# Patient Record
Sex: MALE | Race: WHITE | NOT HISPANIC OR LATINO | Employment: OTHER | URBAN - NONMETROPOLITAN AREA
[De-identification: names, ages, dates, MRNs, and addresses within clinical notes are randomized per-mention and may not be internally consistent; named-entity substitution may affect disease eponyms.]

---

## 2024-05-28 ENCOUNTER — HOSPITAL ENCOUNTER (EMERGENCY)
Facility: HOSPITAL | Age: 39
Discharge: HOME | End: 2024-05-28
Payer: MEDICAID

## 2024-05-28 ENCOUNTER — APPOINTMENT (OUTPATIENT)
Dept: RADIOLOGY | Facility: HOSPITAL | Age: 39
End: 2024-05-28
Payer: MEDICAID

## 2024-05-28 VITALS
HEART RATE: 95 BPM | SYSTOLIC BLOOD PRESSURE: 102 MMHG | HEIGHT: 76 IN | RESPIRATION RATE: 18 BRPM | BODY MASS INDEX: 26.79 KG/M2 | TEMPERATURE: 98.2 F | WEIGHT: 220 LBS | DIASTOLIC BLOOD PRESSURE: 69 MMHG | OXYGEN SATURATION: 97 %

## 2024-05-28 DIAGNOSIS — M79.605 LEG PAIN, ANTERIOR, LEFT: Primary | ICD-10-CM

## 2024-05-28 PROCEDURE — 73590 X-RAY EXAM OF LOWER LEG: CPT | Mod: LEFT SIDE | Performed by: RADIOLOGY

## 2024-05-28 PROCEDURE — 73610 X-RAY EXAM OF ANKLE: CPT | Mod: LT

## 2024-05-28 PROCEDURE — 73590 X-RAY EXAM OF LOWER LEG: CPT | Mod: LT

## 2024-05-28 PROCEDURE — 2500000004 HC RX 250 GENERAL PHARMACY W/ HCPCS (ALT 636 FOR OP/ED): Mod: SE

## 2024-05-28 PROCEDURE — 93971 EXTREMITY STUDY: CPT | Performed by: RADIOLOGY

## 2024-05-28 PROCEDURE — 99284 EMERGENCY DEPT VISIT MOD MDM: CPT | Mod: 25

## 2024-05-28 PROCEDURE — 93971 EXTREMITY STUDY: CPT

## 2024-05-28 PROCEDURE — 96372 THER/PROPH/DIAG INJ SC/IM: CPT

## 2024-05-28 PROCEDURE — 73610 X-RAY EXAM OF ANKLE: CPT | Mod: LEFT SIDE | Performed by: RADIOLOGY

## 2024-05-28 RX ORDER — MORPHINE SULFATE 4 MG/ML
4 INJECTION, SOLUTION INTRAMUSCULAR; INTRAVENOUS ONCE
Status: COMPLETED | OUTPATIENT
Start: 2024-05-28 | End: 2024-05-28

## 2024-05-28 RX ORDER — OXYCODONE AND ACETAMINOPHEN 5; 325 MG/1; MG/1
1 TABLET ORAL EVERY 6 HOURS PRN
Qty: 5 TABLET | Refills: 0 | Status: SHIPPED | OUTPATIENT
Start: 2024-05-28 | End: 2024-05-31

## 2024-05-28 RX ORDER — KETOROLAC TROMETHAMINE 30 MG/ML
30 INJECTION, SOLUTION INTRAMUSCULAR; INTRAVENOUS ONCE
Status: COMPLETED | OUTPATIENT
Start: 2024-05-28 | End: 2024-05-28

## 2024-05-28 RX ADMIN — KETOROLAC TROMETHAMINE 30 MG: 30 INJECTION, SOLUTION INTRAMUSCULAR at 13:53

## 2024-05-28 RX ADMIN — MORPHINE SULFATE 4 MG: 4 INJECTION, SOLUTION INTRAMUSCULAR; INTRAVENOUS at 14:24

## 2024-05-28 ASSESSMENT — COLUMBIA-SUICIDE SEVERITY RATING SCALE - C-SSRS
1. IN THE PAST MONTH, HAVE YOU WISHED YOU WERE DEAD OR WISHED YOU COULD GO TO SLEEP AND NOT WAKE UP?: NO
2. HAVE YOU ACTUALLY HAD ANY THOUGHTS OF KILLING YOURSELF?: NO
6. HAVE YOU EVER DONE ANYTHING, STARTED TO DO ANYTHING, OR PREPARED TO DO ANYTHING TO END YOUR LIFE?: NO

## 2024-05-28 ASSESSMENT — PAIN DESCRIPTION - ORIENTATION: ORIENTATION: LEFT

## 2024-05-28 ASSESSMENT — PAIN - FUNCTIONAL ASSESSMENT: PAIN_FUNCTIONAL_ASSESSMENT: 0-10

## 2024-05-28 ASSESSMENT — PAIN DESCRIPTION - LOCATION: LOCATION: LEG

## 2024-05-28 ASSESSMENT — PAIN SCALES - GENERAL
PAINLEVEL_OUTOF10: 3
PAINLEVEL_OUTOF10: 8
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
## 2024-06-05 ENCOUNTER — OFFICE VISIT (OUTPATIENT)
Dept: ORTHOPEDIC SURGERY | Facility: CLINIC | Age: 39
End: 2024-06-05
Payer: MEDICAID

## 2024-06-05 DIAGNOSIS — M25.562 LEFT KNEE PAIN, UNSPECIFIED CHRONICITY: ICD-10-CM

## 2024-06-05 DIAGNOSIS — S82.892S CLOSED LEFT ANKLE FRACTURE, SEQUELA: ICD-10-CM

## 2024-06-05 DIAGNOSIS — Z96.7 FIXATION HARDWARE IN LEG: ICD-10-CM

## 2024-06-05 PROCEDURE — 99214 OFFICE O/P EST MOD 30 MIN: CPT | Performed by: ORTHOPAEDIC SURGERY

## 2024-06-05 PROCEDURE — 99204 OFFICE O/P NEW MOD 45 MIN: CPT | Performed by: ORTHOPAEDIC SURGERY

## 2024-06-05 ASSESSMENT — PAIN - FUNCTIONAL ASSESSMENT: PAIN_FUNCTIONAL_ASSESSMENT: 0-10

## 2024-06-05 ASSESSMENT — PAIN SCALES - GENERAL: PAINLEVEL_OUTOF10: 5 - MODERATE PAIN
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
## 2024-06-14 ENCOUNTER — HOSPITAL ENCOUNTER (OUTPATIENT)
Dept: RADIOLOGY | Facility: HOSPITAL | Age: 39
Discharge: HOME | End: 2024-06-14
Payer: MEDICAID

## 2024-06-14 ENCOUNTER — OFFICE VISIT (OUTPATIENT)
Dept: ORTHOPEDIC SURGERY | Facility: HOSPITAL | Age: 39
End: 2024-06-14
Payer: MEDICAID

## 2024-06-14 VITALS — WEIGHT: 220 LBS | BODY MASS INDEX: 26.79 KG/M2 | HEIGHT: 76 IN

## 2024-06-14 DIAGNOSIS — S82.892S CLOSED LEFT ANKLE FRACTURE, SEQUELA: ICD-10-CM

## 2024-06-14 DIAGNOSIS — S82.892S CLOSED LEFT ANKLE FRACTURE, SEQUELA: Primary | ICD-10-CM

## 2024-06-14 DIAGNOSIS — M86.662: ICD-10-CM

## 2024-06-14 DIAGNOSIS — Z96.7 FIXATION HARDWARE IN LEG: ICD-10-CM

## 2024-06-14 PROCEDURE — 73590 X-RAY EXAM OF LOWER LEG: CPT | Mod: LT

## 2024-06-14 PROCEDURE — 73610 X-RAY EXAM OF ANKLE: CPT | Mod: LT

## 2024-06-14 PROCEDURE — 99215 OFFICE O/P EST HI 40 MIN: CPT | Performed by: ORTHOPAEDIC SURGERY

## 2024-06-14 RX ORDER — CEFAZOLIN SODIUM 2 G/100ML
2 INJECTION, SOLUTION INTRAVENOUS ONCE
OUTPATIENT
Start: 2024-06-14 | End: 2024-06-14

## 2024-06-14 RX ORDER — SODIUM CHLORIDE 9 MG/ML
100 INJECTION, SOLUTION INTRAVENOUS CONTINUOUS
OUTPATIENT
Start: 2024-06-14

## 2024-06-14 ASSESSMENT — PAIN SCALES - GENERAL: PAINLEVEL_OUTOF10: 4

## 2024-06-14 ASSESSMENT — PAIN - FUNCTIONAL ASSESSMENT: PAIN_FUNCTIONAL_ASSESSMENT: 0-10

## 2024-06-14 ASSESSMENT — PAIN DESCRIPTION - DESCRIPTORS: DESCRIPTORS: ACHING;THROBBING;SHARP
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
## 2024-06-18 ENCOUNTER — APPOINTMENT (OUTPATIENT)
Dept: RADIOLOGY | Facility: HOSPITAL | Age: 39
End: 2024-06-18
Payer: MEDICAID

## 2024-06-19 ENCOUNTER — APPOINTMENT (OUTPATIENT)
Dept: RADIOLOGY | Facility: HOSPITAL | Age: 39
End: 2024-06-19
Payer: MEDICAID

## 2024-06-20 ENCOUNTER — LAB (OUTPATIENT)
Dept: LAB | Facility: LAB | Age: 39
End: 2024-06-20
Payer: MEDICAID

## 2024-06-20 ENCOUNTER — HOSPITAL ENCOUNTER (OUTPATIENT)
Dept: RADIOLOGY | Facility: HOSPITAL | Age: 39
Discharge: HOME | End: 2024-06-20
Payer: MEDICAID

## 2024-06-20 DIAGNOSIS — Z96.7 FIXATION HARDWARE IN LEG: ICD-10-CM

## 2024-06-20 DIAGNOSIS — M86.662: ICD-10-CM

## 2024-06-20 DIAGNOSIS — S82.892S CLOSED LEFT ANKLE FRACTURE, SEQUELA: ICD-10-CM

## 2024-06-20 LAB
25(OH)D3 SERPL-MCNC: 23 NG/ML (ref 30–100)
ALBUMIN SERPL BCP-MCNC: 4.3 G/DL (ref 3.4–5)
ALP SERPL-CCNC: 62 U/L (ref 33–120)
ALT SERPL W P-5'-P-CCNC: 17 U/L (ref 10–52)
ANION GAP SERPL CALC-SCNC: 12 MMOL/L (ref 10–20)
AST SERPL W P-5'-P-CCNC: 12 U/L (ref 9–39)
BASOPHILS # BLD AUTO: 0.07 X10*3/UL (ref 0–0.1)
BASOPHILS NFR BLD AUTO: 0.6 %
BILIRUB SERPL-MCNC: 0.3 MG/DL (ref 0–1.2)
BUN SERPL-MCNC: 26 MG/DL (ref 6–23)
CALCIUM SERPL-MCNC: 9.6 MG/DL (ref 8.6–10.3)
CHLORIDE SERPL-SCNC: 104 MMOL/L (ref 98–107)
CO2 SERPL-SCNC: 25 MMOL/L (ref 21–32)
CREAT SERPL-MCNC: 1.15 MG/DL (ref 0.5–1.3)
CRP SERPL-MCNC: 0.7 MG/DL
EGFRCR SERPLBLD CKD-EPI 2021: 84 ML/MIN/1.73M*2
EOSINOPHIL # BLD AUTO: 0.24 X10*3/UL (ref 0–0.7)
EOSINOPHIL NFR BLD AUTO: 2.2 %
ERYTHROCYTE [DISTWIDTH] IN BLOOD BY AUTOMATED COUNT: 15.9 % (ref 11.5–14.5)
ERYTHROCYTE [SEDIMENTATION RATE] IN BLOOD BY WESTERGREN METHOD: 20 MM/H (ref 0–15)
GLUCOSE SERPL-MCNC: 97 MG/DL (ref 74–99)
HCT VFR BLD AUTO: 48.6 % (ref 41–52)
HGB BLD-MCNC: 15.9 G/DL (ref 13.5–17.5)
IMM GRANULOCYTES # BLD AUTO: 0.11 X10*3/UL (ref 0–0.7)
IMM GRANULOCYTES NFR BLD AUTO: 1 % (ref 0–0.9)
LYMPHOCYTES # BLD AUTO: 2.34 X10*3/UL (ref 1.2–4.8)
LYMPHOCYTES NFR BLD AUTO: 21.1 %
MCH RBC QN AUTO: 26.6 PG (ref 26–34)
MCHC RBC AUTO-ENTMCNC: 32.7 G/DL (ref 32–36)
MCV RBC AUTO: 81 FL (ref 80–100)
MONOCYTES # BLD AUTO: 1 X10*3/UL (ref 0.1–1)
MONOCYTES NFR BLD AUTO: 9 %
NEUTROPHILS # BLD AUTO: 7.31 X10*3/UL (ref 1.2–7.7)
NEUTROPHILS NFR BLD AUTO: 66.1 %
NRBC BLD-RTO: 0 /100 WBCS (ref 0–0)
PLATELET # BLD AUTO: 311 X10*3/UL (ref 150–450)
POTASSIUM SERPL-SCNC: 3.9 MMOL/L (ref 3.5–5.3)
PROT SERPL-MCNC: 7.6 G/DL (ref 6.4–8.2)
PTH-INTACT SERPL-MCNC: 37.8 PG/ML (ref 18.5–88)
RBC # BLD AUTO: 5.97 X10*6/UL (ref 4.5–5.9)
SODIUM SERPL-SCNC: 137 MMOL/L (ref 136–145)
TSH SERPL-ACNC: 2.49 MIU/L (ref 0.44–3.98)
WBC # BLD AUTO: 11.1 X10*3/UL (ref 4.4–11.3)

## 2024-06-20 PROCEDURE — 86140 C-REACTIVE PROTEIN: CPT

## 2024-06-20 PROCEDURE — 85652 RBC SED RATE AUTOMATED: CPT

## 2024-06-20 PROCEDURE — 82530 CORTISOL FREE: CPT

## 2024-06-20 PROCEDURE — 84443 ASSAY THYROID STIM HORMONE: CPT

## 2024-06-20 PROCEDURE — 82306 VITAMIN D 25 HYDROXY: CPT

## 2024-06-20 PROCEDURE — 73700 CT LOWER EXTREMITY W/O DYE: CPT | Mod: LT

## 2024-06-20 PROCEDURE — 80053 COMPREHEN METABOLIC PANEL: CPT

## 2024-06-20 PROCEDURE — 85025 COMPLETE CBC W/AUTO DIFF WBC: CPT

## 2024-06-20 PROCEDURE — 83970 ASSAY OF PARATHORMONE: CPT

## 2024-06-24 ENCOUNTER — ANESTHESIA EVENT (OUTPATIENT)
Dept: OPERATING ROOM | Facility: HOSPITAL | Age: 39
End: 2024-06-24
Payer: MEDICAID

## 2024-06-25 ENCOUNTER — HOSPITAL ENCOUNTER (INPATIENT)
Facility: HOSPITAL | Age: 39
LOS: 2 days | Discharge: HOME | End: 2024-06-29
Attending: ORTHOPAEDIC SURGERY | Admitting: ORTHOPAEDIC SURGERY
Payer: MEDICAID

## 2024-06-25 ENCOUNTER — APPOINTMENT (OUTPATIENT)
Dept: RADIOLOGY | Facility: HOSPITAL | Age: 39
End: 2024-06-25
Payer: MEDICAID

## 2024-06-25 ENCOUNTER — ANESTHESIA (OUTPATIENT)
Dept: OPERATING ROOM | Facility: HOSPITAL | Age: 39
End: 2024-06-25
Payer: MEDICAID

## 2024-06-25 DIAGNOSIS — S82.892S CLOSED LEFT ANKLE FRACTURE, SEQUELA: Primary | ICD-10-CM

## 2024-06-25 DIAGNOSIS — T84.623S: ICD-10-CM

## 2024-06-25 DIAGNOSIS — M86.662: ICD-10-CM

## 2024-06-25 LAB
ABO GROUP (TYPE) IN BLOOD: NORMAL
ANTIBODY SCREEN: NORMAL
CORTIS F SERPL-MCNC: 0.72 UG/DL
RH FACTOR (ANTIGEN D): NORMAL

## 2024-06-25 PROCEDURE — A4217 STERILE WATER/SALINE, 500 ML: HCPCS | Performed by: ORTHOPAEDIC SURGERY

## 2024-06-25 PROCEDURE — 99222 1ST HOSP IP/OBS MODERATE 55: CPT | Performed by: INTERNAL MEDICINE

## 2024-06-25 PROCEDURE — 3600000009 HC OR TIME - EACH INCREMENTAL 1 MINUTE - PROCEDURE LEVEL FOUR: Performed by: ORTHOPAEDIC SURGERY

## 2024-06-25 PROCEDURE — 3600000004 HC OR TIME - INITIAL BASE CHARGE - PROCEDURE LEVEL FOUR: Performed by: ORTHOPAEDIC SURGERY

## 2024-06-25 PROCEDURE — 99223 1ST HOSP IP/OBS HIGH 75: CPT | Performed by: STUDENT IN AN ORGANIZED HEALTH CARE EDUCATION/TRAINING PROGRAM

## 2024-06-25 PROCEDURE — 7100000001 HC RECOVERY ROOM TIME - INITIAL BASE CHARGE: Performed by: ORTHOPAEDIC SURGERY

## 2024-06-25 PROCEDURE — 36415 COLL VENOUS BLD VENIPUNCTURE: CPT | Performed by: ORTHOPAEDIC SURGERY

## 2024-06-25 PROCEDURE — 3700000001 HC GENERAL ANESTHESIA TIME - INITIAL BASE CHARGE: Performed by: ORTHOPAEDIC SURGERY

## 2024-06-25 PROCEDURE — 0QPH04Z REMOVAL OF INTERNAL FIXATION DEVICE FROM LEFT TIBIA, OPEN APPROACH: ICD-10-PCS | Performed by: OBSTETRICS & GYNECOLOGY

## 2024-06-25 PROCEDURE — 2500000004 HC RX 250 GENERAL PHARMACY W/ HCPCS (ALT 636 FOR OP/ED): Mod: SE | Performed by: STUDENT IN AN ORGANIZED HEALTH CARE EDUCATION/TRAINING PROGRAM

## 2024-06-25 PROCEDURE — 2500000001 HC RX 250 WO HCPCS SELF ADMINISTERED DRUGS (ALT 637 FOR MEDICARE OP): Mod: SE | Performed by: STUDENT IN AN ORGANIZED HEALTH CARE EDUCATION/TRAINING PROGRAM

## 2024-06-25 PROCEDURE — 2500000004 HC RX 250 GENERAL PHARMACY W/ HCPCS (ALT 636 FOR OP/ED): Mod: SE

## 2024-06-25 PROCEDURE — 3700000002 HC GENERAL ANESTHESIA TIME - EACH INCREMENTAL 1 MINUTE: Performed by: ORTHOPAEDIC SURGERY

## 2024-06-25 PROCEDURE — 27640 PARTIAL REMOVAL OF TIBIA: CPT | Performed by: ORTHOPAEDIC SURGERY

## 2024-06-25 PROCEDURE — 87070 CULTURE OTHR SPECIMN AEROBIC: CPT | Performed by: ORTHOPAEDIC SURGERY

## 2024-06-25 PROCEDURE — 0QBH0ZZ EXCISION OF LEFT TIBIA, OPEN APPROACH: ICD-10-PCS | Performed by: OBSTETRICS & GYNECOLOGY

## 2024-06-25 PROCEDURE — 2500000005 HC RX 250 GENERAL PHARMACY W/O HCPCS

## 2024-06-25 PROCEDURE — 7100000002 HC RECOVERY ROOM TIME - EACH INCREMENTAL 1 MINUTE: Performed by: ORTHOPAEDIC SURGERY

## 2024-06-25 PROCEDURE — C1763 CONN TISS, NON-HUMAN: HCPCS | Performed by: ORTHOPAEDIC SURGERY

## 2024-06-25 PROCEDURE — 2780000003 HC OR 278 NO HCPCS: Performed by: ORTHOPAEDIC SURGERY

## 2024-06-25 PROCEDURE — 87102 FUNGUS ISOLATION CULTURE: CPT | Performed by: ORTHOPAEDIC SURGERY

## 2024-06-25 PROCEDURE — 2720000007 HC OR 272 NO HCPCS: Performed by: ORTHOPAEDIC SURGERY

## 2024-06-25 PROCEDURE — 20680 REMOVAL OF IMPLANT DEEP: CPT | Performed by: PHYSICIAN ASSISTANT

## 2024-06-25 PROCEDURE — C1713 ANCHOR/SCREW BN/BN,TIS/BN: HCPCS | Performed by: ORTHOPAEDIC SURGERY

## 2024-06-25 PROCEDURE — 76942 ECHO GUIDE FOR BIOPSY: CPT | Performed by: STUDENT IN AN ORGANIZED HEALTH CARE EDUCATION/TRAINING PROGRAM

## 2024-06-25 PROCEDURE — 13121 CMPLX RPR S/A/L 2.6-7.5 CM: CPT | Performed by: ORTHOPAEDIC SURGERY

## 2024-06-25 PROCEDURE — 20702 MNL PREP&INSJ IMED RX DEV: CPT | Performed by: ORTHOPAEDIC SURGERY

## 2024-06-25 PROCEDURE — XW0V0P7 INTRODUCTION OF ANTIBIOTIC-ELUTING BONE VOID FILLER INTO BONES, OPEN APPROACH, NEW TECHNOLOGY GROUP 7: ICD-10-PCS | Performed by: OBSTETRICS & GYNECOLOGY

## 2024-06-25 PROCEDURE — 1100000001 HC PRIVATE ROOM DAILY

## 2024-06-25 PROCEDURE — 86901 BLOOD TYPING SEROLOGIC RH(D): CPT | Performed by: ORTHOPAEDIC SURGERY

## 2024-06-25 PROCEDURE — 20680 REMOVAL OF IMPLANT DEEP: CPT | Performed by: ORTHOPAEDIC SURGERY

## 2024-06-25 PROCEDURE — 7100000011 HC EXTENDED STAY RECOVERY HOURLY - NURSING UNIT

## 2024-06-25 PROCEDURE — 2500000004 HC RX 250 GENERAL PHARMACY W/ HCPCS (ALT 636 FOR OP/ED): Performed by: ORTHOPAEDIC SURGERY

## 2024-06-25 PROCEDURE — 2500000004 HC RX 250 GENERAL PHARMACY W/ HCPCS (ALT 636 FOR OP/ED)

## 2024-06-25 PROCEDURE — 2500000001 HC RX 250 WO HCPCS SELF ADMINISTERED DRUGS (ALT 637 FOR MEDICARE OP): Mod: SE

## 2024-06-25 DEVICE — STIMULAN KIT, RAPID CURE, 10CC: Type: IMPLANTABLE DEVICE | Site: LEG | Status: FUNCTIONAL

## 2024-06-25 DEVICE — IMPLANTABLE DEVICE: Type: IMPLANTABLE DEVICE | Site: LEG | Status: NON-FUNCTIONAL

## 2024-06-25 RX ORDER — TOBRAMYCIN 1.2 G/30ML
INJECTION, POWDER, LYOPHILIZED, FOR SOLUTION INTRAVENOUS AS NEEDED
Status: DISCONTINUED | OUTPATIENT
Start: 2024-06-25 | End: 2024-06-25 | Stop reason: HOSPADM

## 2024-06-25 RX ORDER — HYDROMORPHONE HYDROCHLORIDE 1 MG/ML
0.5 INJECTION, SOLUTION INTRAMUSCULAR; INTRAVENOUS; SUBCUTANEOUS EVERY 5 MIN PRN
Status: DISCONTINUED | OUTPATIENT
Start: 2024-06-25 | End: 2024-06-25 | Stop reason: HOSPADM

## 2024-06-25 RX ORDER — VANCOMYCIN HYDROCHLORIDE 1 G/200ML
1000 INJECTION, SOLUTION INTRAVENOUS EVERY 12 HOURS
Status: DISCONTINUED | OUTPATIENT
Start: 2024-06-25 | End: 2024-06-26

## 2024-06-25 RX ORDER — TRANEXAMIC ACID 100 MG/ML
INJECTION, SOLUTION INTRAVENOUS AS NEEDED
Status: DISCONTINUED | OUTPATIENT
Start: 2024-06-25 | End: 2024-06-25

## 2024-06-25 RX ORDER — GABAPENTIN 100 MG/1
100 CAPSULE ORAL EVERY 8 HOURS SCHEDULED
Status: DISCONTINUED | OUTPATIENT
Start: 2024-06-25 | End: 2024-06-29 | Stop reason: HOSPADM

## 2024-06-25 RX ORDER — KETOROLAC TROMETHAMINE 30 MG/ML
15 INJECTION, SOLUTION INTRAMUSCULAR; INTRAVENOUS ONCE
Status: COMPLETED | OUTPATIENT
Start: 2024-06-25 | End: 2024-06-25

## 2024-06-25 RX ORDER — ACETAMINOPHEN 325 MG/1
650 TABLET ORAL EVERY 6 HOURS SCHEDULED
Status: DISCONTINUED | OUTPATIENT
Start: 2024-06-25 | End: 2024-06-29 | Stop reason: HOSPADM

## 2024-06-25 RX ORDER — HYDROMORPHONE HYDROCHLORIDE 1 MG/ML
INJECTION, SOLUTION INTRAMUSCULAR; INTRAVENOUS; SUBCUTANEOUS AS NEEDED
Status: DISCONTINUED | OUTPATIENT
Start: 2024-06-25 | End: 2024-06-25

## 2024-06-25 RX ORDER — HYDROMORPHONE HYDROCHLORIDE 1 MG/ML
0.2 INJECTION, SOLUTION INTRAMUSCULAR; INTRAVENOUS; SUBCUTANEOUS EVERY 5 MIN PRN
Status: DISCONTINUED | OUTPATIENT
Start: 2024-06-25 | End: 2024-06-25 | Stop reason: HOSPADM

## 2024-06-25 RX ORDER — MIDAZOLAM HYDROCHLORIDE 1 MG/ML
INJECTION INTRAMUSCULAR; INTRAVENOUS AS NEEDED
Status: DISCONTINUED | OUTPATIENT
Start: 2024-06-25 | End: 2024-06-25

## 2024-06-25 RX ORDER — METHOCARBAMOL 100 MG/ML
1000 INJECTION, SOLUTION INTRAMUSCULAR; INTRAVENOUS ONCE
Status: COMPLETED | OUTPATIENT
Start: 2024-06-25 | End: 2024-06-25

## 2024-06-25 RX ORDER — LIDOCAINE HYDROCHLORIDE 10 MG/ML
0.1 INJECTION INFILTRATION; PERINEURAL ONCE
Status: DISCONTINUED | OUTPATIENT
Start: 2024-06-25 | End: 2024-06-25 | Stop reason: HOSPADM

## 2024-06-25 RX ORDER — PROPOFOL 10 MG/ML
INJECTION, EMULSION INTRAVENOUS AS NEEDED
Status: DISCONTINUED | OUTPATIENT
Start: 2024-06-25 | End: 2024-06-25

## 2024-06-25 RX ORDER — OXYCODONE HYDROCHLORIDE 5 MG/1
5 TABLET ORAL EVERY 4 HOURS PRN
Status: DISCONTINUED | OUTPATIENT
Start: 2024-06-25 | End: 2024-06-25

## 2024-06-25 RX ORDER — ONDANSETRON HYDROCHLORIDE 2 MG/ML
4 INJECTION, SOLUTION INTRAVENOUS EVERY 8 HOURS PRN
Status: DISCONTINUED | OUTPATIENT
Start: 2024-06-25 | End: 2024-06-29 | Stop reason: HOSPADM

## 2024-06-25 RX ORDER — MORPHINE SULFATE 2 MG/ML
2 INJECTION, SOLUTION INTRAMUSCULAR; INTRAVENOUS EVERY 4 HOURS PRN
Status: DISCONTINUED | OUTPATIENT
Start: 2024-06-25 | End: 2024-06-29 | Stop reason: HOSPADM

## 2024-06-25 RX ORDER — LIDOCAINE HCL/PF 100 MG/5ML
SYRINGE (ML) INTRAVENOUS AS NEEDED
Status: DISCONTINUED | OUTPATIENT
Start: 2024-06-25 | End: 2024-06-25

## 2024-06-25 RX ORDER — METHOCARBAMOL 500 MG/1
750 TABLET, FILM COATED ORAL EVERY 8 HOURS SCHEDULED
Status: DISCONTINUED | OUTPATIENT
Start: 2024-06-25 | End: 2024-06-29 | Stop reason: HOSPADM

## 2024-06-25 RX ORDER — OXYCODONE HYDROCHLORIDE 5 MG/1
5 TABLET ORAL EVERY 4 HOURS PRN
Status: DISCONTINUED | OUTPATIENT
Start: 2024-06-25 | End: 2024-06-26

## 2024-06-25 RX ORDER — OXYCODONE HYDROCHLORIDE 5 MG/1
10 TABLET ORAL EVERY 6 HOURS PRN
Status: DISCONTINUED | OUTPATIENT
Start: 2024-06-25 | End: 2024-06-26

## 2024-06-25 RX ORDER — DIPHENHYDRAMINE HCL 12.5MG/5ML
12.5 LIQUID (ML) ORAL EVERY 6 HOURS PRN
Status: DISCONTINUED | OUTPATIENT
Start: 2024-06-25 | End: 2024-06-29 | Stop reason: HOSPADM

## 2024-06-25 RX ORDER — SODIUM CHLORIDE 0.9 G/100ML
IRRIGANT IRRIGATION AS NEEDED
Status: DISCONTINUED | OUTPATIENT
Start: 2024-06-25 | End: 2024-06-25 | Stop reason: HOSPADM

## 2024-06-25 RX ORDER — OXYCODONE HYDROCHLORIDE 5 MG/1
10 TABLET ORAL EVERY 4 HOURS PRN
Status: DISCONTINUED | OUTPATIENT
Start: 2024-06-25 | End: 2024-06-25

## 2024-06-25 RX ORDER — FENTANYL CITRATE 50 UG/ML
INJECTION, SOLUTION INTRAMUSCULAR; INTRAVENOUS AS NEEDED
Status: DISCONTINUED | OUTPATIENT
Start: 2024-06-25 | End: 2024-06-25

## 2024-06-25 RX ORDER — MORPHINE SULFATE 15 MG/1
7.5 TABLET ORAL EVERY 4 HOURS PRN
Status: DISCONTINUED | OUTPATIENT
Start: 2024-06-25 | End: 2024-06-25

## 2024-06-25 RX ORDER — SODIUM CHLORIDE 9 MG/ML
100 INJECTION, SOLUTION INTRAVENOUS CONTINUOUS
Status: DISCONTINUED | OUTPATIENT
Start: 2024-06-25 | End: 2024-06-25

## 2024-06-25 RX ORDER — ONDANSETRON HYDROCHLORIDE 2 MG/ML
4 INJECTION, SOLUTION INTRAVENOUS ONCE AS NEEDED
Status: DISCONTINUED | OUTPATIENT
Start: 2024-06-25 | End: 2024-06-25 | Stop reason: HOSPADM

## 2024-06-25 RX ORDER — POLYETHYLENE GLYCOL 3350 17 G/17G
17 POWDER, FOR SOLUTION ORAL DAILY
Status: DISCONTINUED | OUTPATIENT
Start: 2024-06-25 | End: 2024-06-29 | Stop reason: HOSPADM

## 2024-06-25 RX ORDER — ONDANSETRON HYDROCHLORIDE 2 MG/ML
INJECTION, SOLUTION INTRAVENOUS AS NEEDED
Status: DISCONTINUED | OUTPATIENT
Start: 2024-06-25 | End: 2024-06-25

## 2024-06-25 RX ORDER — OXYCODONE HYDROCHLORIDE 5 MG/1
10 TABLET ORAL EVERY 4 HOURS PRN
Status: DISCONTINUED | OUTPATIENT
Start: 2024-06-25 | End: 2024-06-25 | Stop reason: HOSPADM

## 2024-06-25 RX ORDER — DEXMEDETOMIDINE IN 0.9 % NACL 20 MCG/5ML
SYRINGE (ML) INTRAVENOUS AS NEEDED
Status: DISCONTINUED | OUTPATIENT
Start: 2024-06-25 | End: 2024-06-25

## 2024-06-25 RX ORDER — VANCOMYCIN HYDROCHLORIDE 1 G/20ML
INJECTION, POWDER, LYOPHILIZED, FOR SOLUTION INTRAVENOUS DAILY PRN
Status: DISCONTINUED | OUTPATIENT
Start: 2024-06-25 | End: 2024-06-26

## 2024-06-25 RX ORDER — MORPHINE SULFATE 15 MG/1
15 TABLET ORAL EVERY 4 HOURS PRN
Status: DISCONTINUED | OUTPATIENT
Start: 2024-06-25 | End: 2024-06-25

## 2024-06-25 RX ORDER — AMOXICILLIN 250 MG
2 CAPSULE ORAL 2 TIMES DAILY
Status: DISCONTINUED | OUTPATIENT
Start: 2024-06-25 | End: 2024-06-29 | Stop reason: HOSPADM

## 2024-06-25 RX ORDER — ASPIRIN 81 MG/1
81 TABLET ORAL 2 TIMES DAILY
Status: DISCONTINUED | OUTPATIENT
Start: 2024-06-25 | End: 2024-06-29 | Stop reason: HOSPADM

## 2024-06-25 RX ORDER — CEFAZOLIN SODIUM 2 G/100ML
2 INJECTION, SOLUTION INTRAVENOUS ONCE
Status: DISCONTINUED | OUTPATIENT
Start: 2024-06-25 | End: 2024-06-25 | Stop reason: HOSPADM

## 2024-06-25 RX ORDER — HYDROMORPHONE HYDROCHLORIDE 1 MG/ML
0.5 INJECTION, SOLUTION INTRAMUSCULAR; INTRAVENOUS; SUBCUTANEOUS EVERY 2 HOUR PRN
Status: DISCONTINUED | OUTPATIENT
Start: 2024-06-25 | End: 2024-06-25

## 2024-06-25 RX ORDER — ROCURONIUM BROMIDE 10 MG/ML
INJECTION, SOLUTION INTRAVENOUS AS NEEDED
Status: DISCONTINUED | OUTPATIENT
Start: 2024-06-25 | End: 2024-06-25

## 2024-06-25 RX ORDER — CEFTRIAXONE 2 G/50ML
2 INJECTION, SOLUTION INTRAVENOUS EVERY 24 HOURS
Status: DISCONTINUED | OUTPATIENT
Start: 2024-06-25 | End: 2024-06-26

## 2024-06-25 RX ORDER — CEFAZOLIN 1 G/1
INJECTION, POWDER, FOR SOLUTION INTRAVENOUS AS NEEDED
Status: DISCONTINUED | OUTPATIENT
Start: 2024-06-25 | End: 2024-06-25

## 2024-06-25 RX ORDER — VANCOMYCIN HYDROCHLORIDE 1 G/20ML
INJECTION, POWDER, LYOPHILIZED, FOR SOLUTION INTRAVENOUS AS NEEDED
Status: DISCONTINUED | OUTPATIENT
Start: 2024-06-25 | End: 2024-06-25 | Stop reason: HOSPADM

## 2024-06-25 RX ORDER — SODIUM CHLORIDE, SODIUM LACTATE, POTASSIUM CHLORIDE, CALCIUM CHLORIDE 600; 310; 30; 20 MG/100ML; MG/100ML; MG/100ML; MG/100ML
INJECTION, SOLUTION INTRAVENOUS CONTINUOUS PRN
Status: DISCONTINUED | OUTPATIENT
Start: 2024-06-25 | End: 2024-06-25

## 2024-06-25 RX ORDER — PHENYLEPHRINE HCL IN 0.9% NACL 0.4MG/10ML
SYRINGE (ML) INTRAVENOUS AS NEEDED
Status: DISCONTINUED | OUTPATIENT
Start: 2024-06-25 | End: 2024-06-25

## 2024-06-25 RX ORDER — NALOXONE HYDROCHLORIDE 0.4 MG/ML
0.2 INJECTION, SOLUTION INTRAMUSCULAR; INTRAVENOUS; SUBCUTANEOUS EVERY 5 MIN PRN
Status: DISCONTINUED | OUTPATIENT
Start: 2024-06-25 | End: 2024-06-29 | Stop reason: HOSPADM

## 2024-06-25 RX ORDER — SODIUM CHLORIDE, SODIUM LACTATE, POTASSIUM CHLORIDE, CALCIUM CHLORIDE 600; 310; 30; 20 MG/100ML; MG/100ML; MG/100ML; MG/100ML
100 INJECTION, SOLUTION INTRAVENOUS CONTINUOUS
Status: DISCONTINUED | OUTPATIENT
Start: 2024-06-25 | End: 2024-06-25 | Stop reason: HOSPADM

## 2024-06-25 RX ORDER — ONDANSETRON 4 MG/1
4 TABLET, FILM COATED ORAL EVERY 8 HOURS PRN
Status: DISCONTINUED | OUTPATIENT
Start: 2024-06-25 | End: 2024-06-29 | Stop reason: HOSPADM

## 2024-06-25 SDOH — HEALTH STABILITY: MENTAL HEALTH: CURRENT SMOKER: 0

## 2024-06-25 ASSESSMENT — PAIN SCALES - GENERAL
PAINLEVEL_OUTOF10: 8
PAINLEVEL_OUTOF10: 9
PAINLEVEL_OUTOF10: 8
PAINLEVEL_OUTOF10: 6
PAINLEVEL_OUTOF10: 8
PAINLEVEL_OUTOF10: 9
PAINLEVEL_OUTOF10: 7
PAINLEVEL_OUTOF10: 8
PAINLEVEL_OUTOF10: 7

## 2024-06-25 ASSESSMENT — PAIN - FUNCTIONAL ASSESSMENT
PAIN_FUNCTIONAL_ASSESSMENT: 0-10
PAIN_FUNCTIONAL_ASSESSMENT: 0-10

## 2024-06-25 ASSESSMENT — PAIN DESCRIPTION - DESCRIPTORS: DESCRIPTORS: ACHING

## 2024-06-25 ASSESSMENT — COLUMBIA-SUICIDE SEVERITY RATING SCALE - C-SSRS
6. HAVE YOU EVER DONE ANYTHING, STARTED TO DO ANYTHING, OR PREPARED TO DO ANYTHING TO END YOUR LIFE?: NO
1. IN THE PAST MONTH, HAVE YOU WISHED YOU WERE DEAD OR WISHED YOU COULD GO TO SLEEP AND NOT WAKE UP?: NO
2. HAVE YOU ACTUALLY HAD ANY THOUGHTS OF KILLING YOURSELF?: NO

## 2024-06-25 NOTE — ANESTHESIA PROCEDURE NOTES
Airway  Date/Time: 6/25/2024 7:54 AM  Urgency: elective    Airway not difficult    Staffing  Performed: SRNA   Authorized by: Treva Friend MD    Performed by: Khadijah Porter  Patient location during procedure: OR    Indications and Patient Condition  Indications for airway management: anesthesia and airway protection  Spontaneous Ventilation: absent  Sedation level: deep  Preoxygenated: yes  Patient position: reverse Trendelenburg  Mask difficulty assessment: 2 - vent by mask + OA or adjuvant +/- NMBA  Planned trial extubation    Final Airway Details  Final airway type: endotracheal airway      Successful airway: ETT  Cuffed: yes   Successful intubation technique: direct laryngoscopy  Facilitating devices/methods: intubating stylet and cricoid pressure  Endotracheal tube insertion site: oral  Blade: Yola  Blade size: #4  ETT size (mm): 7.5  Cormack-Lehane Classification: grade I - full view of glottis  Placement verified by: chest auscultation and capnometry   Measured from: gums  ETT to gums (cm): 24  Number of attempts at approach: 1

## 2024-06-25 NOTE — PROCEDURES
Peripheral Block    Patient location during procedure: pre-op  Start time: 6/25/2024 11:50 AM  End time: 6/25/2024 11:55 AM  Reason for block: at surgeon's request and post-op pain management  Staffing  Performed: resident   Authorized by: Daniel Scanlon DO    Performed by: Daniel Scanlon DO  Preanesthetic Checklist  Completed: patient identified, IV checked, site marked, risks and benefits discussed, surgical consent, monitors and equipment checked, pre-op evaluation and timeout performed   Timeout performed at: 6/25/2024 11:53 AM  Peripheral Block  Patient position: laying flat  Prep: ChloraPrep  Patient monitoring: heart rate and continuous pulse ox  Block type: femoral  Laterality: left  Injection technique: single-shot  Guidance: ultrasound guided  Local infiltration: ropivacaine  Infiltration strength: 0.5 %  Dose: 25 mL  Needle  Needle type: Pencan.   Needle length: 10 cm  Needle localization: ultrasound guidance     image stored in chart  Assessment  Injection assessment: negative aspiration for heme, no paresthesia on injection, incremental injection and local visualized surrounding nerve on ultrasound  Additional Notes  Femoral nerve block: Informed consent obtained.  Risks, benefits, and alternatives discussed.  ASA monitors placed and timeout performed.  Patient positioned, prepped with chlorhexidine, and draped with sterile towels.  Ultrasound guidance used to visualize femoral nerve and vessels as well as surrounding structures.  Needle was visualized throughout the procedure.  Aspiration negative.  25 cc of 0.5% ropivacaine, 4mg Decadron, and 50mcg epi injected. Patient tolerated procedure well.    Timeout by RN

## 2024-06-25 NOTE — CONSULTS
Vancomycin Dosing by Pharmacy- INITIAL    Mark Dao is a 39 y.o. year old male who Pharmacy has been consulted for vancomycin dosing for other surgical prophylaxis . Based on the patient's indication and renal status this patient will be dosed based on a goal AUC of 400-600.     Renal function is currently stable.    Visit Vitals  /68   Pulse 89   Temp 36.2 °C (97.2 °F) (Tympanic)   Resp 20        Lab Results   Component Value Date    CREATININE 1.15 2024    CREATININE 1.09 2023    CREATININE 1.00 10/31/2018        Patient weight is as follows:   Vitals:    24 0632   Weight: 100 kg (220 lb 7.4 oz)       Cultures:  No results found for the encounter in last 14 days.        No intake/output data recorded.  I/O during current shift:  I/O this shift:  In: 900 [I.V.:900]  Out: 250 [Blood:250]    Temp (24hrs), Av.3 °C (97.4 °F), Min:36.2 °C (97.2 °F), Max:36.5 °C (97.7 °F)         Assessment/Plan     Patient has already been given a loading dose of 1000 mg.  Will initiate vancomycin maintenance,  1000 mg every 12 hours.    This dosing regimen is predicted by [employer] to result in the following pharmacokinetic parameters:  Regimen: 1000 mg IV every 12 hours.  Start time: 13:46 on 2024  Exposure target: AUC24 (range)400-600 mg/L.hr   AUC24,ss: 422 mg/L.hr  Probability of AUC24 > 400: 56 %  Ctrough,ss: 13.4 mg/L  Probability of Ctrough,ss > 20: 17 %  Probability of nephrotoxicity (Lodise CLARE ): 9 %    Follow-up level will be ordered on  at 0500 unless clinically indicated sooner.  Will continue to monitor renal function daily while on vancomycin and order serum creatinine at least every 48 hours if not already ordered.  Follow for continued vancomycin needs, clinical response, and signs/symptoms of toxicity.       Tyler Sam, PharmD
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
## 2024-06-25 NOTE — BRIEF OP NOTE
Date: 2024  OR Location: [facility] OR    Name: Mark Dao, : 1985, Age: 39 y.o., MRN: [id number], Sex: male    Diagnosis  Pre-op Diagnosis     * Closed left ankle fracture, sequela [S82.892S]     * Other chronic osteomyelitis, left tibia and fibula (Multi) [M86.662] Post-op Diagnosis     * Closed left ankle fracture, sequela [S82.892S]     * Other chronic osteomyelitis, left tibia and fibula (Multi) [M86.662]     Procedures  Excision Bone Tibia/Fibula  46960 - IA PARTIAL EXCISION BONE TIBIA    IA REMOVAL IMPLANT DEEP []  IA MANUAL PREP&INSJ I-ARTIC DRUG DELIVERY DEVICE []  Surgeons      * Osei Reynoso - Primary    Resident/Fellow/Other Assistant:  Surgeons and Role:     * Sherley Sidhu MD - Resident - Assisting     * Mark Tobin DPM - Resident - Assisting     * Debbi Ward PA-C - MARANDA First Assist    Procedure Summary  Anesthesia: General  ASA: III  Anesthesia Staff: Anesthesiologist: Treva Friend MD  CRNA: ALFREDA Billingsley-CRNA  C-AA: GREGORIA Bean  SRNA: Khadijah Porter  Estimated Blood Loss: 50mL  Intra-op Medications:   Administrations occurring from 0715 to 0835 on 24:   Medication Name Total Dose   sodium chloride 0.9 % irrigation solution 4,000 mL   vancomycin (Vancocin) vial for injection 2 g   tobramycin (Nebcin) injection 2,400 mg              Anesthesia Record               Intraprocedure I/O Totals          Intake    Tranexamic Acid 0.00 mL    The total shown is the total volume documented since Anesthesia Start was filed.    lactated Ringer's 900.00 mL    Total Intake 900 mL       Output    Est. Blood Loss 250 mL    Total Output 250 mL       Net    Net Volume 650 mL          Specimen:   ID Type Source Tests Collected by Time   A : LEFT TIBIA NAIL #1 Swab NAIL BIOPSY FUNGAL CULTURE/SMEAR, TISSUE/WOUND CULTURE/SMEAR Osei Reynoso MD 2024 0826   B : LEFT TIBIA NAIL #2 Swab NAIL BIOPSY FUNGAL CULTURE/SMEAR, TISSUE/WOUND CULTURE/SMEAR  Osei Reynoso MD 6/25/2024 0826        Staff:   Circulator: Mendor  Scrub Person: Sara          Findings: left tibia retained orthopedic hardware with surigcal site infection, healed tibial shaft fracture    Complications:  None; patient tolerated the procedure well.     Disposition: PACU - hemodynamically stable.  Condition: stable  Specimens Collected:   ID Type Source Tests Collected by Time   A : LEFT TIBIA NAIL #1 Swab NAIL BIOPSY FUNGAL CULTURE/SMEAR, TISSUE/WOUND CULTURE/SMEAR Osei Reynoso MD 6/25/2024 0826   B : LEFT TIBIA NAIL #2 Swab NAIL BIOPSY FUNGAL CULTURE/SMEAR, TISSUE/WOUND CULTURE/SMEAR Osei Reynoso MD 6/25/2024 0826     Attending Attestation: Dr. Reynoso was present and scrubbed for all critical portions of the procedure.

## 2024-06-25 NOTE — CARE PLAN
The patient's goals for the shift include      The clinical goals for the shift include pain mgt

## 2024-06-25 NOTE — CONSULTS
Mark Dao is a 39 y.o. year old male patient who presents for LEFT partial tib/fib bone excision with Dr. Reynoso on 6/25/24. Acute Pain consulted for block for postoperative pain control.     Anticipated Postop Pain Issues -   Palliative: typically relieved with IV analgesics and regional local anesthetics  Provocative: typically with movement  Quality: typically burning and aching  Radiation: typically none  Severity: typically severe 8-10/10  Timing: typically constant    History reviewed. No pertinent past medical history.     Past Surgical History:   Procedure Laterality Date    CT AORTA AND BILATERAL ILIOFEMORAL RUNOFF ANGIOGRAM W AND/OR WO IV CONTRAST  7/19/2023    CT AORTA AND BILATERAL ILIOFEMORAL RUNOFF ANGIOGRAM W AND/OR WO IV CONTRAST 7/19/2023 GEN CT        No family history on file.     Social History     Socioeconomic History    Marital status: Single     Spouse name: Not on file    Number of children: Not on file    Years of education: Not on file    Highest education level: Not on file   Occupational History    Not on file   Tobacco Use    Smoking status: Every Day     Types: Cigarettes, Pipe    Smokeless tobacco: Never   Vaping Use    Vaping status: Never Used   Substance and Sexual Activity    Alcohol use: Yes     Comment: occasional    Drug use: Never    Sexual activity: Defer   Other Topics Concern    Not on file   Social History Narrative    Not on file     Social Determinants of Health     Financial Resource Strain: Not on file   Food Insecurity: Not on file   Transportation Needs: Not on file   Physical Activity: Not on file   Stress: Not on file   Social Connections: Not on file   Intimate Partner Violence: Not on file   Housing Stability: Not on file        No Known Allergies      Review of Systems  Gen: No fatigue, anorexia, insomnia, fever.   Eyes: No vision loss, double vision, drainage, eye pain.   ENT: No pharyngitis, dry mouth, no hearing changes or ear discharge  Cardiac: No  chest pain, palpitations, syncope, near syncope.   Pulmonary: No shortness of breath, cough, hemoptysis.   Heme/lymph: No swollen glands, fever, bleeding.   GI: No abdominal pain, change in bowel habits, melena, hematemesis, hematochezia, nausea, vomiting, diarrhea.   : No discharge, dysuria, frequency, urgency, hematuria.  Endo: No polyuria or weight loss.   Musculoskeletal: Negative for any pain or loss of ROM/weakness  Skin: No rashes or lesions  Neuro: Normal speech, no numbness or weakness. No gait difficulties  Review of systems is otherwise negative unless stated above or in history of present illness.    Physical Exam:  Constitutional:  no distress, alert and cooperative  Eyes: clear sclera  Head/Neck: No apparent injury, trachea midline  Respiratory/Thorax: Patent airways, thorax symmetric, breathing comfortably  Cardiovascular: no pitting edema  Gastrointestinal: Nondistended  Musculoskeletal: ROM intact  Extremities: no clubbing  Neurological: alert, quintero x4  Psychological: Appropriate affect    Results for orders placed or performed during the hospital encounter of 06/25/24 (from the past 24 hour(s))   Type And Screen   Result Value Ref Range    ABO TYPE A     Rh TYPE POS     ANTIBODY SCREEN NEG       Mark Dao is a 39 y.o. year old male patient who presents for LEFT partial tib/fib bone excision with Dr. Reynoso on 6/25/24. Acute Pain consulted for block for postoperative pain control.     Plan:    - LEFT femoral shot blocks performed post-operatively on 6/25/24  - Please be aware of local anesthetic toxic dose and absorption variability before considering lidocaine patches  - Acute pain service will see pt on POD1 if inpatient.  - Rest of pain management per primary team    Acute Pain Resident  pg [phone] ph [phone]
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
## 2024-06-25 NOTE — ANESTHESIA POSTPROCEDURE EVALUATION
Patient: Mark Dao    Procedure Summary       Date: 06/25/24 Room / Location: [facility] OR 09 / [facility] OR    Anesthesia Start: 0738 Anesthesia Stop: 0933    Procedure: Excision Bone Tibia/Fibula (Left: Leg Lower) Diagnosis:       Closed left ankle fracture, sequela      Other chronic osteomyelitis, left tibia and fibula (Multi)      (Closed left ankle fracture, sequela [S82.892S])      (Other chronic osteomyelitis, left tibia and fibula (Multi) [M86.662])    Surgeons: Osei Reynoso MD Responsible Provider: Treva Friend MD    Anesthesia Type: general ASA Status: 3            Anesthesia Type: general    Vitals Value Taken Time   /68 06/25/24 1045   Temp 36.3 °C (97.3 °F) 06/25/24 0930   Pulse 72 06/25/24 1049   Resp 14 06/25/24 1049   SpO2 96 % 06/25/24 1049   Vitals shown include unfiled device data.    Anesthesia Post Evaluation    Patient location during evaluation: PACU  Patient participation: complete - patient participated  Level of consciousness: awake and alert  Pain management: satisfactory to patient  Airway patency: patent  Cardiovascular status: acceptable and blood pressure returned to baseline  Respiratory status: acceptable  Hydration status: acceptable  Postoperative Nausea and Vomiting: none    There were no known notable events for this encounter.

## 2024-06-25 NOTE — H&P
[facility] Department of Orthopaedic Surgery   Surgical History & Physical <30 Days    Reason for Surgery: L tibial nail infection  Planned Procedure: L tibia removal of hardware, placement of antibiotic cement    History & Physical Reviewed:  I have reviewed the History and Physical within 30 days dated 6/14/24. Relevant findings and updates are noted below:  No significant changes.    39-year-old male with infected left tibial nail and a healed fracture in the setting of a previous open injury. I recommended hardware removal with saucerization internally of the tibia and injection of calcium sulfate orthopedic bone cement impregnated with antibiotics. I would likely also open to his previous open fracture site in the area of skin puckering as this is likely just a dry sinus that needs excisionally debrided and closed as well.     Home medications were reviewed with significant updates noted below:  No significant changes.    ERAS patient?: No    COVID-19 Risk Consent:   Surgeon has reviewed the key risks related to katie COVID-19 and subsequent sequelae.     06/25/24 at 4:57 AM - Jovani Robert MD
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
## 2024-06-25 NOTE — PROGRESS NOTES
"Mark Dao is a 39 y.o. male on day 1 of admission presenting with Closed left ankle fracture, sequela.    Orthopaedic Surgery Progress Note    S:  Seen and evaluated immediately postop in PACU. Reports localized L leg pain. Denies new numbness or weakness. Reports he does not want to move ankle 2/2 pain.       O:  /59   Pulse 79   Temp 36.3 °C (97.3 °F) (Temporal)   Resp 16   Ht 1.93 m (6' 4\")   Wt 100 kg (220 lb 7.4 oz)   SpO2 97%   BMI 26.84 kg/m²     Constitutional: NAD  HEENT: hearing and vision grossly intact, MMM  Resp: breathing comfortably   CV: extremities warm, well perfused  Neuro: alert, sensory and motor grossly intact  Psych: Appropriate mood and affect      MSK:  LLE:   - Dressing in place, c/d/i  -Tender at site of injury   -Motor intact in EHL/FHL. DF/PF limited 2/2   -SILT in saph/sural/SPN/DPN distributions  -Foot warm, well perfused  - Palpable DP/PT pulse, brisk cap refill  -Compartments soft and compressible      A/P: 39 y.o. male w a L tibial shaft fx w retained nail and chronic infection s/p removal of hardware, ream, irrigation and aspiration with injection of antibiotic cement on 6/25/2024 with Dr. Reynoso.  Intra-op findings with gross purulence and draining sinus tract.      Plan:  - Weight bearing: weightbearing as tolerated  - DVT ppx: SCDs, aspirin 81mg twice a day x 6 weeks  - Diet: Regular   - Pain: Multimodal  - ID consulted, appreciate recs  - Antibiotics:   - FEN: HLIV with good PO intake  - Bowel Regimen: Colace, senna, dulcolax  - PT/OT  - Pulm: Encourage IS  - Continue home medications      Dispo: pending PT, ID recs, postop course    Sherley Sidhu, PGY-3  Orthopaedic Surgery  Orthopaedic Trauma Team    This patient will be followed by the Orthopaedic Trauma service. Please page or Epic Chat the corresponding residents below with questions or concerns.      Ortho Trauma Service (Epic Chat Preferred)  First call: Jovani Robert, PGY1  Second call: Javy Duffy, " PGY2  Third call: Sherley Sidhu, PGY3    Please page [phone] on weekends or from 6PM - 6AM for any additional questions or concerns.

## 2024-06-26 ENCOUNTER — APPOINTMENT (OUTPATIENT)
Dept: RADIOLOGY | Facility: HOSPITAL | Age: 39
End: 2024-06-26
Payer: MEDICAID

## 2024-06-26 LAB
ANION GAP SERPL CALC-SCNC: 16 MMOL/L (ref 10–20)
BUN SERPL-MCNC: 16 MG/DL (ref 6–23)
CALCIUM SERPL-MCNC: 9.2 MG/DL (ref 8.6–10.6)
CHLORIDE SERPL-SCNC: 106 MMOL/L (ref 98–107)
CO2 SERPL-SCNC: 22 MMOL/L (ref 21–32)
CREAT SERPL-MCNC: 1.12 MG/DL (ref 0.5–1.3)
EGFRCR SERPLBLD CKD-EPI 2021: 86 ML/MIN/1.73M*2
ERYTHROCYTE [DISTWIDTH] IN BLOOD BY AUTOMATED COUNT: 15.3 % (ref 11.5–14.5)
GLUCOSE SERPL-MCNC: 67 MG/DL (ref 74–99)
HCT VFR BLD AUTO: 44.7 % (ref 41–52)
HGB BLD-MCNC: 14.3 G/DL (ref 13.5–17.5)
MCH RBC QN AUTO: 26.7 PG (ref 26–34)
MCHC RBC AUTO-ENTMCNC: 32 G/DL (ref 32–36)
MCV RBC AUTO: 83 FL (ref 80–100)
NRBC BLD-RTO: 0 /100 WBCS (ref 0–0)
PLATELET # BLD AUTO: 275 X10*3/UL (ref 150–450)
POTASSIUM SERPL-SCNC: 4.1 MMOL/L (ref 3.5–5.3)
RBC # BLD AUTO: 5.36 X10*6/UL (ref 4.5–5.9)
SODIUM SERPL-SCNC: 140 MMOL/L (ref 136–145)
VANCOMYCIN SERPL-MCNC: 5.9 UG/ML (ref 5–20)
WBC # BLD AUTO: 19.5 X10*3/UL (ref 4.4–11.3)

## 2024-06-26 PROCEDURE — 2500000001 HC RX 250 WO HCPCS SELF ADMINISTERED DRUGS (ALT 637 FOR MEDICARE OP): Mod: SE | Performed by: STUDENT IN AN ORGANIZED HEALTH CARE EDUCATION/TRAINING PROGRAM

## 2024-06-26 PROCEDURE — 36415 COLL VENOUS BLD VENIPUNCTURE: CPT | Performed by: STUDENT IN AN ORGANIZED HEALTH CARE EDUCATION/TRAINING PROGRAM

## 2024-06-26 PROCEDURE — 80202 ASSAY OF VANCOMYCIN: CPT | Performed by: STUDENT IN AN ORGANIZED HEALTH CARE EDUCATION/TRAINING PROGRAM

## 2024-06-26 PROCEDURE — 99231 SBSQ HOSP IP/OBS SF/LOW 25: CPT

## 2024-06-26 PROCEDURE — 36569 INSJ PICC 5 YR+ W/O IMAGING: CPT

## 2024-06-26 PROCEDURE — 2500000004 HC RX 250 GENERAL PHARMACY W/ HCPCS (ALT 636 FOR OP/ED): Mod: JZ,SE

## 2024-06-26 PROCEDURE — 82374 ASSAY BLOOD CARBON DIOXIDE: CPT | Performed by: STUDENT IN AN ORGANIZED HEALTH CARE EDUCATION/TRAINING PROGRAM

## 2024-06-26 PROCEDURE — 97530 THERAPEUTIC ACTIVITIES: CPT | Mod: GO

## 2024-06-26 PROCEDURE — 99232 SBSQ HOSP IP/OBS MODERATE 35: CPT | Performed by: INTERNAL MEDICINE

## 2024-06-26 PROCEDURE — 87040 BLOOD CULTURE FOR BACTERIA: CPT | Performed by: STUDENT IN AN ORGANIZED HEALTH CARE EDUCATION/TRAINING PROGRAM

## 2024-06-26 PROCEDURE — 80048 BASIC METABOLIC PNL TOTAL CA: CPT | Performed by: STUDENT IN AN ORGANIZED HEALTH CARE EDUCATION/TRAINING PROGRAM

## 2024-06-26 PROCEDURE — 2500000004 HC RX 250 GENERAL PHARMACY W/ HCPCS (ALT 636 FOR OP/ED): Mod: SE | Performed by: STUDENT IN AN ORGANIZED HEALTH CARE EDUCATION/TRAINING PROGRAM

## 2024-06-26 PROCEDURE — 85027 COMPLETE CBC AUTOMATED: CPT | Performed by: STUDENT IN AN ORGANIZED HEALTH CARE EDUCATION/TRAINING PROGRAM

## 2024-06-26 PROCEDURE — 1100000001 HC PRIVATE ROOM DAILY

## 2024-06-26 PROCEDURE — 87081 CULTURE SCREEN ONLY: CPT

## 2024-06-26 PROCEDURE — C1751 CATH, INF, PER/CENT/MIDLINE: HCPCS

## 2024-06-26 PROCEDURE — 97161 PT EVAL LOW COMPLEX 20 MIN: CPT | Mod: GP

## 2024-06-26 PROCEDURE — 2500000001 HC RX 250 WO HCPCS SELF ADMINISTERED DRUGS (ALT 637 FOR MEDICARE OP): Mod: SE

## 2024-06-26 PROCEDURE — 7100000011 HC EXTENDED STAY RECOVERY HOURLY - NURSING UNIT

## 2024-06-26 PROCEDURE — 97165 OT EVAL LOW COMPLEX 30 MIN: CPT | Mod: GO

## 2024-06-26 PROCEDURE — 2780000003 HC OR 278 NO HCPCS

## 2024-06-26 RX ORDER — CALCIUM CARBONATE 200(500)MG
1000 TABLET,CHEWABLE ORAL 4 TIMES DAILY PRN
Status: DISCONTINUED | OUTPATIENT
Start: 2024-06-26 | End: 2024-06-29 | Stop reason: HOSPADM

## 2024-06-26 RX ORDER — OXYCODONE HYDROCHLORIDE 5 MG/1
5 TABLET ORAL EVERY 6 HOURS PRN
Qty: 28 TABLET | Refills: 0 | Status: SHIPPED | OUTPATIENT
Start: 2024-06-26 | End: 2024-06-29 | Stop reason: HOSPADM

## 2024-06-26 RX ORDER — MORPHINE SULFATE 15 MG/1
15 TABLET ORAL EVERY 4 HOURS PRN
Status: DISCONTINUED | OUTPATIENT
Start: 2024-06-26 | End: 2024-06-29 | Stop reason: HOSPADM

## 2024-06-26 RX ORDER — ASPIRIN 81 MG/1
81 TABLET ORAL 2 TIMES DAILY
Qty: 56 TABLET | Refills: 0 | Status: SHIPPED | OUTPATIENT
Start: 2024-06-26 | End: 2024-07-27

## 2024-06-26 RX ORDER — MORPHINE SULFATE 15 MG/1
7.5 TABLET ORAL EVERY 4 HOURS PRN
Status: DISCONTINUED | OUTPATIENT
Start: 2024-06-26 | End: 2024-06-29 | Stop reason: HOSPADM

## 2024-06-26 RX ORDER — PANTOPRAZOLE SODIUM 20 MG/1
20 TABLET, DELAYED RELEASE ORAL
Status: DISCONTINUED | OUTPATIENT
Start: 2024-06-27 | End: 2024-06-29 | Stop reason: HOSPADM

## 2024-06-26 RX ORDER — CEFAZOLIN SODIUM 2 G/50ML
2 SOLUTION INTRAVENOUS EVERY 8 HOURS
Qty: 6150 ML | Refills: 0 | Status: SHIPPED | OUTPATIENT
Start: 2024-06-26 | End: 2024-06-27

## 2024-06-26 RX ORDER — POLYETHYLENE GLYCOL 3350 17 G/17G
17 POWDER, FOR SOLUTION ORAL DAILY
Qty: 30 PACKET | Refills: 0 | Status: SHIPPED | OUTPATIENT
Start: 2024-06-26 | End: 2024-07-29

## 2024-06-26 RX ORDER — CEFAZOLIN SODIUM 2 G/100ML
2 INJECTION, SOLUTION INTRAVENOUS EVERY 8 HOURS
Status: DISCONTINUED | OUTPATIENT
Start: 2024-06-26 | End: 2024-06-29 | Stop reason: HOSPADM

## 2024-06-26 RX ORDER — FERROUS SULFATE, DRIED 160(50) MG
1 TABLET, EXTENDED RELEASE ORAL 2 TIMES DAILY
Qty: 180 TABLET | Refills: 0 | Status: SHIPPED | OUTPATIENT
Start: 2024-06-26 | End: 2024-09-24

## 2024-06-26 RX ORDER — ACETAMINOPHEN 500 MG
500 TABLET ORAL EVERY 6 HOURS
Qty: 112 TABLET | Refills: 0 | Status: SHIPPED | OUTPATIENT
Start: 2024-06-26 | End: 2024-07-27

## 2024-06-26 RX ORDER — LIDOCAINE HYDROCHLORIDE 10 MG/ML
5 INJECTION INFILTRATION; PERINEURAL ONCE
Status: DISCONTINUED | OUTPATIENT
Start: 2024-06-26 | End: 2024-06-29 | Stop reason: HOSPADM

## 2024-06-26 ASSESSMENT — COGNITIVE AND FUNCTIONAL STATUS - GENERAL
MOBILITY SCORE: 20
WALKING IN HOSPITAL ROOM: A LITTLE
STANDING UP FROM CHAIR USING ARMS: A LITTLE
MOVING TO AND FROM BED TO CHAIR: A LITTLE
WALKING IN HOSPITAL ROOM: A LITTLE
MOBILITY SCORE: 20
MOVING TO AND FROM BED TO CHAIR: A LITTLE
CLIMB 3 TO 5 STEPS WITH RAILING: A LITTLE
STANDING UP FROM CHAIR USING ARMS: A LITTLE
DAILY ACTIVITIY SCORE: 24
CLIMB 3 TO 5 STEPS WITH RAILING: A LITTLE
DAILY ACTIVITIY SCORE: 24

## 2024-06-26 ASSESSMENT — PAIN SCALES - GENERAL
PAINLEVEL_OUTOF10: 3
PAINLEVEL_OUTOF10: 7
PAINLEVEL_OUTOF10: 6
PAINLEVEL_OUTOF10: 7
PAINLEVEL_OUTOF10: 7
PAINLEVEL_OUTOF10: 3
PAINLEVEL_OUTOF10: 3

## 2024-06-26 ASSESSMENT — PAIN DESCRIPTION - ORIENTATION: ORIENTATION: LEFT

## 2024-06-26 ASSESSMENT — PAIN - FUNCTIONAL ASSESSMENT
PAIN_FUNCTIONAL_ASSESSMENT: 0-10

## 2024-06-26 ASSESSMENT — PAIN SCALES - PAIN ASSESSMENT IN ADVANCED DEMENTIA (PAINAD)
TOTALSCORE: MEDICATION (SEE MAR)
TOTALSCORE: MEDICATION (SEE MAR)

## 2024-06-26 ASSESSMENT — ACTIVITIES OF DAILY LIVING (ADL)
ADL_ASSISTANCE: INDEPENDENT
BATHING_ASSISTANCE: MODIFIED INDEPENDENT (DEVICE)

## 2024-06-26 ASSESSMENT — PAIN DESCRIPTION - DESCRIPTORS: DESCRIPTORS: SHOOTING

## 2024-06-26 NOTE — CARE PLAN
Problem: Pain  Goal: Takes deep breaths with improved pain control throughout the shift  Outcome: Progressing  Goal: Turns in bed with improved pain control throughout the shift  Outcome: Progressing  Goal: Walks with improved pain control throughout the shift  Outcome: Progressing  Goal: Performs ADL's with improved pain control throughout shift  Outcome: Progressing  Goal: Participates in PT with improved pain control throughout the shift  Outcome: Progressing  Goal: Free from opioid side effects throughout the shift  Outcome: Progressing  Goal: Free from acute confusion related to pain meds throughout the shift  Outcome: Progressing     Problem: Fall/Injury  Goal: Not fall by end of shift  Outcome: Progressing  Goal: Be free from injury by end of the shift  Outcome: Progressing  Goal: Verbalize understanding of personal risk factors for fall in the hospital  Outcome: Progressing  Goal: Verbalize understanding of risk factor reduction measures to prevent injury from fall in the home  Outcome: Progressing  Goal: Use assistive devices by end of the shift  Outcome: Progressing  Goal: Pace activities to prevent fatigue by end of the shift  Outcome: Progressing     Problem: Pain - Adult  Goal: Verbalizes/displays adequate comfort level or baseline comfort level  Outcome: Progressing     Problem: Safety - Adult  Goal: Free from fall injury  Outcome: Progressing     Problem: Discharge Planning  Goal: Discharge to home or other facility with appropriate resources  Outcome: Progressing     Problem: Chronic Conditions and Co-morbidities  Goal: Patient's chronic conditions and co-morbidity symptoms are monitored and maintained or improved  Outcome: Progressing    The clinical goals for the shift include Patient's pain controlled to tolerable level. Patient compliant with DVT prophylaxis. Patient remains safe and free from falls.
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
## 2024-06-26 NOTE — PROGRESS NOTES
"Mark Dao is a 39 y.o. male on day 1 of admission presenting with Closed left ankle fracture, sequela.    Orthopaedic Surgery Progress Note    S:  Had a lot of pain immediately postop, but it has improved significantly overnight and he was able to sleep. No chest pain, shortness of breath, fevers.      O:  /65 (BP Location: Right arm, Patient Position: Lying)   Pulse 85   Temp 36.7 °C (98.1 °F) (Temporal)   Resp 16   Ht 1.93 m (6' 4\")   Wt 100 kg (220 lb 7.4 oz)   SpO2 98%   BMI 26.84 kg/m²     Constitutional: NAD  HEENT: hearing and vision grossly intact, MMM  Resp: breathing comfortably   CV: extremities warm, well perfused  Neuro: alert, sensory and motor grossly intact  Psych: Appropriate mood and affect      MSK:  LLE:   - Dressing in place, c/d/i  -Tender at site of injury   -Motor intact in EHL/FHL/DF/PF  -SILT in saph/sural/SPN/DPN distributions  -Foot warm, well perfused  - Palpable DP/PT pulse, brisk cap refill  -Compartments soft and compressible      A/P: 39 y.o. male w a L tibial shaft fx w retained nail and chronic infection s/p removal of hardware, ream, irrigation and aspiration with injection of antibiotic cement on 6/25/2024 with Dr. Reynoso.  Intra-op findings with gross purulence and draining sinus tract.      Plan:  - Weight bearing: weightbearing as tolerated  - DVT ppx: SCDs, aspirin 81mg twice a day x 6 weeks  - OR Cx: GPCs  - ID consult, appreciate recs: vanc/ceftriaxone pending cx  - Diet: Regular   - Pain: Multimodal  - FEN: HLIV with good PO intake  - Bowel Regimen: Colace, senna, dulcolax  - PT/OT  - Pulm: Encourage IS  - Continue home medications      Dispo: pending PT, ID recs, postop course    Jovani Robert MD   Orthopaedic Surgery  Orthopaedic Trauma Team    This patient will be followed by the Orthopaedic Trauma service. Please page or Epic Chat the corresponding residents below with questions or concerns.      Ortho Trauma Service (Epic Chat Preferred)  First " call: Jovani Robert, PGY1  Second call: Javy Duffy, PGY2  Third call: Sherley Sidhu, PGY3    Please page [phone] on weekends or from 6PM - 6AM for any additional questions or concerns.

## 2024-06-26 NOTE — PROGRESS NOTES
Occupational Therapy    Evaluation and Treatment    Patient Name: Mark Dao  MRN: [id number]  Today's Date: 6/26/2024  Room: [address] [place], [place] [address]  Time Calculation  Start Time: 0831  Stop Time: 0858  Time Calculation (min): 27 min    Assessment  IP OT Assessment  OT Assessment: Pt presents at reported baseline. Pt demonstrated IND/Mod(I)-SBA with household distance ambulation, simulated ADL tasks, balance and functional mobility/transfers. Pt has good social support, problem solving skills, and good insight. Pt verbalized understanding of all education this day. Pt has no skilled OT needs at this time.  Prognosis: Good  Barriers to Discharge: None  Evaluation/Treatment Tolerance: Patient tolerated treatment well  Medical Staff Made Aware: Yes  End of Session Communication: Bedside nurse (PT)  End of Session Patient Position: Bed, 2 rail up, Alarm off, caregiver present  Plan:  Inpatient Plan  No Skilled OT: At baseline function  OT Frequency: OT eval only  OT Discharge Recommendations: No further acute OT, No OT needed after discharge  OT Recommended Transfer Status: Assist of 1  OT - OK to Discharge: Yes  OT Assessment  Prognosis: Good  Barriers to Discharge: None  Evaluation/Treatment Tolerance: Patient tolerated treatment well  Medical Staff Made Aware: Yes  Strengths: Ability to acquire knowledge, Attitude of self, Capable of completing ADLs semi/independent, Coping skills, Insight into problems, Support of Caregivers, Living arrangement secure  Barriers to Participation: Comorbidities    Subjective   Current Problem:  1. Closed left ankle fracture, sequela  Fungal Culture/Smear    Fungal Culture/Smear    Fungal Culture/Smear    Fungal Culture/Smear    Tissue/Wound Culture/Smear    Tissue/Wound Culture/Smear    Tissue/Wound Culture/Smear    Tissue/Wound Culture/Smear      2. Other chronic osteomyelitis, left tibia and fibula (Multi)  Fungal Culture/Smear    Fungal Culture/Smear    Fungal Culture/Smear    Fungal  Culture/Smear    Tissue/Wound Culture/Smear    Tissue/Wound Culture/Smear    Tissue/Wound Culture/Smear    Tissue/Wound Culture/Smear        General:  Reason for Referral: L tibial shaft fx w retained nail and chronic infection s/p removal of hardware, ream, irrigation and aspiration with injection of antibiotic cement on 6/25/2024 with Dr. Reynoso.  Past Medical History Relevant to Rehab: No pertinent past medical history other than reason for admission  Prior to Session Communication: Bedside nurse  Patient Position Received: Bed, 2 rail up, Alarm on  Family/Caregiver Present: No  General Comment: Pt supine in bed upon arrival. Pleasant and agreeable to OT eval. Pt reporting he has no rehab experience   Precautions:  LE Weight Bearing Status: Weight Bearing as Tolerated (LLE)  Medical Precautions: Fall precautions    Pain:  Pain Assessment  Pain Assessment: 0-10  0-10 (Numeric) Pain Score: 3  Pain Type: Acute pain  Pain Location: Leg  Pain Orientation: Left  Pain Interventions: Repositioned, Ambulation/increased activity, Relaxation technique, Rest  Response to Interventions: Pt resting comfortably in bed at end of session    Objective   Cognition:  Overall Cognitive Status: Within Functional Limits  Orientation Level: Oriented X4    Home Living:  Type of Home: House  Lives With: Parent(s) (Mother)  Home Adaptive Equipment: Cane, Crutches, Wheelchair-manual, Other (Comment) (knee scooter)  Home Layout: One level, Laundry main level, Able to live on main level with bedroom/bathroom  Home Access: Stairs to enter with rails  Entrance Stairs-Rails: None  Entrance Stairs-Number of Steps: 2  Bathroom Shower/Tub: Tub/shower unit  Bathroom Toilet: Standard  Bathroom Equipment: Hand-held shower hose, Built-in shower seat  Bathroom Accessibility: Pt reports some difficulty transferring into and out of shower but manages with sit pivot on side of tub, uninterested in tub transfer bench after education  Home Living Comments:  Pt previously lived on his own but moved in with his mother due to increased need for assistance (mainly homemaking tasks) about a year ago   Prior Function:  Level of [place]: Independent with ADLs and functional transfers, Needs assistance with homemaking  Receives Help From: Parent(s) (mom)  ADL Assistance: Independent  Homemaking Assistance: Needs assistance  Homemaking Assistance Comments: Mom is primary homemaker, pt occasionaly cooks simple meals, light house chores but mother completes majority of homemaking tasks per report  Ambulatory Assistance: Needs assistance (Use of cane for most mobility but uses knee scooter for community/long distances)  Hand Dominance: Right  IADL History:  Homemaking Responsibilities: No  Current License: Yes  Mode of Transportation: Motorcycle  Occupation: Unemployed  Type of Occupation: Previously ,  but no longer able  Leisure and Hobbies: Riding motorcycle, [employer]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[employer]), previously bull riding  IADL Comments: As active as he can be with limited community mobility per report  ADL:  Eating Assistance: Independent (Anticipated)  Grooming Assistance: Independent (Anticipated)  Bathing Assistance: Modified independent (Device) (Anticipated)  UE Dressing Assistance: Independent (Anticipated)  LE Dressing Assistance: Independent (As demonstrated)  Toileting Assistance with Device: Independent (As simulated on toilet)  Activity Tolerance:  Endurance: Endurance does not limit participation in activity  Balance:  Dynamic Sitting Balance  Dynamic Sitting-Comments: SBA  Dynamic Standing Balance  Dynamic Standing-Comments: SBA with FWW  Static Sitting Balance  Static Sitting-Comment/Number of Minutes: IND  Static Standing Balance  Static Standing-Comment/Number of Minutes: SBA with FWW  Bed Mobility/Transfers: Bed Mobility/Transfers: Bed Mobility  Bed Mobility: Yes  Bed Mobility 1  Bed Mobility 1: Supine to sitting, Sitting to  supine  Level of Assistance 1: Modified independent  Bed Mobility Comments 1: Uses RLE to manage LLE during mobility (hooks R foot around L heel)  Functional Mobility  Functional Mobility Performed: Yes  Functional Mobility 1  Comments 1: Pt ambulated MIN household distance in room using FWW and with SBA only for safety   and Transfers  Transfer: Yes  Transfer 1  Transfer From 1: Bed to  Transfer to 1: Stand  Technique 1: Sit to stand  Transfer Device 1: Walker  Transfer Level of Assistance 1: Close supervision, Minimal verbal cues  Trials/Comments 1: VCs for FWW safety  Transfers 2  Transfer From 2: Stand to, Toilet to  Transfer to 2: Toilet, Stand  Technique 2: Stand to sit, Sit to stand  Transfer Device 2: Walker  Transfer Level of Assistance 2: Close supervision, Minimal verbal cues  Trials/Comments 2: VCs for positioning and safe transfer techniques  Transfers 3  Transfer From 3: Stand to  Transfer to 3: Bed  Technique 3: Stand to sit  Transfer Device 3: Walker  Transfer Level of Assistance 3: Distant supervision  IADL's:   Homemaking Responsibilities: No  Current License: Yes  Mode of Transportation: Motorcycle  Occupation: Unemployed  Type of Occupation: Previously ,  but no longer able  Leisure and Hobbies: Riding [employer] Supercycle, [email or web address]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[employer]), previously bull riding  IADL Comments: As active as he can be with limited community mobility per report  Vision: Vision - Basic Assessment  Current Vision: No visual deficits   and Vision - Complex Assessment  Ocular Range of Motion: Within Functional Limits  Sensation:  Light Touch: No apparent deficits (BUE)  Strength:  Strength Comments: BUE WFL  Perception:  Inattention/Neglect: Appears intact  Coordination:  Movements are Fluid and Coordinated: Yes   Hand Function:  Hand Function  Gross Grasp: Functional  Coordination: Functional  Extremities:   RUE   RUE : Within Functional Limits (R shoulder flexion 4+/5 all other RUE  muscles 5/5), LUE   LUE: Within Functional Limits,  , and        Outcome Measures: [facility] Daily Activity  Putting on and taking off regular lower body clothing: None  Bathing (including washing, rinsing, drying): None  Putting on and taking off regular upper body clothing: None  Toileting, which includes using toilet, bedpan or urinal: None  Taking care of personal grooming such as brushing teeth: None  Eating Meals: None  Daily Activity - Total Score: 24    OT Adult Other Outcome Measures  4AT: -    Education Documentation  Body Mechanics, taught by Rashard Grover OT at 6/26/2024 11:56 AM.  Learner: Patient  Readiness: Acceptance  Method: Explanation  Response: Verbalizes Understanding, Demonstrated Understanding    Precautions, taught by Rashard Grover OT at 6/26/2024 11:56 AM.  Learner: Patient  Readiness: Acceptance  Method: Explanation  Response: Verbalizes Understanding, Demonstrated Understanding    Education Comments  No comments found.      Treatment Completed on Evaluation    Therapy/Activity:     Therapeutic Activity  Therapeutic Activity Performed: Yes  Therapeutic Activity 1: Pt educated on safety, energy conservation techniques, compensatory techniques in relation to IADLs  Therapeutic Activity 2: Pt educated on safe positioning and transfer techniques    06/26/24 at 11:59 AM   Rashard Grover OT   Rehab Office: [phone]

## 2024-06-26 NOTE — OP NOTE
Excision Bone Tibia/Fibula (L) Operative Note     Date: 2024  OR Location: [facility] OR    Name: Mark Dao, : 1985, Age: 39 y.o., MRN: [id number], Sex: male    Diagnosis  Pre-op Diagnosis     * Closed left ankle fracture, sequela [S82.892S]     * Other chronic osteomyelitis, left tibia and fibula (Multi) [M86.662] Post-op Diagnosis     * Closed left ankle fracture, sequela [S82.892S]     * Other chronic osteomyelitis, left tibia and fibula (Multi) [M86.662]     Procedures  Saucerization left tibia    Removal deep implant left tibia    Injection of intramedullary orthopedic bone cement void filler    Excisional debridement of skin subcutaneous tissue and fascia less than 20 cm² distal tibia    Excisional debridement of skin and subcutaneous tissue less than 20 cm² proximal tibia    Complex closure 5 cm in length distal right tibia requiring the undermining of skin flaps and tissue advancement    Surgeons      * Osei Reynoso - Primary    Resident/Fellow/Other Assistant:  Surgeons and Role:     * Sherley Sidhu MD - Resident - Assisting     * Mark Tobin DPM - Resident - Assisting     * Debbi Ward PA-C - MARANDA First Assist  First Assistant: Debbi Ward PA-C, who was critical and essential as a first assistant as there was no qualified resident available.    Procedure Summary  Anesthesia: General  ASA: III  Anesthesia Staff: Anesthesiologist: Treva Friend MD  CRNA: ALFREDA Billingsley-CRNA  C-AA: GREGORIA Bean  SRNA: Khadijah Porter  Estimated Blood Loss: Please see anesthesia record  Intra-op Medications:   Administrations occurring from 0715 to 0835 on 24:   Medication Name Total Dose   sodium chloride 0.9 % irrigation solution 4,000 mL   vancomycin (Vancocin) vial for injection 2 g   tobramycin (Nebcin) injection 2,400 mg              Anesthesia Record               Intraprocedure I/O Totals          Intake    Tranexamic Acid 0.00 mL    The total shown is the total  volume documented since Anesthesia Start was filed.    lactated Ringer's 900.00 mL    Total Intake 900 mL       Output    Est. Blood Loss 250 mL    Total Output 250 mL       Net    Net Volume 650 mL          Specimen:   ID Type Source Tests Collected by Time   A : LEFT TIBIA NAIL #1 Swab NAIL BIOPSY FUNGAL CULTURE/SMEAR, TISSUE/WOUND CULTURE/SMEAR Osei Reynoso MD 6/25/2024 0826   B : LEFT TIBIA NAIL #2 Swab NAIL BIOPSY FUNGAL CULTURE/SMEAR, TISSUE/WOUND CULTURE/SMEAR Osei Reynoso MD 6/25/2024 0826        Staff:   Circulator: Mirta  Scrub Person: Sara         Drains and/or Catheters: * None in log *    Tourniquet Times:         Implants:  Implants       Type Name Action Serial No.      Graft STIMULAN KIT, RAPID CURE, 10CC - SN/A - [id number] Implanted N/A     Screw WIRE, ROVERTO 3 X 285 - SN/A - [id number] Used, Not Implanted N/A              Findings: Chronic draining sinus proximal medial cross lock screw from implanted tibial nail previously done elsewhere in [place] with osteomyelitis of the tibia.    Indications: Mark Dao is an 39 y.o. male who is having surgery for Closed left ankle fracture, sequela [S82.892S]  Other chronic osteomyelitis, left tibia and fibula (Multi) [M86.662].     The patient was seen in the preoperative area. The risks, benefits, complications, treatment options, non-operative alternatives, expected recovery and outcomes were discussed with the patient. The possibilities of reaction to medication, pulmonary aspiration, injury to surrounding structures, bleeding, recurrent infection, the need for additional procedures, failure to diagnose a condition, and creating a complication requiring transfusion or operation were discussed with the patient. The patient concurred with the proposed plan, giving informed consent.  The site of surgery was properly noted/marked if necessary per policy. The patient has been actively warmed in preoperative area. Preoperative  antibiotics have been ordered and given following the initial culture. Venous thrombosis prophylaxis have been ordered including unilateral sequential compression device    Procedure Details: The patient was met in the preoperative holding area and identified by name medical record number.  He was transferred to the operating room and positioned supine on a vascular diving board table.  All bony prominences were padded.  Preoperative antibiotic prophylaxis was administered after the first culture was taken.  Preoperative timeout was performed by myself prior to prepping and the correct operative site was identified.  The limb was then prepped and draped in usual sterile fashions and ChloraPrep.  We first started with an incision of the distal medial tibia to remove the distal cross lock screws.  A 4.7 mm hex  was then used to remove the [employer] nail.  The lateral proximal oblique cross lock was then removed through percutaneous stab incision and the medial oblique proximal cross lock screw was removed through the draining sinus which was first excisionally debrided the levels of the skin and subcutaneous tissue less than 20 cm² using a 10 blade scalpel.  After that was complete we then remove the screw.  We then reopened his suprapatellar arthrotomy with a 10 blade scalpel.  We recannulated the patellofemoral joint and then ultimately the nail by first using a guidewire and an entry reamer to expose the proximal end of the nail.  A cannulated extraction bolt was then used while he kept the padded sleeve in place and extracted the nail.  The nail was grossly purulent.  We cultured 2 areas of the nail and also obtain some tissue from the medial wound as well.  We then introduced the ball-tipped guidewire and reamed from an 11.5 mm reamer up to a 12.5 mm reamer.  We had good fit along the isthmus and saucerized the internal aspect of the tibia in this method.  Several passes of the reamer were  placed to remove all fibrinous material and infected debris.  We then first copiously irrigated the knee joint itself.  We then ellipsed out an area of healed previous sinus that was an excisional debridement of the skin and subcutaneous tissue and underlying fascial tissue down directly to and including the periosteum.  This was excised using a 10 blade scalpel.  This was the area of the previously draining sinus tract that it healed.  It was healed down to the tibia rigidly and appeared to be a direct communication path into the tibia with just the superficial scab.  It did track down directly to bone and into an open area of circular defect into the tibia.  This was also excisionally debrided with a small rongeur and curette.  We then created a high flow irrigation system with low-pressure cystoscopy tubing irrigation through the proximal aspect of the medial cross lock screw with suction and the previous sinus tract gradient and irrigation through the center aspect of the tibia we also did this with Irrisept and irrigated the joint thoroughly as well.  We then mixed 60 cc of Stimulan orthopedic bone cement impregnated with vancomycin and tobramycin, 2 g and 2.4 g respectively.  This was then injected in liquid paste form throughout the length of the tibia under fluoroscopic guidance.  We then irrigated the joint again.  At this point the wounds were then closed in layers with monofilament after they were individually irrigated.  The wound that was the previous sinus tract that required periosteal debridement was exceptionally scarred down and required undermining of skin flaps and tissue advancement for closure that had to be elevated with a periosteal elevator and a central retention suture was used to advance the tissue flaps.  This was then closed in a standard fashion with dermal and skin suture with vertical mattresses at the skin layer the remainder the wounds were closed in layers in a standard fashion  and sterile dressings were applied.    Postoperative plan:    Weight-bear as tolerated left lower extremity, DVT prophylaxis with aspirin 81 twice daily.  Calcium vitamin D protocol for bone health.  Follow cultures expectantly.  Consult infectious disease and likely need a PICC line for long-term IV antibiotics.  Follow-up in the office in 2 weeks time for wound check possible suture and staple removal and 2 views left tibia.    Natural History reviewed. All questions answered. The patient was in agreement with the plan.      **This note was created using voice recognition software and was not corrected for typographical or grammatical errors.**  Complications:  None; patient tolerated the procedure well.    Disposition: PACU - hemodynamically stable.  Condition: stable         Additional Details: None additional    Attending Attestation: I was present and scrubbed for the key portions of the procedure.    Osei Reynoso  Phone Number: [phone]

## 2024-06-26 NOTE — PROGRESS NOTES
Physical Therapy    Physical Therapy Evaluation    Patient Name: Mark Dao  MRN: [id number]  Today's Date: 6/26/2024   Time Calculation  Start Time: 0859  Stop Time: 0909  Time Calculation (min): 10 min    Assessment/Plan   PT Assessment  End of Session Communication: Bedside nurse  Assessment Comment: Pt participated in therapy with no inc in pain and no reports of fatigue. Pt AMB 100ft total with FWW and close supervision. Pt does not have any acute PT needs and does not need PT post discharge. At this time PT will sign off but will remain available for reconsult as needed.  End of Session Patient Position: Bed, 2 rail up, Alarm off, not on at start of session  IP OR SWING BED PT PLAN  Inpatient or Swing Bed: Inpatient  PT Plan  PT Plan: PT Eval only  PT Eval Only Reason: Safe to return home  PT Frequency: PT eval only  PT Discharge Recommendations: No further acute PT, No PT needed after discharge  Equipment Recommended upon Discharge: Wheeled walker  PT Recommended Transfer Status: Stand by assist  PT - OK to Discharge: Yes (eval complete and discharge recommendations made)      Subjective   General Visit Information:  General  Reason for Referral: L tibial shaft fx w retained nail and chronic infection s/p removal of hardware, ream, irrigation and aspiration with injection of antibiotic cement on 6/25/2024 with Dr. Reynoso.  Past Medical History Relevant to Rehab: No pertinent past medical history other than reason for admission  Missed Visit: No  Family/Caregiver Present: No  Prior to Session Communication: Bedside nurse  Patient Position Received: Bed, 2 rail up, Alarm off, not on at start of session (RN reports pt keeps unplugging bed)  General Comment: pt alert and agreeable to therapy  Home Living:  Home Living  Type of Home: House  Lives With: Parent(s) (mother)  Home Adaptive Equipment: Cane, Crutches, Wheelchair-manual, Other (Comment) (knee scooter)  Home Layout: One level, Laundry main level, Able  to live on main level with bedroom/bathroom  Home Access: Stairs to enter with rails, Stairs to enter without rails  Entrance Stairs-Rails: None  Entrance Stairs-Number of Steps: 2  Bathroom Shower/Tub: Tub/shower unit  Bathroom Equipment: Hand-held shower hose, Built-in shower seat  Home Living Comments: uses cane at baseline at home and knee scooter in the community  Prior Level of Function:  Prior Function Per Pt/Caregiver Report  Level of [place]: Independent with ADLs and functional transfers, Needs assistance with homemaking  Receives Help From: Parent(s) (mom)  Hand Dominance: Right  Precautions:  Precautions  LE Weight Bearing Status: Weight Bearing as Tolerated (LLE)  Medical Precautions: Fall precautions    Objective   Pain:  Pain Assessment  Pain Assessment: 0-10  0-10 (Numeric) Pain Score: 3  Pain Type: Acute pain  Pain Location: Leg  Pain Orientation: Left  Pain Interventions: Repositioned, Ambulation/increased activity, Rest  Response to Interventions: no change in pain  Cognition:  Cognition  Overall Cognitive Status: Within Functional Limits  Orientation Level: Oriented X4  Insight: Mild  Impulsive: Moderately  Processing Speed: Within funtional limits    General Assessments:  Activity Tolerance  Endurance: Endurance does not limit participation in activity    Sensation  Light Touch: No apparent deficits    Strength  Strength Comments: WFL  Postural Control  Postural Control: Within Functional Limits    Static Sitting Balance  Static Sitting-Balance Support: Bilateral upper extremity supported, Feet supported  Static Sitting-Level of Assistance: Independent  Dynamic Sitting Balance  Dynamic Sitting-Balance Support: Bilateral upper extremity supported, Feet supported  Dynamic Sitting-Balance: Forward lean  Dynamic Sitting-Comments: ind    Static Standing Balance  Static Standing-Balance Support: Bilateral upper extremity supported (FWW)  Static Standing-Level of Assistance: Close  supervision  Dynamic Standing Balance  Dynamic Standing-Balance Support: Bilateral upper extremity supported (FWW)  Dynamic Standing-Balance: Turning  Dynamic Standing-Comments: close sup  Functional Assessments:  Bed Mobility  Bed Mobility: Yes  Bed Mobility 1  Bed Mobility 1: Supine to sitting, Sitting to supine  Level of Assistance 1: Modified independent  Bed Mobility Comments 1: Uses RLE to manage LLE during mobility    Transfers  Transfer: Yes  Transfer 1  Transfer From 1: Sit to, Stand to  Transfer to 1: Sit, Stand  Technique 1: Sit to stand, Stand to sit  Transfer Device 1: Walker  Transfer Level of Assistance 1: Close supervision    Ambulation/Gait Training  Ambulation/Gait Training Performed: Yes  Ambulation/Gait Training 1  Surface 1: Level tile  Device 1: Rolling walker  Assistance 1: Close supervision  Quality of Gait 1: Decreased step length  Comments/Distance (ft) 1: 50ft x2 (pt did not put any weight in LLE due to knee pain)    Stairs  Stairs: No (pt politely declined)  Extremity/Trunk Assessments:  Strength RLE  RLE Overall Strength: Greater than or equal to 3/5 as evidenced by functional mobility  Strength LLE  LLE Overall Strength: Greater than or equal to 3/5 as evidenced by functional mobility  Outcome Measures:  [facility] Basic Mobility  Turning from your back to your side while in a flat bed without using bedrails: None  Moving from lying on your back to sitting on the side of a flat bed without using bedrails: None  Moving to and from bed to chair (including a wheelchair): A little  Standing up from a chair using your arms (e.g. wheelchair or bedside chair): A little  To walk in hospital room: A little  Climbing 3-5 steps with railing: A little  Basic Mobility - Total Score: 20      Education Documentation  Precautions, taught by FRANCES PaezPT at 6/26/2024  1:15 PM.  Learner: Patient  Readiness: Acceptance  Method: Explanation  Response: Verbalizes Understanding  Comment: WBAT LLE    Body  Mechanics, taught by PRASANNA Paez at 6/26/2024  1:15 PM.  Learner: Patient  Readiness: Acceptance  Method: Explanation  Response: Verbalizes Understanding  Comment: WBAT LLE    Mobility Training, taught by PRASANNA Paez at 6/26/2024  1:15 PM.  Learner: Patient  Readiness: Acceptance  Method: Explanation  Response: Verbalizes Understanding  Comment: WBAT LLE    Education Comments  No comments found.

## 2024-06-26 NOTE — PROGRESS NOTES
Mark Dao is a 39 y.o. year old male patient who presents for LEFT partial tib/fib bone excision with Dr. Reynoso on 6/25/24. Acute Pain consulted for block for postoperative pain control.     Postop Pain HPI -   Palliative: relieved with IV analgesics and regional local anesthetics  Provocative: movement  Quality:  burning and aching  Radiation:  none  Severity:  4/10  Timing: constant    24-HOUR OPIOID CONSUMPTION:  Morphine 6 mg   Oxycodone 10 mg     Scheduled medications  acetaminophen, 650 mg, oral, q6h ROGER  aspirin, 81 mg, oral, BID  cefTRIAXone, 2 g, intravenous, q24h  gabapentin, 100 mg, oral, q8h ROGER  methocarbamol, 750 mg, oral, q8h ROGER  polyethylene glycol, 17 g, oral, Daily  sennosides-docusate sodium, 2 tablet, oral, BID  vancomycin, 1,000 mg, intravenous, q12h      Continuous medications     PRN medications  PRN medications: diphenhydrAMINE, morphine, naloxone, ondansetron **OR** ondansetron, oxyCODONE, oxyCODONE, vancomycin     Physical Exam:  Constitutional:  no distress, alert and cooperative  Eyes: clear sclera  Head/Neck: No apparent injury, trachea midline  Respiratory/Thorax: Patent airways, thorax symmetric, breathing comfortably  Cardiovascular: no pitting edema  Gastrointestinal: Nondistended  Musculoskeletal: ROM intact  Extremities: no clubbing  Neurological: alert, quintero x4  Psychological: Appropriate affect    Results for orders placed or performed during the hospital encounter of 06/25/24 (from the past 24 hour(s))   Type And Screen   Result Value Ref Range    ABO TYPE A     Rh TYPE POS     ANTIBODY SCREEN NEG    Tissue/Wound Culture/Smear    Specimen: NAIL BIOPSY; Swab   Result Value Ref Range    Gram Stain No polymorphonuclear leukocytes seen (A)     Gram Stain (4+) Abundant Gram positive cocci (A)    Tissue/Wound Culture/Smear    Specimen: NAIL BIOPSY; Swab   Result Value Ref Range    Gram Stain No polymorphonuclear leukocytes seen (A)     Gram Stain (4+) Abundant Gram positive  cocci (A)            History reviewed. No pertinent past medical history.     Past Surgical History:   Procedure Laterality Date    CT AORTA AND BILATERAL ILIOFEMORAL RUNOFF ANGIOGRAM W AND/OR WO IV CONTRAST  7/19/2023    CT AORTA AND BILATERAL ILIOFEMORAL RUNOFF ANGIOGRAM W AND/OR WO IV CONTRAST 7/19/2023 GEN CT        No family history on file.     Social History     Socioeconomic History    Marital status: Single     Spouse name: Not on file    Number of children: Not on file    Years of education: Not on file    Highest education level: Not on file   Occupational History    Not on file   Tobacco Use    Smoking status: Every Day     Types: Cigarettes, Pipe    Smokeless tobacco: Never   Vaping Use    Vaping status: Never Used   Substance and Sexual Activity    Alcohol use: Yes     Comment: occasional    Drug use: Never    Sexual activity: Defer   Other Topics Concern    Not on file   Social History Narrative    Not on file     Social Determinants of Health     Financial Resource Strain: Not on file   Food Insecurity: Not on file   Transportation Needs: Not on file   Physical Activity: Not on file   Stress: Not on file   Social Connections: Not on file   Intimate Partner Violence: Not on file   Housing Stability: Not on file        No Known Allergies      Review of Systems  Gen: No fatigue, anorexia, insomnia, fever.   Eyes: No vision loss, double vision, drainage, eye pain.   ENT: No pharyngitis, dry mouth, no hearing changes or ear discharge  Cardiac: No chest pain, palpitations, syncope, near syncope.   Pulmonary: No shortness of breath, cough, hemoptysis.   Heme/lymph: No swollen glands, fever, bleeding.   GI: No abdominal pain, change in bowel habits, melena, hematemesis, hematochezia, nausea, vomiting, diarrhea.   : No discharge, dysuria, frequency, urgency, hematuria.  Endo: No polyuria or weight loss.   Musculoskeletal: Negative for any pain or loss of ROM/weakness  Skin: No rashes or lesions  Neuro:  Normal speech, no numbness or weakness. No gait difficulties  Review of systems is otherwise negative unless stated above or in history of present illness.    Physical Exam:  Constitutional:  no distress, alert and cooperative  Eyes: clear sclera  Head/Neck: No apparent injury, trachea midline  Respiratory/Thorax: Patent airways, thorax symmetric, breathing comfortably  Cardiovascular: no pitting edema  Gastrointestinal: Nondistended  Musculoskeletal: ROM intact  Extremities: no clubbing  Neurological: alert, quintero x4  Psychological: Appropriate affect    Results for orders placed or performed during the hospital encounter of 06/25/24 (from the past 24 hour(s))   Type And Screen   Result Value Ref Range    ABO TYPE A     Rh TYPE POS     ANTIBODY SCREEN NEG    Tissue/Wound Culture/Smear    Specimen: NAIL BIOPSY; Swab   Result Value Ref Range    Gram Stain No polymorphonuclear leukocytes seen (A)     Gram Stain (4+) Abundant Gram positive cocci (A)    Tissue/Wound Culture/Smear    Specimen: NAIL BIOPSY; Swab   Result Value Ref Range    Gram Stain No polymorphonuclear leukocytes seen (A)     Gram Stain (4+) Abundant Gram positive cocci (A)       Mark Dao is a 39 y.o. year old male patient who presents for LEFT partial tib/fib bone excision with Dr. Reynoso on 6/25/24. Acute Pain consulted for block for postoperative pain control.     Plan:    - LEFT femoral single shot blocks performed post-operatively on 6/25/24  - Acute pain service will sign off  - Rest of pain management per primary team    Acute Pain Resident  pg [phone] ph [phone]

## 2024-06-26 NOTE — PROGRESS NOTES
Vancomycin Dosing by Pharmacy- FOLLOW UP    Mark Dao is a 39 y.o. year old male who Pharmacy has been consulted for vancomycin dosing for other surgical infection . Based on the patient's indication and renal status this patient is being dosed based on a goal AUC of 400-600.     Renal function is currently stable.    Current vancomycin dose: 1000 mg given every 12 hours    Estimated vancomycin AUC on current dose: 448 mg/L.hr     Visit Vitals  /80 (BP Location: Right arm, Patient Position: Lying)   Pulse 76   Temp 36.4 °C (97.5 °F) (Temporal)   Resp 16        Lab Results   Component Value Date    CREATININE 1.12 2024    CREATININE 1.15 2024    CREATININE 1.09 2023    CREATININE 1.00 10/31/2018        Patient weight is as follows:   Vitals:    24 0632   Weight: 100 kg (220 lb 7.4 oz)       Cultures:  No results found for the encounter in last 14 days.       I/O last 3 completed shifts:  In: 900 (9 mL/kg) [I.V.:900 (9 mL/kg)]  Out: 250 (2.5 mL/kg) [Blood:250]  Weight: 100 kg   I/O during current shift:  No intake/output data recorded.    Temp (24hrs), Av.4 °C (97.6 °F), Min:36.2 °C (97.2 °F), Max:36.7 °C (98.1 °F)      Assessment/Plan    Within goal AUC range. Continue current vancomycin regimen.    This dosing regimen is predicted by [employer] to result in the following pharmacokinetic parameters:    Regimen: 1000 mg IV every 12 hours.  Start time: 21:40 on 2024  Exposure target: AUC24 (range)400-600 mg/L.hr   AUC24,ss: 448 mg/L.hr  Probability of AUC24 > 400: 70 %  Ctrough,ss: 14 mg/L  Probability of Ctrough,ss > 20: 11 %  Probability of nephrotoxicity (Lodise CLARE ): 9 %    The next level will be obtained on 6/29 AM labs. May be obtained sooner if clinically indicated.   Will continue to monitor renal function daily while on vancomycin and order serum creatinine at least every 48 hours if not already ordered.  Follow for continued vancomycin needs, clinical  response, and signs/symptoms of toxicity.     Leisa Sevilla, [facility]

## 2024-06-27 ENCOUNTER — HOME HEALTH ADMISSION (OUTPATIENT)
Dept: HOME HEALTH SERVICES | Facility: HOME HEALTH | Age: 39
End: 2024-06-27
Payer: MEDICAID

## 2024-06-27 LAB
ANION GAP SERPL CALC-SCNC: 15 MMOL/L (ref 10–20)
BUN SERPL-MCNC: 14 MG/DL (ref 6–23)
CALCIUM SERPL-MCNC: 8.6 MG/DL (ref 8.6–10.6)
CHLORIDE SERPL-SCNC: 108 MMOL/L (ref 98–107)
CO2 SERPL-SCNC: 21 MMOL/L (ref 21–32)
CREAT SERPL-MCNC: 1.17 MG/DL (ref 0.5–1.3)
EGFRCR SERPLBLD CKD-EPI 2021: 82 ML/MIN/1.73M*2
ERYTHROCYTE [DISTWIDTH] IN BLOOD BY AUTOMATED COUNT: 15.7 % (ref 11.5–14.5)
GLUCOSE SERPL-MCNC: 106 MG/DL (ref 74–99)
HCT VFR BLD AUTO: 40.3 % (ref 41–52)
HGB BLD-MCNC: 13 G/DL (ref 13.5–17.5)
MCH RBC QN AUTO: 26.9 PG (ref 26–34)
MCHC RBC AUTO-ENTMCNC: 32.3 G/DL (ref 32–36)
MCV RBC AUTO: 83 FL (ref 80–100)
NRBC BLD-RTO: 0 /100 WBCS (ref 0–0)
PLATELET # BLD AUTO: 225 X10*3/UL (ref 150–450)
POTASSIUM SERPL-SCNC: 4.2 MMOL/L (ref 3.5–5.3)
RBC # BLD AUTO: 4.84 X10*6/UL (ref 4.5–5.9)
SODIUM SERPL-SCNC: 140 MMOL/L (ref 136–145)
WBC # BLD AUTO: 12.1 X10*3/UL (ref 4.4–11.3)

## 2024-06-27 PROCEDURE — 99232 SBSQ HOSP IP/OBS MODERATE 35: CPT | Performed by: INTERNAL MEDICINE

## 2024-06-27 PROCEDURE — 02HV33Z INSERTION OF INFUSION DEVICE INTO SUPERIOR VENA CAVA, PERCUTANEOUS APPROACH: ICD-10-PCS | Performed by: OBSTETRICS & GYNECOLOGY

## 2024-06-27 PROCEDURE — RXMED WILLOW AMBULATORY MEDICATION CHARGE

## 2024-06-27 PROCEDURE — 85027 COMPLETE CBC AUTOMATED: CPT | Performed by: STUDENT IN AN ORGANIZED HEALTH CARE EDUCATION/TRAINING PROGRAM

## 2024-06-27 PROCEDURE — 36415 COLL VENOUS BLD VENIPUNCTURE: CPT | Performed by: STUDENT IN AN ORGANIZED HEALTH CARE EDUCATION/TRAINING PROGRAM

## 2024-06-27 PROCEDURE — 80048 BASIC METABOLIC PNL TOTAL CA: CPT | Performed by: STUDENT IN AN ORGANIZED HEALTH CARE EDUCATION/TRAINING PROGRAM

## 2024-06-27 PROCEDURE — 2500000001 HC RX 250 WO HCPCS SELF ADMINISTERED DRUGS (ALT 637 FOR MEDICARE OP): Mod: SE | Performed by: STUDENT IN AN ORGANIZED HEALTH CARE EDUCATION/TRAINING PROGRAM

## 2024-06-27 PROCEDURE — 1100000001 HC PRIVATE ROOM DAILY

## 2024-06-27 PROCEDURE — 2500000004 HC RX 250 GENERAL PHARMACY W/ HCPCS (ALT 636 FOR OP/ED): Mod: SE

## 2024-06-27 PROCEDURE — 7100000011 HC EXTENDED STAY RECOVERY HOURLY - NURSING UNIT

## 2024-06-27 PROCEDURE — 2500000001 HC RX 250 WO HCPCS SELF ADMINISTERED DRUGS (ALT 637 FOR MEDICARE OP): Mod: SE

## 2024-06-27 RX ORDER — CEFAZOLIN SODIUM 2 G/50ML
2 SOLUTION INTRAVENOUS EVERY 8 HOURS
Qty: 6000 ML | Refills: 0 | Status: SHIPPED
Start: 2024-06-27 | End: 2024-08-06

## 2024-06-27 ASSESSMENT — COGNITIVE AND FUNCTIONAL STATUS - GENERAL
MOBILITY SCORE: 20
STANDING UP FROM CHAIR USING ARMS: A LITTLE
CLIMB 3 TO 5 STEPS WITH RAILING: A LITTLE
WALKING IN HOSPITAL ROOM: A LITTLE
DAILY ACTIVITIY SCORE: 24
MOVING TO AND FROM BED TO CHAIR: A LITTLE

## 2024-06-27 ASSESSMENT — PAIN - FUNCTIONAL ASSESSMENT
PAIN_FUNCTIONAL_ASSESSMENT: 0-10

## 2024-06-27 ASSESSMENT — PAIN SCALES - GENERAL
PAINLEVEL_OUTOF10: 8
PAINLEVEL_OUTOF10: 5 - MODERATE PAIN
PAINLEVEL_OUTOF10: 6
PAINLEVEL_OUTOF10: 3

## 2024-06-27 ASSESSMENT — PAIN DESCRIPTION - DESCRIPTORS
DESCRIPTORS: THROBBING;SHOOTING
DESCRIPTORS: ACHING

## 2024-06-27 NOTE — POST-PROCEDURE NOTE
Pre-Procedure Checklist:  Emergent Line Insertion: No  Type of Line to be Placed: PICC  Consent Obtained: Yes  Emergency Medication Necessary: No  Patient Identified with 2 Independent Identifiers: Yes  Review of Allergies, Anticoagulation, Relevant Labs, ECG/Telemetry: Yes  Risks/Benefits/Alternatives Discussed with Patient/POA/Legal Representative: Yes  Stop Sign on Door: Yes  Time Out Performed: Yes  Catheter Exchange: No    Positioning Checklist:  All People, Including Patient, in the Room with Cap and Mask: Yes  Fluoroscopy Used to Identify Vessel and Guide Insertion: No   Sterile Cover Used: Yes  Full Barrier Precautions Followed (Mask, Cap, Gown, Gloves): Yes  Hands Washed: Yes  Monitors Attached with Sound Alarms On: No  Full Body Sterile Drape (Head-to-Toe) Used to Cover Patient: Yes  Trendelenburg Position (For IJ and Subclavian): No  CHG Skin Prep Used and Allowed to Air Dry to Skin Procedure: Yes    Procedure Checklist:  Blood Aspirated From All Lumens, All Ports Subsequently Flushed: Yes  Catheter Caps Placed on All Lumens; Lumens Clamped: Yes  Maintain Guidewire Control Throughout, Ensuring Guidewire Removal: Yes  Maintain Sterile Field Throughout Insertion: Yes  Catheter Secured: Yes  Confirmatory Test of Venous Placement: Non-Pulsatile Blood    Post Procedure Checklist:  Date and Time Written on Dressing: Yes  Sharp and Wire Count and Safe Disposal of all Sharps/Wires: Yes  Sterile Dressing Applied Per Protocol: Yes  X-ray Ordered or ECG Image: Yes    PICC Insertion Details:  Size (Fr): 4  Lumen Type: single  Catheter to Vein Ratio Less Than 50%: Yes  Total Length (cm): 50  External Length (cm): 0  Orientation: left  Location: basilic  Site Prep: Chlorohexidine; Usual sterile procedure followed  Local Anesthetic: Injectable/Subcutaneous  Indication:IV abx  Insertion Team Members in the Room: Nurse, LPN  Initial Extremity Circumference (cm): 30  Insertion Attempts: 1  Patient Tolerance: Tolerated  Well, Age Appropriate  Comfort Measures: Subcutaneous anesthetic; Verbal  Procedure Location: Bedside  Safety Measures: Patient specific safety measures addressed with RN  Estimated Blood Loss (mL): 1  Vessel Fully Compressible Proximally and Distally to Insertion Site: Yes  Brisk Blood Return Obtained and Line Draws Easily: Yes  Tip Location: SVC  Line Confirmation: ECG  Lot #: [id number]  : Bard  PICC Line Exp Date:9/30/2025  Securement: Stat Lock  Post Procedure Checklist: Handoff with RN; Obtain all new IV tubing prior to use; Bed at lowest level and wheels locked; Line discharge information at bedside.  Additional Details: Line was inserted using Modified Seldinger's Technique.   Placed by: Yelena Morales RN-[facility], [place]

## 2024-06-27 NOTE — PROGRESS NOTES
"Mark Dao is a 39 y.o. male on day 1 of admission presenting with Closed left ankle fracture, sequela.    Orthopaedic Surgery Progress Note    S:  No acute events overnight. Has been able to get up and walk with walker. No chest pain, shortness of breath, fevers.      O:  /63   Pulse 79   Temp 36.9 °C (98.4 °F)   Resp 16   Ht 1.93 m (6' 4\")   Wt 100 kg (220 lb 7.4 oz)   SpO2 98%   BMI 26.84 kg/m²     Constitutional: NAD  HEENT: hearing and vision grossly intact, MMM  Resp: breathing comfortably   CV: extremities warm, well perfused  Neuro: alert, sensory and motor grossly intact  Psych: Appropriate mood and affect      MSK:  LLE:   - Dressing in place, c/d/i  -Tender at site of injury   -Motor intact in EHL/FHL/DF/PF  -SILT in saph/sural/SPN/DPN distributions  -Foot warm, well perfused  - Palpable DP/PT pulse, brisk cap refill  -Compartments soft and compressible      A/P: 39 y.o. male w a L tibial shaft fx w retained nail and chronic infection s/p removal of hardware, ream, irrigation and aspiration with injection of antibiotic cement on 6/25/2024 with Dr. Reynoso.  Intra-op findings with gross purulence and draining sinus tract.      Plan:  - Weight bearing: weightbearing as tolerated  - DVT ppx: SCDs, aspirin 81mg twice a day x 6 weeks  - OR Cx: Group B strep  - ID consult, appreciate recs: ancef x 6 weeks  - will require PICC, order placed  - Diet: Regular   - Pain: Multimodal  - FEN: HLIV with good PO intake  - Bowel Regimen: Colace, senna, dulcolax  - PT/OT  - Pulm: Encourage IS  - Continue home medications      Dispo: pending PICC    Jovani Robert MD   Orthopaedic Surgery  Orthopaedic Trauma Team    This patient will be followed by the Orthopaedic Trauma service. Please page or Epic Chat the corresponding residents below with questions or concerns.      Ortho Trauma Service (Epic Chat Preferred)  First call: Jovani Robert, PGY1  Second call: Javy Duffy PGY2  Third call: Sherley Sidhu, " PGY3    Please page [phone] on weekends or from 6PM - 6AM for any additional questions or concerns.

## 2024-06-27 NOTE — PROGRESS NOTES
Mark Dao is a 39 y.o. male on day 1 of admission presenting with Closed left ankle fracture, sequela.    Subjective   Interval History: Patient just had PICC line placed. Leg pain is moderate. No new concerns.        Objective   Range of Vitals (last 24 hours)  Heart Rate:  [66-79]   Temp:  [36.2 °C (97.2 °F)-36.9 °C (98.4 °F)]   Resp:  [8-16]   BP: ()/(57-66)   SpO2:  [95 %-98 %]   Daily Weight  06/25/24 : 100 kg (220 lb 7.4 oz)    Body mass index is 26.84 kg/m².    Physical Exam  General: Awake, alert, no acute distress, lying in bed with left leg on several pillows  Respiratory: Normal respiratory effort on room air  Genitourinary: No colon  Extremities: LLE bandaged from foot to thigh, toes warm, well-perfused     Antibiotics  Patient recently received an antibiotic (last 12 hours)       Date/Time Action Medication Dose    06/27/24 1602 New Bag    ceFAZolin in dextrose (iso-os) (Ancef) IVPB 2 g 2 g    06/27/24 0854 New Bag    ceFAZolin in dextrose (iso-os) (Ancef) IVPB 2 g 2 g            Relevant Results  Labs  Results from last 72 hours   Lab Units 06/27/24  0642 06/26/24  0733   WBC AUTO x10*3/uL 12.1* 19.5*   HEMOGLOBIN g/dL 13.0* 14.3   HEMATOCRIT % 40.3* 44.7   PLATELETS AUTO x10*3/uL 225 275     Results from last 72 hours   Lab Units 06/27/24  0642 06/26/24  0733   SODIUM mmol/L 140 140   POTASSIUM mmol/L 4.2 4.1   CHLORIDE mmol/L 108* 106   CO2 mmol/L 21 22   BUN mg/dL 14 16   CREATININE mg/dL 1.17 1.12   GLUCOSE mg/dL 106* 67*   CALCIUM mg/dL 8.6 9.2   ANION GAP mmol/L 15 16   EGFR mL/min/1.73m*2 82 86         Estimated Creatinine Clearance: 105.1 mL/min (by C-G formula based on SCr of 1.17 mg/dL).  C-Reactive Protein   Date Value Ref Range Status   06/20/2024 0.70 <1.00 mg/dL Final     CRP   Date Value Ref Range Status   07/19/2023 1.22 (A) mg/dL Final     Comment:     REF VALUE  < 1.00           Microbiology  Susceptibility data from last 14 days.  Collected Specimen Info Organism    06/25/24 Swab from NAIL BIOPSY Staphylococcus aureus     Streptococcus agalactiae (Group B Streptococcus)     Mixed Gram-Positive Bacteria    06/25/24 Swab from NAIL BIOPSY Staphylococcus aureus     Streptococcus agalactiae (Group B Streptococcus)         Imaging  CT of the  left tibia and fibula without intravenous contrast 6/20/2024  IMPRESSION:  1. Findings compatible with loosening of the tibial intramedullary nail, in the distal most cross locking screw, and proximal of the distal to cross lock screws being proud. Furthermore there is loss of the bone stock/lucency around the medially inserted of the proximal cross lock screws and superficial to this region there is confluent increased soft tissue density in the subcutaneous tissues with skin thickening. Although the bone stock loss could be due from prior injury, bone loss related to chronic osteomyelitis if there is an  overlying draining sinus needs to be considered. The confluent soft tissue density may be a combination of confluent cellulitis and scar tissue although cannot exclude abscess or fistula tract within the confluent region of soft tissue density. Overall septic loosening of the hardware needs to be considered. The smooth periosteal thickening of the tibial metadiaphysis may be due to remodeling although a component being related to chronic infection is a differential consideration.  2. Overall central osseous bridging is seen at prior fracturing of  the tibia and fibula.          Assessment/Plan   Impression: 39 y.o. year old male with hardware associated tibial osteomyelitis now s/p removal of hardware and debridement. Will plan for 6 week course of IV antibiotics. Cultures predominantly growing group B strep with rare growth of staph aureus as well. Awaiting susceptibilities to determine if cefazolin can be continued for MSSA or if vancomycin needs to be initiated. Will plan to treat for 6 week course from date of hardware removal on 6/25.      Recommendations:  - Continue cefazolin 2g q8 hours  - Awaiting staph aureus susceptibilities to finalize antibiotic recs  - Anticipate 6 week course of treatment, end date 8/6/24  - ID will arrange for follow-up with Aliza on 7/30/24  - Please send weekly CBC/diff, CMP, CRP (+/- vancomycin trough, will confirm based on final regimen) and fax results to [phone] vikram Abrams         Thank you for the consult. Please contact Aliza via [employer] chat for further questions. Will continue to follow.

## 2024-06-27 NOTE — Clinical Note
TCC admission assessment completed. Explained role of TCC - verbalized understanding.      Demographics/Insurance: Address, phone number, and insurance verified in chart.  Living Environment:  Primary Support Person:   PCP:  Does not have PCP   Recent Falls: Denies  Assistive Devices/DME: Needs walker  Home Oxygen: none  Dialysis: n/a   Home Care Agency: Plan to go home with [facility]   Transportation at Discharge: family   Pharmacy: Does not have pharmacy   Concerns about discharge:  none

## 2024-06-27 NOTE — CARE PLAN
The patient's goals for the shift include      The clinical goals for the shift include pt. will remain free from injury.

## 2024-06-28 ENCOUNTER — HOME INFUSION (OUTPATIENT)
Dept: INFUSION THERAPY | Age: 39
End: 2024-06-28
Payer: MEDICAID

## 2024-06-28 ENCOUNTER — DOCUMENTATION (OUTPATIENT)
Dept: HOME HEALTH SERVICES | Facility: HOME HEALTH | Age: 39
End: 2024-06-28
Payer: MEDICAID

## 2024-06-28 LAB
BACTERIA SPEC CULT: ABNORMAL
FUNGUS SPEC CULT: NORMAL
FUNGUS SPEC CULT: NORMAL
FUNGUS SPEC FUNGUS STN: NORMAL
FUNGUS SPEC FUNGUS STN: NORMAL
GRAM STN SPEC: ABNORMAL
STAPHYLOCOCCUS SPEC CULT: ABNORMAL

## 2024-06-28 PROCEDURE — 2500000004 HC RX 250 GENERAL PHARMACY W/ HCPCS (ALT 636 FOR OP/ED): Mod: JZ

## 2024-06-28 PROCEDURE — 1100000001 HC PRIVATE ROOM DAILY

## 2024-06-28 PROCEDURE — 2500000001 HC RX 250 WO HCPCS SELF ADMINISTERED DRUGS (ALT 637 FOR MEDICARE OP): Mod: SE

## 2024-06-28 PROCEDURE — 2500000002 HC RX 250 W HCPCS SELF ADMINISTERED DRUGS (ALT 637 FOR MEDICARE OP, ALT 636 FOR OP/ED): Mod: SE

## 2024-06-28 PROCEDURE — 2500000001 HC RX 250 WO HCPCS SELF ADMINISTERED DRUGS (ALT 637 FOR MEDICARE OP): Performed by: STUDENT IN AN ORGANIZED HEALTH CARE EDUCATION/TRAINING PROGRAM

## 2024-06-28 ASSESSMENT — PAIN SCALES - GENERAL
PAINLEVEL_OUTOF10: 8
PAINLEVEL_OUTOF10: 8
PAINLEVEL_OUTOF10: 7
PAINLEVEL_OUTOF10: 6
PAINLEVEL_OUTOF10: 8
PAINLEVEL_OUTOF10: 7
PAINLEVEL_OUTOF10: 6
PAINLEVEL_OUTOF10: 4

## 2024-06-28 ASSESSMENT — PAIN - FUNCTIONAL ASSESSMENT
PAIN_FUNCTIONAL_ASSESSMENT: UNABLE TO SELF-REPORT
PAIN_FUNCTIONAL_ASSESSMENT: 0-10
PAIN_FUNCTIONAL_ASSESSMENT: UNABLE TO SELF-REPORT
PAIN_FUNCTIONAL_ASSESSMENT: 0-10
PAIN_FUNCTIONAL_ASSESSMENT: 0-10

## 2024-06-28 ASSESSMENT — COGNITIVE AND FUNCTIONAL STATUS - GENERAL
WALKING IN HOSPITAL ROOM: A LITTLE
MOBILITY SCORE: 22
CLIMB 3 TO 5 STEPS WITH RAILING: A LITTLE

## 2024-06-28 ASSESSMENT — ACTIVITIES OF DAILY LIVING (ADL): LACK_OF_TRANSPORTATION: NO

## 2024-06-28 ASSESSMENT — PAIN DESCRIPTION - LOCATION: LOCATION: LEG

## 2024-06-28 ASSESSMENT — PAIN DESCRIPTION - ORIENTATION: ORIENTATION: LEFT

## 2024-06-28 NOTE — PROGRESS NOTES
"Mark Dao is a 39 y.o. male on day 1 of admission presenting with Closed left ankle fracture, sequela.    Orthopaedic Surgery Progress Note    S:  No acute events overnight. Having most of the pain around the knee. No chest pain, shortness of breath, fevers.      O:  BP 99/60 (Patient Position: Lying)   Pulse 66   Temp 36.6 °C (97.9 °F) (Temporal)   Resp 17   Ht 1.93 m (6' 4\")   Wt 100 kg (220 lb 7.4 oz)   SpO2 94%   BMI 26.84 kg/m²     Constitutional: NAD  HEENT: hearing and vision grossly intact, MMM  Resp: breathing comfortably   CV: extremities warm, well perfused  Neuro: alert, sensory and motor grossly intact  Psych: Appropriate mood and affect      MSK:  LLE:   - Dressing in place, c/d/i  -Tender at site of injury   -Motor intact in EHL/FHL/DF/PF  -SILT in saph/sural/SPN/DPN distributions  -Foot warm, well perfused  - Palpable DP/PT pulse, brisk cap refill  -Compartments soft and compressible      A/P: 39 y.o. male w a L tibial shaft fx w retained nail and chronic infection s/p removal of hardware, ream, irrigation and aspiration with injection of antibiotic cement on 6/25/2024 with Dr. Reynoso.  Intra-op findings with gross purulence and draining sinus tract.      Plan:  - Weight bearing: weightbearing as tolerated  - DVT ppx: SCDs, aspirin 81mg twice a day x 6 weeks  - OR Cx: Group B strep  - ID consult, appreciate recs: ancef x 6 weeks; cx grew staph yesterday so will keep in house until sensitivities return  - PICC placed  - Diet: Regular   - Pain: Multimodal  - FEN: HLIV with good PO intake  - Bowel Regimen: Colace, senna, dulcolax  - PT/OT  - Pulm: Encourage IS  - Continue home medications      Dispo: pending sensitivities     Jovani Robert MD   Orthopaedic Surgery  Orthopaedic Trauma Team    This patient will be followed by the Orthopaedic Trauma service. Please page or Epic Chat the corresponding residents below with questions or concerns.      Ortho Trauma Service (Epic Chat " Preferred)  First call: Jovani Robert, PGY1  Second call: Javy Duffy, PGY2  Third call: Sherley Sidhu, PGY3    Please page [phone] on weekends or from 6PM - 6AM for any additional questions or concerns.

## 2024-06-28 NOTE — TREATMENT PLAN
Confirmed staph aureus isolate is methicillin-susceptible, thus cefazolin will adequately treat both the staph and strep isolates. Final ID recommendations as follow:    - Continue cefazolin 2g q8 hours  - Anticipate 6 week course of treatment, end date 8/6/24  - ID will arrange for follow-up with Aliza on 7/30/24  - Please send weekly CBC/diff, CMP, CRP and fax results to [phone] attention Aliza    Please contact via Epic chat for further questions.

## 2024-06-28 NOTE — PROGRESS NOTES
DIAGNOSIS L tibia/fibula OM  No Known Allergies   REVIEW OF LABS AT DISCHARGE  LINE INFO: PT HAS A SL PICC TO BE MANAGED PER [facility] PROTOCOL  PT ORDERED cefazolin 2 gm 28 thru 8/6  LABS ORDERED INCLUDE CBC/diff, CMP, CRP  SYSTEM IV push  CARE PLAN DONE    Mark Dao IS A 39 y.o. male ORDERED cefazolin  FOLLOWED BY Dr Abrams    [facility] spoke with pt, informed him of medication name, delivery, dosing, etc. He verbalized understanding of instruction regarding medication and receipt/storage of package. Verified delivery address as [address], [facility] [address].  to call on way, delivery ok by 9 pm.    RX DISPENSED THE FOLLOWING WITH SUPPLIES TO MATCH WITH DELIVERY 6/28  15x cefazolin syringes  DOS 6/29-7/3    FOLLOW UP 7/3 PROGRESS, LABS, DELIVERY STRAIGHT

## 2024-06-28 NOTE — HH CARE COORDINATION
Home Care received a Referral for Infusion, Nursing, Physical Therapy, and Occupational Therapy. We have processed the referral for a Start of Care on 6/29 AM.     If you have any questions or concerns, please feel free to contact us at [phone]. Follow the prompts, enter your five digit zip code, and you will be directed to your care team on [facility] 2.

## 2024-06-28 NOTE — CARE PLAN
Problem: Pain  Goal: Takes deep breaths with improved pain control throughout the shift  Outcome: Progressing  Goal: Turns in bed with improved pain control throughout the shift  Outcome: Progressing  Goal: Walks with improved pain control throughout the shift  Outcome: Progressing  Goal: Performs ADL's with improved pain control throughout shift  Outcome: Progressing  Goal: Participates in PT with improved pain control throughout the shift  Outcome: Progressing  Goal: Free from opioid side effects throughout the shift  Outcome: Met  Goal: Free from acute confusion related to pain meds throughout the shift  Outcome: Met     Problem: Fall/Injury  Goal: Not fall by end of shift  Outcome: Met  Goal: Be free from injury by end of the shift  Outcome: Met  Goal: Verbalize understanding of personal risk factors for fall in the hospital  Outcome: Met  Goal: Verbalize understanding of risk factor reduction measures to prevent injury from fall in the home  Outcome: Met  Goal: Use assistive devices by end of the shift  Outcome: Met  Goal: Pace activities to prevent fatigue by end of the shift  Outcome: Met     Problem: Pain - Adult  Goal: Verbalizes/displays adequate comfort level or baseline comfort level  Outcome: Met     Problem: Safety - Adult  Goal: Free from fall injury  Outcome: Met     Problem: Discharge Planning  Goal: Discharge to home or other facility with appropriate resources  Outcome: Progressing     Problem: Chronic Conditions and Co-morbidities  Goal: Patient's chronic conditions and co-morbidity symptoms are monitored and maintained or improved  Outcome: Met   The patient's goals for the shift include      The clinical goals for the shift include Mark will remain neurologically intact throughout the shift.

## 2024-06-29 ENCOUNTER — HOME CARE VISIT (OUTPATIENT)
Dept: HOME HEALTH SERVICES | Facility: HOME HEALTH | Age: 39
End: 2024-06-29
Payer: MEDICAID

## 2024-06-29 ENCOUNTER — PHARMACY VISIT (OUTPATIENT)
Dept: PHARMACY | Facility: CLINIC | Age: 39
End: 2024-06-29
Payer: MEDICAID

## 2024-06-29 ENCOUNTER — HOME CARE VISIT (OUTPATIENT)
Dept: HOME HEALTH SERVICES | Facility: HOME HEALTH | Age: 39
End: 2024-06-29

## 2024-06-29 VITALS
HEART RATE: 70 BPM | OXYGEN SATURATION: 92 % | DIASTOLIC BLOOD PRESSURE: 63 MMHG | RESPIRATION RATE: 14 BRPM | WEIGHT: 220.46 LBS | HEIGHT: 76 IN | SYSTOLIC BLOOD PRESSURE: 113 MMHG | TEMPERATURE: 98.2 F | BODY MASS INDEX: 26.85 KG/M2

## 2024-06-29 VITALS
BODY MASS INDEX: 26.85 KG/M2 | TEMPERATURE: 98.9 F | HEIGHT: 76 IN | WEIGHT: 220.46 LBS | OXYGEN SATURATION: 96 % | HEART RATE: 80 BPM

## 2024-06-29 DIAGNOSIS — M86.662: Primary | ICD-10-CM

## 2024-06-29 LAB — VANCOMYCIN SERPL-MCNC: <2 UG/ML (ref 5–20)

## 2024-06-29 PROCEDURE — 80202 ASSAY OF VANCOMYCIN: CPT | Performed by: STUDENT IN AN ORGANIZED HEALTH CARE EDUCATION/TRAINING PROGRAM

## 2024-06-29 PROCEDURE — 2500000001 HC RX 250 WO HCPCS SELF ADMINISTERED DRUGS (ALT 637 FOR MEDICARE OP)

## 2024-06-29 PROCEDURE — 2500000004 HC RX 250 GENERAL PHARMACY W/ HCPCS (ALT 636 FOR OP/ED): Mod: JZ

## 2024-06-29 PROCEDURE — RXMED WILLOW AMBULATORY MEDICATION CHARGE

## 2024-06-29 PROCEDURE — 2500000001 HC RX 250 WO HCPCS SELF ADMINISTERED DRUGS (ALT 637 FOR MEDICARE OP): Performed by: STUDENT IN AN ORGANIZED HEALTH CARE EDUCATION/TRAINING PROGRAM

## 2024-06-29 PROCEDURE — 2500000002 HC RX 250 W HCPCS SELF ADMINISTERED DRUGS (ALT 637 FOR MEDICARE OP, ALT 636 FOR OP/ED)

## 2024-06-29 PROCEDURE — G0299 HHS/HOSPICE OF RN EA 15 MIN: HCPCS

## 2024-06-29 RX ORDER — PANTOPRAZOLE SODIUM 40 MG/1
40 TABLET, DELAYED RELEASE ORAL
Qty: 28 TABLET | Refills: 0 | Status: SHIPPED | OUTPATIENT
Start: 2024-06-29 | End: 2024-07-27

## 2024-06-29 RX ORDER — AMOXICILLIN AND CLAVULANATE POTASSIUM 875; 125 MG/1; MG/1
1 TABLET, FILM COATED ORAL 2 TIMES DAILY
Qty: 10 TABLET | Refills: 0 | Status: SHIPPED | OUTPATIENT
Start: 2024-06-29 | End: 2024-07-04

## 2024-06-29 RX ORDER — MORPHINE SULFATE 15 MG/1
7.5 TABLET ORAL EVERY 4 HOURS PRN
Qty: 15 TABLET | Refills: 0 | Status: SHIPPED | OUTPATIENT
Start: 2024-06-29

## 2024-06-29 ASSESSMENT — PAIN - FUNCTIONAL ASSESSMENT
PAIN_FUNCTIONAL_ASSESSMENT: 0-10

## 2024-06-29 ASSESSMENT — PAIN SCALES - GENERAL
PAINLEVEL_OUTOF10: 7
PAINLEVEL_OUTOF10: 5 - MODERATE PAIN
PAINLEVEL_OUTOF10: 6
PAINLEVEL_OUTOF10: 8
PAINLEVEL_OUTOF10: 9

## 2024-06-29 ASSESSMENT — ACTIVITIES OF DAILY LIVING (ADL): ENTERING_EXITING_HOME: MODERATE ASSIST

## 2024-06-29 NOTE — PROGRESS NOTES
"Mark Dao is a 39 y.o. male on day 2 of admission presenting with Closed left ankle fracture, sequela.    Orthopaedic Surgery Progress Note    S:  No acute events overnight. Having most of the pain around the knee. No chest pain, shortness of breath, fevers.      O:  /63 (BP Location: Right arm, Patient Position: Lying)   Pulse 70   Temp 36.8 °C (98.2 °F) (Temporal)   Resp 14   Ht 1.93 m (6' 4\")   Wt 100 kg (220 lb 7.4 oz)   SpO2 92%   BMI 26.84 kg/m²     Constitutional: NAD  HEENT: hearing and vision grossly intact, MMM  Resp: breathing comfortably   CV: extremities warm, well perfused  Neuro: alert, sensory and motor grossly intact  Psych: Appropriate mood and affect      MSK:  LLE:   - Dressing in place, c/d/i  -Tender at site of injury   -Motor intact in EHL/FHL/DF/PF  -SILT in saph/sural/SPN/DPN distributions  -Foot warm, well perfused  - Palpable DP/PT pulse, brisk cap refill  -Compartments soft and compressible      A/P: 39 y.o. male w a L tibial shaft fx w retained nail and chronic infection s/p removal of hardware, ream, irrigation and aspiration with injection of antibiotic cement on 6/25/2024 with Dr. Reynoso.  Intra-op findings with gross purulence and draining sinus tract.      Plan:  - Weight bearing: weightbearing as tolerated  - DVT ppx: SCDs, aspirin 81mg twice a day x 6 weeks  - OR Cx: Group B strep  - ID consult, appreciate recs: ancef x 6 weeks; cx grew MSSA  - PICC placed  - Diet: Regular   - Pain: Multimodal  - FEN: HLIV with good PO intake  - Bowel Regimen: Colace, senna, dulcolax  - PT/OT  - Pulm: Encourage IS  - Continue home medications      Dispo: Home today    Javy Duffy MD   Orthopaedic Surgery  Orthopaedic Trauma Team    This patient will be followed by the Orthopaedic Trauma service. Please page or Epic Chat the corresponding residents below with questions or concerns.      Ortho Trauma Service (Epic Chat Preferred)  First call: Jovani Robert, PGY1  Second call: " Javy Duffy, PGY2  Third call: Sherley Sidhu, PGY3    Please page [phone] on weekends or from 6PM - 6AM for any additional questions or concerns.

## 2024-06-29 NOTE — CASE COMMUNICATION
Start of care /IV  was set up for 8 am Saturday. I was advised by my office that patient would be discharged Friday evening and would be home to start dose at 8 am Saturday.  Confirmed with our office, and pharmacy confirmed that medication was being delivered by 9pm.  I spoke with the patient the evening prior and he is aware that I will be coming over in the morning.    I arrived to the patient's home and he is not home. Spoke with jelena castro who is still in [facility], not discharged yet. Nurse is giving 8 am dose and patient is planning to DC after that, to be home for 4pm SOC.  That will be with a different nurse. I already informed her and our office to assign patient.    Unsure what the delay was in keeping patient from being discharged as planned,  and why I was not contacted prior to driving to the patient's home after it was set up and confirmradha carlson

## 2024-06-30 LAB
BACTERIA BLD CULT: NORMAL
BACTERIA BLD CULT: NORMAL

## 2024-06-30 NOTE — CARE PLAN
Patient has met all care plan goals related to pain control and fall precautions.    The clinical goals for the shift include Patient's pain controlled to a tolerable level. Patient compliant with DVT prophylaxis. Patient remains safe and free from falls.

## 2024-06-30 NOTE — NURSING NOTE
Reviewed discharge instructions with the patient. Reviewed correct administration of all medications on medication list and last time medications were administered. Instructed the patient to not drive or operate machinery while taking narcotic pain medication, morphine. Patient verbalized understand to not drive or operate machinery while taking narcotic pain medication, morphine. Reviewed follow up care with the patient. Instructed the patient to call his provider for signs and symptoms of infection including fever, chills, increased pain at incision site, redness around incision, foul smelling yellow or green discharge, bloody discharge from the incision. Patient verbalized understanding of reasons to call his provider. Instructed the patient to leave his post op dressing and ACE wrap in place until his orthopedic follow up appointment and to not shower while the dressing is in place. Patient's mother is at bedside with the patient and will be driving him home. Patient verbalized understanding of all instructions and all questions answered prior to discharge.

## 2024-06-30 NOTE — DISCHARGE SUMMARY
Discharge Diagnosis  Closed left ankle fracture, sequela    Issues Requiring Follow-Up  Routine Postoperative Followup    Test Results Pending At Discharge  Pending Labs       Order Current Status    Blood Culture Preliminary result    Blood Culture Preliminary result    Fungal Culture/Smear Preliminary result    Fungal Culture/Smear Preliminary result            Hospital Course  39 year-old male who presented with left tibial shaft fracture with retained nail and chronic infection. Patient is now s/p removal of hardware, ream, irrigation, aspiration on 6/25 by Dr. Reynoso. On the day of surgery, patient was identified in the pre-operative holding area and agreeable to proceed with surgery. Written consent was obtained.  Please see operative note for further details of this procedure. Patient was started on empiric antibiotics. Patient recovered in the PACU before transfer to a regular nursing floor. Patient was started on oxycodone, tylenol for pain control and ASA 81 mg bid for DVT prophylaxis. Intra-op cultures grew Group B strep and MSSA. PICC line placed. Physical therapy recommended continued recovery at home with continued physical therapy and wound care. On the day of discharge, patient was afebrile with stable vital signs. Patient was neurovascularly intact at time of discharge. Patient was discharged with ancef for home infusion and a prescription of ASA 81 mg bid for DVT prophylaxis for 4 weeks. Patient will follow-up with Dr. Reynoso in 2 weeks for post-operative visit.    Home Medications     Medication List      START taking these medications     acetaminophen 500 mg tablet; Commonly known as: Tylenol; Take 1 tablet   (500 mg) by mouth every 6 hours for 28 days.   aspirin 81 mg EC tablet; Take 1 tablet (81 mg) by mouth 2 times a day   for 28 days.   ceFAZolin 2 gram/50 mL IV; Commonly known as: Ancef; Infuse 50 mL (2 g)   at 100 mL/hr over 30 minutes into a venous catheter every 8 hours.   morphine 15 mg  tablet; Commonly known as: MSIR; Take 0.5 tablets (7.5   mg) by mouth every 4 hours if needed for severe pain (7 - 10).   Oysco 500/D 500 mg-5 mcg (200 unit) tablet; Generic drug: calcium   carbonate-vitamin D3; Take 1 tablet by mouth 2 times a day.   pantoprazole 40 mg EC tablet; Commonly known as: ProtoNix; Take 1 tablet   (40 mg) by mouth once daily in the morning. Take before meals for 28 days.   Do not crush, chew, or split.   polyethylene glycol 17 gram packet; Commonly known as: Glycolax,   Miralax; Take 17 g by mouth once daily.       Outpatient Follow-Up  Future Appointments   Date Time Provider Department Center   7/1/2024 To Be Determined Jun Sparrow OT [facility]   7/2/2024 To Be Determined Myrna Waterman, PT [facility]   7/2/2024 To Be Determined Danuta Dao RN [facility]   7/9/2024 To Be Determined Danuta Dao RN [facility]   7/12/2024 11:00 AM Debbi Ward PA-C [id number]   7/16/2024 To Be Determined Danuta Dao RN [facility]   7/23/2024 To Be Determined Danuta Dao RN [facility]   7/30/2024 To Be Determined Danuta Dao RN [facility]   8/7/2024 To Be Determined Danuta Dao RN [facility]       Javy Duffy MD  PGY-2 Orthopedic Surgery  [facility]  Epic Chat Preferred  Pager: [phone]

## 2024-07-01 ENCOUNTER — TELEPHONE (OUTPATIENT)
Dept: RADIOLOGY | Facility: CLINIC | Age: 39
End: 2024-07-01
Payer: MEDICAID

## 2024-07-01 LAB
FUNGUS SPEC CULT: NORMAL
FUNGUS SPEC CULT: NORMAL
FUNGUS SPEC FUNGUS STN: NORMAL
FUNGUS SPEC FUNGUS STN: NORMAL

## 2024-07-02 ENCOUNTER — HOME CARE VISIT (OUTPATIENT)
Dept: HOME HEALTH SERVICES | Facility: HOME HEALTH | Age: 39
End: 2024-07-02
Payer: MEDICAID

## 2024-07-02 ENCOUNTER — LAB REQUISITION (OUTPATIENT)
Dept: LAB | Facility: HOSPITAL | Age: 39
End: 2024-07-02
Payer: MEDICAID

## 2024-07-02 VITALS
DIASTOLIC BLOOD PRESSURE: 60 MMHG | TEMPERATURE: 97.8 F | HEART RATE: 64 BPM | RESPIRATION RATE: 16 BRPM | SYSTOLIC BLOOD PRESSURE: 100 MMHG | OXYGEN SATURATION: 98 %

## 2024-07-02 DIAGNOSIS — M86.662: ICD-10-CM

## 2024-07-02 LAB
ALBUMIN SERPL BCP-MCNC: 3.3 G/DL (ref 3.4–5)
ALP SERPL-CCNC: 127 U/L (ref 33–120)
ALT SERPL W P-5'-P-CCNC: 41 U/L (ref 10–52)
ANION GAP SERPL CALC-SCNC: 13 MMOL/L (ref 10–20)
AST SERPL W P-5'-P-CCNC: 24 U/L (ref 9–39)
BASOPHILS # BLD AUTO: 0.04 X10*3/UL (ref 0–0.1)
BASOPHILS NFR BLD AUTO: 0.4 %
BILIRUB SERPL-MCNC: 0.4 MG/DL (ref 0–1.2)
BUN SERPL-MCNC: 17 MG/DL (ref 6–23)
CALCIUM SERPL-MCNC: 9.1 MG/DL (ref 8.6–10.3)
CHLORIDE SERPL-SCNC: 102 MMOL/L (ref 98–107)
CO2 SERPL-SCNC: 29 MMOL/L (ref 21–32)
CREAT SERPL-MCNC: 1 MG/DL (ref 0.5–1.3)
CRP SERPL-MCNC: 21.64 MG/DL
EGFRCR SERPLBLD CKD-EPI 2021: >90 ML/MIN/1.73M*2
EOSINOPHIL # BLD AUTO: 0.42 X10*3/UL (ref 0–0.7)
EOSINOPHIL NFR BLD AUTO: 3.8 %
ERYTHROCYTE [DISTWIDTH] IN BLOOD BY AUTOMATED COUNT: 14.6 % (ref 11.5–14.5)
GLUCOSE SERPL-MCNC: 88 MG/DL (ref 74–99)
HCT VFR BLD AUTO: 36.1 % (ref 41–52)
HGB BLD-MCNC: 11.8 G/DL (ref 13.5–17.5)
IMM GRANULOCYTES # BLD AUTO: 0.07 X10*3/UL (ref 0–0.7)
IMM GRANULOCYTES NFR BLD AUTO: 0.6 % (ref 0–0.9)
LYMPHOCYTES # BLD AUTO: 1.61 X10*3/UL (ref 1.2–4.8)
LYMPHOCYTES NFR BLD AUTO: 14.4 %
MCH RBC QN AUTO: 26.6 PG (ref 26–34)
MCHC RBC AUTO-ENTMCNC: 32.7 G/DL (ref 32–36)
MCV RBC AUTO: 81 FL (ref 80–100)
MONOCYTES # BLD AUTO: 1.25 X10*3/UL (ref 0.1–1)
MONOCYTES NFR BLD AUTO: 11.2 %
NEUTROPHILS # BLD AUTO: 7.8 X10*3/UL (ref 1.2–7.7)
NEUTROPHILS NFR BLD AUTO: 69.6 %
NRBC BLD-RTO: 0 /100 WBCS (ref 0–0)
PLATELET # BLD AUTO: 266 X10*3/UL (ref 150–450)
POTASSIUM SERPL-SCNC: 4.3 MMOL/L (ref 3.5–5.3)
PROT SERPL-MCNC: 6.4 G/DL (ref 6.4–8.2)
RBC # BLD AUTO: 4.44 X10*6/UL (ref 4.5–5.9)
SODIUM SERPL-SCNC: 140 MMOL/L (ref 136–145)
WBC # BLD AUTO: 11.2 X10*3/UL (ref 4.4–11.3)

## 2024-07-02 PROCEDURE — 80053 COMPREHEN METABOLIC PANEL: CPT

## 2024-07-02 PROCEDURE — 86140 C-REACTIVE PROTEIN: CPT

## 2024-07-02 PROCEDURE — 85025 COMPLETE CBC W/AUTO DIFF WBC: CPT

## 2024-07-02 PROCEDURE — G0151 HHCP-SERV OF PT,EA 15 MIN: HCPCS

## 2024-07-02 PROCEDURE — G0299 HHS/HOSPICE OF RN EA 15 MIN: HCPCS

## 2024-07-02 ASSESSMENT — PAIN SCALES - PAIN ASSESSMENT IN ADVANCED DEMENTIA (PAINAD)
FACIALEXPRESSION: 0 - SMILING OR INEXPRESSIVE.
BODYLANGUAGE: 0 - RELAXED.
BREATHING: 0
CONSOLABILITY: 0
NEGVOCALIZATION: 0
BODYLANGUAGE: 0
CONSOLABILITY: 0 - NO NEED TO CONSOLE.
FACIALEXPRESSION: 0
TOTALSCORE: 0
NEGVOCALIZATION: 0 - NONE.

## 2024-07-02 ASSESSMENT — ACTIVITIES OF DAILY LIVING (ADL)
AMBULATION_DISTANCE/DURATION_TOLERATED: 30 FEET
AMBULATION ASSISTANCE: STAND BY ASSIST
CURRENT_FUNCTION: STAND BY ASSIST
AMBULATION ASSISTANCE ON FLAT SURFACES: 1
AMBULATION ASSISTANCE: ONE PERSON
CURRENT_FUNCTION: ONE PERSON

## 2024-07-02 ASSESSMENT — ENCOUNTER SYMPTOMS
SUBJECTIVE PAIN PROGRESSION: WAXING AND WANING
HIGHEST PAIN SEVERITY IN PAST 24 HOURS: 9/10
SUBJECTIVE PAIN PROGRESSION: UNCHANGED
PAIN LOCATION - PAIN FREQUENCY: CONSTANT
PAIN: 1
CHANGE IN APPETITE: UNCHANGED
LOWEST PAIN SEVERITY IN PAST 24 HOURS: 5/10
PAIN: 1
PERSON REPORTING PAIN: PATIENT
HIGHEST PAIN SEVERITY IN PAST 24 HOURS: 10/10
PAIN LOCATION - RELIEVING FACTORS: MEDS
PAIN LOCATION: LEFT LEG
LOWEST PAIN SEVERITY IN PAST 24 HOURS: 5/10
PERSON REPORTING PAIN: PATIENT
PAIN LOCATION - PAIN DURATION: ACUTE
PAIN LOCATION - PAIN QUALITY: ACHING
PAIN LOCATION - PAIN SEVERITY: 5/10
AGITATION: 1
APPETITE LEVEL: GOOD
PAIN SEVERITY GOAL: 0/10
PAIN LOCATION - EXACERBATING FACTORS: ACTIVITY

## 2024-07-03 ENCOUNTER — HOME INFUSION (OUTPATIENT)
Dept: INFUSION THERAPY | Age: 39
End: 2024-07-03
Payer: MEDICAID

## 2024-07-03 NOTE — PROGRESS NOTES
Chart review - Mark Dao is a 39 y.o. patient on home care for L tibia/fibula OM.  Med - cefazolin 2 gm q8 thru 8/6. Dr. Abrams following    Labs from 7/2 reviewed- CRP 21.64, H/H low    RN visits reviewed: patient's mother manages antibiotics and administers doses, no issues noted with infusions or line    [facility] call to pt, tolerating infusions well, confirmed doses in home thru 7/3. Agreeable to delivery of another week of medication and supplies.     Pharmacy to mix 7/3 and deliver straight with supplies to match:  24x cefazolin 2 gm syringes  DOS: 7/4 - 7/11    Follow up 7/11 - check labs and progress, deliver straight

## 2024-07-04 ASSESSMENT — ACTIVITIES OF DAILY LIVING (ADL)
CURRENT_FUNCTION: MODERATE ASSIST
TOILETING: ONE PERSON
TRANSPORTATION ASSESSED: 1
TRANSPORTATION: DEPENDENT
PHYSICAL TRANSFERS ASSESSED: 1
AMBULATION ASSISTANCE: 1
DRESSING_UB_CURRENT_FUNCTION: MINIMUM ASSIST
AMBULATION ASSISTANCE: ONE PERSON
OASIS_M1830: 05
TOILETING: 1
DRESSING_LB_CURRENT_FUNCTION: MODERATE ASSIST

## 2024-07-04 ASSESSMENT — ENCOUNTER SYMPTOMS
LIMITED RANGE OF MOTION: 1
APPETITE LEVEL: GOOD
LOSS OF SENSATION IN FEET: 0
STOOL FREQUENCY: LESS THAN DAILY
DEPRESSION: 0
PERSON REPORTING PAIN: PATIENT
PAIN LOCATION - PAIN SEVERITY: 5/10
PAIN LOCATION: LEFT LEG
CHANGE IN APPETITE: UNCHANGED
SUBJECTIVE PAIN PROGRESSION: GRADUALLY IMPROVING
OCCASIONAL FEELINGS OF UNSTEADINESS: 1
LOWEST PAIN SEVERITY IN PAST 24 HOURS: 3/10
PAIN SEVERITY GOAL: 0/10
PAIN: 1
CONSTIPATION: 1
HIGHEST PAIN SEVERITY IN PAST 24 HOURS: 7/10
MUSCLE WEAKNESS: 1
LAST BOWEL MOVEMENT: 67019

## 2024-07-05 ENCOUNTER — HOME CARE VISIT (OUTPATIENT)
Dept: HOME HEALTH SERVICES | Facility: HOME HEALTH | Age: 39
End: 2024-07-05
Payer: MEDICAID

## 2024-07-05 PROCEDURE — G0152 HHCP-SERV OF OT,EA 15 MIN: HCPCS

## 2024-07-05 ASSESSMENT — ACTIVITIES OF DAILY LIVING (ADL)
GROOMING ASSESSED: 1
DRESSING_UB_CURRENT_FUNCTION: INDEPENDENT
BATHING ASSESSED: 1
PREPARING MEALS: NEEDS ASSISTANCE
TOILETING: 1
DRESSING_LB_CURRENT_FUNCTION: INDEPENDENT
CURRENT_FUNCTION: SUPERVISION
GROOMING_CURRENT_FUNCTION: INDEPENDENT
BATHING_CURRENT_FUNCTION: INDEPENDENT
TOILETING: INDEPENDENT
PHYSICAL TRANSFERS ASSESSED: 1

## 2024-07-05 ASSESSMENT — ENCOUNTER SYMPTOMS
PAIN LOCATION: LEFT LEG
PAIN: 1
PAIN SEVERITY GOAL: 0/10
PAIN LOCATION - PAIN SEVERITY: 7/10
LOWEST PAIN SEVERITY IN PAST 24 HOURS: 7/10
SUBJECTIVE PAIN PROGRESSION: UNCHANGED
PAIN LOCATION - PAIN QUALITY: ACHY, SHARP
PERSON REPORTING PAIN: PATIENT
HIGHEST PAIN SEVERITY IN PAST 24 HOURS: 10/10
PAIN LOCATION - PAIN FREQUENCY: CONSTANT

## 2024-07-07 DIAGNOSIS — M86.662: Primary | ICD-10-CM

## 2024-07-07 RX ORDER — HYDROCODONE BITARTRATE AND ACETAMINOPHEN 5; 325 MG/1; MG/1
1 TABLET ORAL EVERY 6 HOURS PRN
Qty: 28 TABLET | Refills: 0 | Status: SHIPPED | OUTPATIENT
Start: 2024-07-07 | End: 2024-07-14

## 2024-07-07 NOTE — PROGRESS NOTES
Pt called for pain medicine. DOS 6/25/24. Rates pain a 6/10. I have personally reviewed the OARRS report for the patient, no issues identified. This report is scanned into the EMR. I have considered the risks of abuse, dependence, addiction, and diversion. The 7 day Rx sent to pharmacy on file. XAVI HERNANDEZ

## 2024-07-09 ENCOUNTER — LAB REQUISITION (OUTPATIENT)
Dept: LAB | Facility: HOSPITAL | Age: 39
End: 2024-07-09
Payer: MEDICAID

## 2024-07-09 ENCOUNTER — HOME CARE VISIT (OUTPATIENT)
Dept: HOME HEALTH SERVICES | Facility: HOME HEALTH | Age: 39
End: 2024-07-09
Payer: MEDICAID

## 2024-07-09 VITALS
OXYGEN SATURATION: 96 % | TEMPERATURE: 98.1 F | HEART RATE: 76 BPM | DIASTOLIC BLOOD PRESSURE: 64 MMHG | SYSTOLIC BLOOD PRESSURE: 90 MMHG | RESPIRATION RATE: 16 BRPM

## 2024-07-09 DIAGNOSIS — M86.162: ICD-10-CM

## 2024-07-09 LAB
ALBUMIN SERPL BCP-MCNC: 3.3 G/DL (ref 3.4–5)
ALP SERPL-CCNC: 65 U/L (ref 33–120)
ALT SERPL W P-5'-P-CCNC: 32 U/L (ref 10–52)
ANION GAP SERPL CALC-SCNC: 12 MMOL/L (ref 10–20)
AST SERPL W P-5'-P-CCNC: 35 U/L (ref 9–39)
BASOPHILS # BLD AUTO: 0.07 X10*3/UL (ref 0–0.1)
BASOPHILS NFR BLD AUTO: 0.7 %
BILIRUB SERPL-MCNC: 0.2 MG/DL (ref 0–1.2)
BUN SERPL-MCNC: 26 MG/DL (ref 6–23)
CALCIUM SERPL-MCNC: 8.5 MG/DL (ref 8.6–10.3)
CHLORIDE SERPL-SCNC: 105 MMOL/L (ref 98–107)
CO2 SERPL-SCNC: 25 MMOL/L (ref 21–32)
CREAT SERPL-MCNC: 0.94 MG/DL (ref 0.5–1.3)
CRP SERPL-MCNC: 1.88 MG/DL
EGFRCR SERPLBLD CKD-EPI 2021: >90 ML/MIN/1.73M*2
EOSINOPHIL # BLD AUTO: 0.33 X10*3/UL (ref 0–0.7)
EOSINOPHIL NFR BLD AUTO: 3.2 %
ERYTHROCYTE [DISTWIDTH] IN BLOOD BY AUTOMATED COUNT: 14.8 % (ref 11.5–14.5)
GLUCOSE SERPL-MCNC: 96 MG/DL (ref 74–99)
HCT VFR BLD AUTO: 37.3 % (ref 41–52)
HGB BLD-MCNC: 12.1 G/DL (ref 13.5–17.5)
IMM GRANULOCYTES # BLD AUTO: 0.34 X10*3/UL (ref 0–0.7)
IMM GRANULOCYTES NFR BLD AUTO: 3.3 % (ref 0–0.9)
LYMPHOCYTES # BLD AUTO: 2.58 X10*3/UL (ref 1.2–4.8)
LYMPHOCYTES NFR BLD AUTO: 24.8 %
MCH RBC QN AUTO: 26.5 PG (ref 26–34)
MCHC RBC AUTO-ENTMCNC: 32.4 G/DL (ref 32–36)
MCV RBC AUTO: 82 FL (ref 80–100)
MONOCYTES # BLD AUTO: 0.79 X10*3/UL (ref 0.1–1)
MONOCYTES NFR BLD AUTO: 7.6 %
NEUTROPHILS # BLD AUTO: 6.3 X10*3/UL (ref 1.2–7.7)
NEUTROPHILS NFR BLD AUTO: 60.4 %
NRBC BLD-RTO: 0 /100 WBCS (ref 0–0)
PLATELET # BLD AUTO: 327 X10*3/UL (ref 150–450)
POTASSIUM SERPL-SCNC: 3.9 MMOL/L (ref 3.5–5.3)
PROT SERPL-MCNC: 6.2 G/DL (ref 6.4–8.2)
RBC # BLD AUTO: 4.57 X10*6/UL (ref 4.5–5.9)
SODIUM SERPL-SCNC: 138 MMOL/L (ref 136–145)
WBC # BLD AUTO: 10.4 X10*3/UL (ref 4.4–11.3)

## 2024-07-09 PROCEDURE — G0299 HHS/HOSPICE OF RN EA 15 MIN: HCPCS

## 2024-07-09 PROCEDURE — 86140 C-REACTIVE PROTEIN: CPT

## 2024-07-09 PROCEDURE — 80053 COMPREHEN METABOLIC PANEL: CPT

## 2024-07-09 PROCEDURE — 85025 COMPLETE CBC W/AUTO DIFF WBC: CPT

## 2024-07-09 ASSESSMENT — ENCOUNTER SYMPTOMS
SUBJECTIVE PAIN PROGRESSION: UNCHANGED
APPETITE LEVEL: GOOD
MUSCLE WEAKNESS: 1
PAIN: 1
PERSON REPORTING PAIN: PATIENT
CHANGE IN APPETITE: UNCHANGED
AGITATION: 1
PAIN LOCATION: LEFT LEG

## 2024-07-09 ASSESSMENT — ACTIVITIES OF DAILY LIVING (ADL)
AMBULATION ASSISTANCE: STAND BY ASSIST
CURRENT_FUNCTION: STAND BY ASSIST

## 2024-07-09 ASSESSMENT — PAIN SCALES - PAIN ASSESSMENT IN ADVANCED DEMENTIA (PAINAD)
CONSOLABILITY: 0 - NO NEED TO CONSOLE.
TOTALSCORE: 0
BODYLANGUAGE: 0
BODYLANGUAGE: 0 - RELAXED.
CONSOLABILITY: 0
NEGVOCALIZATION: 0 - NONE.
BREATHING: 0
FACIALEXPRESSION: 0 - SMILING OR INEXPRESSIVE.
FACIALEXPRESSION: 0
NEGVOCALIZATION: 0

## 2024-07-10 ENCOUNTER — HOME CARE VISIT (OUTPATIENT)
Dept: HOME HEALTH SERVICES | Facility: HOME HEALTH | Age: 39
End: 2024-07-10
Payer: MEDICAID

## 2024-07-10 PROCEDURE — G0157 HHC PT ASSISTANT EA 15: HCPCS | Mod: CQ

## 2024-07-10 ASSESSMENT — ENCOUNTER SYMPTOMS
PAIN LOCATION - PAIN QUALITY: ACHING
PAIN LOCATION - RELIEVING FACTORS: ICE, MEDICATION, REST
LOWEST PAIN SEVERITY IN PAST 24 HOURS: 5/10
PAIN LOCATION - EXACERBATING FACTORS: WALKING, ACTIVITY
PAIN LOCATION: LEFT KNEE
PAIN: 1
HIGHEST PAIN SEVERITY IN PAST 24 HOURS: 9/10
PAIN LOCATION - PAIN SEVERITY: 6/10

## 2024-07-11 ENCOUNTER — HOME INFUSION (OUTPATIENT)
Dept: INFUSION THERAPY | Age: 39
End: 2024-07-11
Payer: MEDICAID

## 2024-07-11 NOTE — PROGRESS NOTES
Chart review - Mark Dao is a 39 y.o. patient on home care for L tibia/fibula OM.  Med - cefazolin 2 gm q8 thru 8/6. Dr. Abrams following     Labs from 7/9 reviewed- CRP down to 1.88     [facility] call to pt, tolerating infusions well, confirmed doses in home thru 7/11. Agreeable to delivery of another week of medication and supplies today after 4 pm.      Pharmacy to mix 7/11 and deliver straight with supplies to match after 4 pm:  21x cefazolin 2 gm syringes  DOS 7/12-7/18     Follow up 7/17 - check labs and progress, deliver OVN

## 2024-07-12 ENCOUNTER — HOME CARE VISIT (OUTPATIENT)
Dept: HOME HEALTH SERVICES | Facility: HOME HEALTH | Age: 39
End: 2024-07-12
Payer: MEDICAID

## 2024-07-12 ENCOUNTER — OFFICE VISIT (OUTPATIENT)
Dept: ORTHOPEDIC SURGERY | Facility: HOSPITAL | Age: 39
End: 2024-07-12
Payer: MEDICAID

## 2024-07-12 ENCOUNTER — HOSPITAL ENCOUNTER (OUTPATIENT)
Dept: RADIOLOGY | Facility: HOSPITAL | Age: 39
Discharge: HOME | End: 2024-07-12
Payer: MEDICAID

## 2024-07-12 DIAGNOSIS — M86.662: ICD-10-CM

## 2024-07-12 DIAGNOSIS — M86.662: Primary | ICD-10-CM

## 2024-07-12 PROCEDURE — 99211 OFF/OP EST MAY X REQ PHY/QHP: CPT | Performed by: PHYSICIAN ASSISTANT

## 2024-07-12 PROCEDURE — 73590 X-RAY EXAM OF LOWER LEG: CPT | Mod: LT

## 2024-07-12 ASSESSMENT — PAIN DESCRIPTION - DESCRIPTORS: DESCRIPTORS: ACHING;BURNING;THROBBING

## 2024-07-12 ASSESSMENT — PAIN - FUNCTIONAL ASSESSMENT: PAIN_FUNCTIONAL_ASSESSMENT: 0-10

## 2024-07-12 ASSESSMENT — PAIN SCALES - GENERAL: PAINLEVEL_OUTOF10: 5 - MODERATE PAIN

## 2024-07-15 ENCOUNTER — HOSPITAL ENCOUNTER (OUTPATIENT)
Dept: RADIOLOGY | Facility: HOSPITAL | Age: 39
Discharge: HOME | End: 2024-07-15
Payer: MEDICAID

## 2024-07-15 ENCOUNTER — HOME CARE VISIT (OUTPATIENT)
Dept: HOME HEALTH SERVICES | Facility: HOME HEALTH | Age: 39
End: 2024-07-15
Payer: MEDICAID

## 2024-07-15 VITALS
SYSTOLIC BLOOD PRESSURE: 142 MMHG | HEART RATE: 64 BPM | OXYGEN SATURATION: 98 % | TEMPERATURE: 96.4 F | DIASTOLIC BLOOD PRESSURE: 66 MMHG

## 2024-07-15 DIAGNOSIS — M25.562 LEFT KNEE PAIN, UNSPECIFIED CHRONICITY: ICD-10-CM

## 2024-07-15 DIAGNOSIS — Z96.7 FIXATION HARDWARE IN LEG: ICD-10-CM

## 2024-07-15 PROCEDURE — G0157 HHC PT ASSISTANT EA 15: HCPCS | Mod: CQ

## 2024-07-15 PROCEDURE — 73721 MRI JNT OF LWR EXTRE W/O DYE: CPT | Mod: LT

## 2024-07-15 PROCEDURE — 73721 MRI JNT OF LWR EXTRE W/O DYE: CPT | Mod: LEFT SIDE | Performed by: RADIOLOGY

## 2024-07-15 ASSESSMENT — ENCOUNTER SYMPTOMS
PAIN LOCATION - PAIN SEVERITY: 6/10
SUBJECTIVE PAIN PROGRESSION: WAXING AND WANING
PAIN LOCATION - EXACERBATING FACTORS: ACTIVITY
PAIN: 1
PAIN LOCATION - PAIN FREQUENCY: CONSTANT
PAIN LOCATION: LEFT LEG
LOWEST PAIN SEVERITY IN PAST 24 HOURS: 5/10
HIGHEST PAIN SEVERITY IN PAST 24 HOURS: 9/10
PAIN LOCATION - RELIEVING FACTORS: MEDICATION, REST

## 2024-07-16 ENCOUNTER — LAB REQUISITION (OUTPATIENT)
Dept: LAB | Facility: HOSPITAL | Age: 39
End: 2024-07-16
Payer: MEDICAID

## 2024-07-16 ENCOUNTER — HOME CARE VISIT (OUTPATIENT)
Dept: HOME HEALTH SERVICES | Facility: HOME HEALTH | Age: 39
End: 2024-07-16
Payer: MEDICAID

## 2024-07-16 VITALS
SYSTOLIC BLOOD PRESSURE: 120 MMHG | HEART RATE: 76 BPM | TEMPERATURE: 98 F | OXYGEN SATURATION: 99 % | DIASTOLIC BLOOD PRESSURE: 86 MMHG | RESPIRATION RATE: 16 BRPM

## 2024-07-16 DIAGNOSIS — M86.662: ICD-10-CM

## 2024-07-16 LAB
ALBUMIN SERPL BCP-MCNC: 3.5 G/DL (ref 3.4–5)
ALP SERPL-CCNC: 62 U/L (ref 33–120)
ALT SERPL W P-5'-P-CCNC: 16 U/L (ref 10–52)
ANION GAP SERPL CALC-SCNC: 12 MMOL/L (ref 10–20)
AST SERPL W P-5'-P-CCNC: 22 U/L (ref 9–39)
BASOPHILS # BLD AUTO: 0.07 X10*3/UL (ref 0–0.1)
BASOPHILS NFR BLD AUTO: 0.8 %
BILIRUB SERPL-MCNC: 0.2 MG/DL (ref 0–1.2)
BUN SERPL-MCNC: 22 MG/DL (ref 6–23)
CALCIUM SERPL-MCNC: 8.9 MG/DL (ref 8.6–10.3)
CHLORIDE SERPL-SCNC: 104 MMOL/L (ref 98–107)
CO2 SERPL-SCNC: 25 MMOL/L (ref 21–32)
CREAT SERPL-MCNC: 1.04 MG/DL (ref 0.5–1.3)
CRP SERPL-MCNC: 0.65 MG/DL
EGFRCR SERPLBLD CKD-EPI 2021: >90 ML/MIN/1.73M*2
EOSINOPHIL # BLD AUTO: 0.38 X10*3/UL (ref 0–0.7)
EOSINOPHIL NFR BLD AUTO: 4.4 %
ERYTHROCYTE [DISTWIDTH] IN BLOOD BY AUTOMATED COUNT: 15 % (ref 11.5–14.5)
GLUCOSE SERPL-MCNC: 128 MG/DL (ref 74–99)
HCT VFR BLD AUTO: 39.3 % (ref 41–52)
HGB BLD-MCNC: 12.7 G/DL (ref 13.5–17.5)
IMM GRANULOCYTES # BLD AUTO: 0.13 X10*3/UL (ref 0–0.7)
IMM GRANULOCYTES NFR BLD AUTO: 1.5 % (ref 0–0.9)
LYMPHOCYTES # BLD AUTO: 2.47 X10*3/UL (ref 1.2–4.8)
LYMPHOCYTES NFR BLD AUTO: 28.7 %
MCH RBC QN AUTO: 26.2 PG (ref 26–34)
MCHC RBC AUTO-ENTMCNC: 32.3 G/DL (ref 32–36)
MCV RBC AUTO: 81 FL (ref 80–100)
MONOCYTES # BLD AUTO: 0.63 X10*3/UL (ref 0.1–1)
MONOCYTES NFR BLD AUTO: 7.3 %
NEUTROPHILS # BLD AUTO: 4.94 X10*3/UL (ref 1.2–7.7)
NEUTROPHILS NFR BLD AUTO: 57.3 %
NRBC BLD-RTO: 0 /100 WBCS (ref 0–0)
PLATELET # BLD AUTO: 300 X10*3/UL (ref 150–450)
POTASSIUM SERPL-SCNC: 3.7 MMOL/L (ref 3.5–5.3)
PROT SERPL-MCNC: 6.4 G/DL (ref 6.4–8.2)
RBC # BLD AUTO: 4.85 X10*6/UL (ref 4.5–5.9)
SODIUM SERPL-SCNC: 137 MMOL/L (ref 136–145)
WBC # BLD AUTO: 8.6 X10*3/UL (ref 4.4–11.3)

## 2024-07-16 PROCEDURE — 86140 C-REACTIVE PROTEIN: CPT

## 2024-07-16 PROCEDURE — G0299 HHS/HOSPICE OF RN EA 15 MIN: HCPCS

## 2024-07-16 PROCEDURE — 80053 COMPREHEN METABOLIC PANEL: CPT

## 2024-07-16 PROCEDURE — 85025 COMPLETE CBC W/AUTO DIFF WBC: CPT

## 2024-07-16 ASSESSMENT — ENCOUNTER SYMPTOMS
FATIGUE: 1
PAIN LOCATION - PAIN SEVERITY: 5/10
PAIN: 1
LOWEST PAIN SEVERITY IN PAST 24 HOURS: 5/10
APPETITE LEVEL: GOOD
PAIN SEVERITY GOAL: 5/10
PAIN LOCATION: LEFT KNEE
PERSON REPORTING PAIN: PATIENT
DEPRESSED MOOD: 1
MUSCLE WEAKNESS: 1
HIGHEST PAIN SEVERITY IN PAST 24 HOURS: 5/10
AGITATION: 1
SUBJECTIVE PAIN PROGRESSION: UNCHANGED
LIMITED RANGE OF MOTION: 1
CHANGE IN APPETITE: UNCHANGED

## 2024-07-16 ASSESSMENT — PAIN SCALES - PAIN ASSESSMENT IN ADVANCED DEMENTIA (PAINAD)
TOTALSCORE: 0
CONSOLABILITY: 0
FACIALEXPRESSION: 0
NEGVOCALIZATION: 0 - NONE.
NEGVOCALIZATION: 0
BODYLANGUAGE: 0
BODYLANGUAGE: 0 - RELAXED.
FACIALEXPRESSION: 0 - SMILING OR INEXPRESSIVE.
BREATHING: 0
CONSOLABILITY: 0 - NO NEED TO CONSOLE.

## 2024-07-16 ASSESSMENT — ACTIVITIES OF DAILY LIVING (ADL)
CURRENT_FUNCTION: STAND BY ASSIST
AMBULATION ASSISTANCE: STAND BY ASSIST

## 2024-07-16 NOTE — PROGRESS NOTES
History of Present Illness   Mark Dao is a 39 y.o. male presenting today for post-op check from removal of L tibia hardware for tx of osteo on 6/25/24. Overall doing ok. Has mild pain that is not requiring narcotics for pain control.  Incisions are well healing without redness or drainage; has removed a majority of his staples/stitches himself due to irritation with them. Compliant with WB recommendations. Denies numbness, tingling, f/c, CP, SOB or any other complaints or concerns.      History reviewed. No pertinent past medical history.    Medication Documentation Review Audit       Reviewed by Della Russell MA (Medical Assistant) on 07/12/24 at 1029      Medication Order Taking? Sig Documenting Provider Last Dose Status   acetaminophen (Tylenol) 500 mg tablet [id number] Yes Take 1 tablet (500 mg) by mouth every 6 hours for 28 days.   Patient taking differently: Take 1 tablet (500 mg) by mouth 4 times a day as needed for mild pain (1 - 3).    Jovani Robert MD Taking Active   aspirin 81 mg EC tablet [id number] Yes Take 1 tablet (81 mg) by mouth 2 times a day for 28 days. Jovani Robert MD Taking Active   calcium carbonate-vitamin D3 500 mg-5 mcg (200 unit) tablet [id number] Yes Take 1 tablet by mouth 2 times a day. Jovani Robert MD Taking Active   ceFAZolin (Ancef) 2 gram/50 mL IV [id number] Yes Infuse 50 mL (2 g) at 100 mL/hr over 30 minutes into a venous catheter every 8 hours. Jovani Robert MD Taking Active   cefazolin sodium/water (ceFAZolin in sterile water) 2 gram/20 mL syringe [id number] Yes Infuse 2 g into a venous catheter every 8 hours. Indications: antibiotic Historical Provider, MD Taking Active   heparin flush (heparin LockFlush,Porcine,,PF,) 10 unit/mL injection [id number] Yes Infuse 5 mL (50 Units) into a venous catheter 3 times a day. after iv atb and prn for lab work Historical Provider, MD Taking Active   HYDROcodone-acetaminophen (Norco) 5-325 mg tablet [id number] Yes Take 1 tablet by mouth  every 6 hours if needed for severe pain (7 - 10) for up to 7 days. Debbi Ward PA-C Taking Active   morphine (MSIR) 15 mg tablet [id number] Yes Take 0.5 tablets (7.5 mg) by mouth every 4 hours if needed for severe pain (7 - 10). Jovani Robert MD Taking Active   pantoprazole (ProtoNix) 40 mg EC tablet [id number] Yes Take 1 tablet (40 mg) by mouth once daily in the morning. Take before meals for 28 days. Do not crush, chew, or split. Jovani Robert MD Taking Active   polyethylene glycol (Glycolax, Miralax) 17 gram packet [id number] Yes Take 17 g by mouth once daily.   Patient taking differently: Take 17 g by mouth once daily as needed (constipation).    Jovani Robert MD Taking Active   sodium chloride 0.9% (sodium chloride) flush [id number] Yes Infuse 10 mL into a venous catheter 3 times a day. before and after iv atb push and prn for lab work Historical Provider, MD Taking Active                    No Known Allergies    Social History     Socioeconomic History    Marital status: Single     Spouse name: Not on file    Number of children: Not on file    Years of education: Not on file    Highest education level: Not on file   Occupational History    Not on file   Tobacco Use    Smoking status: Every Day     Types: Cigarettes, Pipe    Smokeless tobacco: Never   Vaping Use    Vaping status: Never Used   Substance and Sexual Activity    Alcohol use: Yes     Comment: occasional    Drug use: Never    Sexual activity: Defer   Other Topics Concern    Not on file   Social History Narrative    Not on file     Social Determinants of Health     Financial Resource Strain: Not on file   Food Insecurity: Not on file   Transportation Needs: No Transportation Needs (6/29/2024)    OASIS : Transportation     Lack of Transportation (Medical): No     Lack of Transportation (Non-Medical): No     Patient Unable or Declines to Respond: No   Physical Activity: Not on file   Stress: Not on file   Social Connections: Feeling Socially  Integrated (6/29/2024)    OASIS : Social Isolation     Frequency of experiencing loneliness or isolation: Never   Intimate Partner Violence: Not on file   Housing Stability: Not on file       Past Surgical History:   Procedure Laterality Date    CT AORTA AND BILATERAL ILIOFEMORAL RUNOFF ANGIOGRAM W AND/OR WO IV CONTRAST  7/19/2023    CT AORTA AND BILATERAL ILIOFEMORAL RUNOFF ANGIOGRAM W AND/OR WO IV CONTRAST 7/19/2023 GEN CT          Review of Systems:  30 point ROS reviewed and negative other than as listed in the HPI     Physical Exam:  Gen: The pt is A&Ox3, NAD, and appear state age and weight  Psychiatric: mood and affect are appropriate   Eyes: sclera are white, EOM grossly intact  ENT: MMM  Neck: supple, thyroid is midline  Respiratory: respirations are nonlabored, chest rise symmetric  CV: rate is regular by palpation of distal pulses  Abdomen: nondistended   Integument: no obvious cutaneous lesions noted. No signs of lymphangitis. No signs of systemic edema.   MSK:  LLE  surgical incision well healing without edema, erythema, drainage, or other s/sx of infection. Appropriately tender to palpation around the incision. SILT throughout the leg intact. Intact plantarflexion and dorsiflexion. Foot warm and well perfused.      Imaging:  I personally reviewed multiple views of the  L tib/fib  were obtained in the office today demonstrate maintenance of reduction, interval healing, and a stable position of the hardware.      Assessment   39 y.o. male post-op from removal of hardware from L tibia for tx of osteo on 6/25/24.     Plan:  Continue WBAT on  LLE   Incisions well healing; sutures/staples removed in office and steri's placed with wound care instructed   Continue DVT ppx and vit d/calcium for bone health     Follow-up 1 month with 2 views left tib/fib.     All of the patient's questions/concerns address and they are in agreement with the plan.

## 2024-07-17 ENCOUNTER — HOME INFUSION (OUTPATIENT)
Dept: INFUSION THERAPY | Age: 39
End: 2024-07-17
Payer: MEDICAID

## 2024-07-17 ENCOUNTER — APPOINTMENT (OUTPATIENT)
Dept: ORTHOPEDIC SURGERY | Facility: CLINIC | Age: 39
End: 2024-07-17
Payer: MEDICAID

## 2024-07-17 ENCOUNTER — DOCUMENTATION (OUTPATIENT)
Dept: INFUSION THERAPY | Age: 39
End: 2024-07-17
Payer: MEDICAID

## 2024-07-17 NOTE — PROGRESS NOTES
SPOKE W/PT, AND HE AGREED TO ANOTHER WEEK OF CEFAZOLIN THERAPY WITH MATCHING SUPPLIES FOR DELIVERY ANYTIME TOMORROW...HAD NO QUESTIONS FOR [facility] AT THIS TIME...

## 2024-07-17 NOTE — PROGRESS NOTES
Chart review - Mark Dao is a 39 y.o. patient on home care for L tibia/fibula OM.  Med - cefazolin 2 gm q8 thru 8/6. Dr. Abrams following     Labs from 7/16 reviewed- CRP now WNL     7/16 RN notes reviewed - ACE wrap and stitches removed, wounds healed. Making progress with leg/foot mobility.     Pharmacy to mix 7/17 and deliver 7/18:  21x cefazolin 2 gm syringes  DOS 7/19-7/25     Follow up 7/24 - check labs and progress, deliver OVN

## 2024-07-18 ENCOUNTER — HOME CARE VISIT (OUTPATIENT)
Dept: HOME HEALTH SERVICES | Facility: HOME HEALTH | Age: 39
End: 2024-07-18
Payer: MEDICAID

## 2024-07-18 PROCEDURE — G0157 HHC PT ASSISTANT EA 15: HCPCS | Mod: CQ

## 2024-07-18 ASSESSMENT — ENCOUNTER SYMPTOMS
PAIN LOCATION - RELIEVING FACTORS: ELEVATION, MEDICATION
PAIN LOCATION - PAIN SEVERITY: 5/10
PAIN LOCATION - PAIN FREQUENCY: CONSTANT
PAIN LOCATION: LEFT KNEE
SUBJECTIVE PAIN PROGRESSION: WAXING AND WANING
HIGHEST PAIN SEVERITY IN PAST 24 HOURS: 9/10
PAIN: 1
LOWEST PAIN SEVERITY IN PAST 24 HOURS: 5/10

## 2024-07-23 ENCOUNTER — HOME CARE VISIT (OUTPATIENT)
Dept: HOME HEALTH SERVICES | Facility: HOME HEALTH | Age: 39
End: 2024-07-23
Payer: MEDICAID

## 2024-07-23 ENCOUNTER — APPOINTMENT (OUTPATIENT)
Dept: ORTHOPEDIC SURGERY | Facility: CLINIC | Age: 39
End: 2024-07-23
Payer: MEDICAID

## 2024-07-23 ENCOUNTER — LAB REQUISITION (OUTPATIENT)
Dept: LAB | Facility: HOSPITAL | Age: 39
End: 2024-07-23
Payer: MEDICAID

## 2024-07-23 VITALS
HEART RATE: 64 BPM | OXYGEN SATURATION: 98 % | SYSTOLIC BLOOD PRESSURE: 128 MMHG | RESPIRATION RATE: 16 BRPM | TEMPERATURE: 99.1 F | DIASTOLIC BLOOD PRESSURE: 70 MMHG

## 2024-07-23 DIAGNOSIS — M86.662: ICD-10-CM

## 2024-07-23 LAB
ALBUMIN SERPL BCP-MCNC: 3.8 G/DL (ref 3.4–5)
ALP SERPL-CCNC: 56 U/L (ref 33–120)
ALT SERPL W P-5'-P-CCNC: 7 U/L (ref 10–52)
ANION GAP SERPL CALC-SCNC: 10 MMOL/L (ref 10–20)
AST SERPL W P-5'-P-CCNC: 13 U/L (ref 9–39)
BASOPHILS # BLD AUTO: 0.05 X10*3/UL (ref 0–0.1)
BASOPHILS NFR BLD AUTO: 0.6 %
BILIRUB SERPL-MCNC: 0.3 MG/DL (ref 0–1.2)
BUN SERPL-MCNC: 21 MG/DL (ref 6–23)
CALCIUM SERPL-MCNC: 8.9 MG/DL (ref 8.6–10.3)
CHLORIDE SERPL-SCNC: 107 MMOL/L (ref 98–107)
CO2 SERPL-SCNC: 25 MMOL/L (ref 21–32)
CREAT SERPL-MCNC: 0.91 MG/DL (ref 0.5–1.3)
CRP SERPL-MCNC: 0.45 MG/DL
EGFRCR SERPLBLD CKD-EPI 2021: >90 ML/MIN/1.73M*2
EOSINOPHIL # BLD AUTO: 0.4 X10*3/UL (ref 0–0.7)
EOSINOPHIL NFR BLD AUTO: 4.8 %
ERYTHROCYTE [DISTWIDTH] IN BLOOD BY AUTOMATED COUNT: 15.3 % (ref 11.5–14.5)
GLUCOSE SERPL-MCNC: 110 MG/DL (ref 74–99)
HCT VFR BLD AUTO: 40.4 % (ref 41–52)
HGB BLD-MCNC: 13.1 G/DL (ref 13.5–17.5)
IMM GRANULOCYTES # BLD AUTO: 0.06 X10*3/UL (ref 0–0.7)
IMM GRANULOCYTES NFR BLD AUTO: 0.7 % (ref 0–0.9)
LYMPHOCYTES # BLD AUTO: 1.77 X10*3/UL (ref 1.2–4.8)
LYMPHOCYTES NFR BLD AUTO: 21.4 %
MCH RBC QN AUTO: 26.6 PG (ref 26–34)
MCHC RBC AUTO-ENTMCNC: 32.4 G/DL (ref 32–36)
MCV RBC AUTO: 82 FL (ref 80–100)
MONOCYTES # BLD AUTO: 0.82 X10*3/UL (ref 0.1–1)
MONOCYTES NFR BLD AUTO: 9.9 %
NEUTROPHILS # BLD AUTO: 5.19 X10*3/UL (ref 1.2–7.7)
NEUTROPHILS NFR BLD AUTO: 62.6 %
NRBC BLD-RTO: 0 /100 WBCS (ref 0–0)
PLATELET # BLD AUTO: 230 X10*3/UL (ref 150–450)
POTASSIUM SERPL-SCNC: 4.1 MMOL/L (ref 3.5–5.3)
PROT SERPL-MCNC: 6.5 G/DL (ref 6.4–8.2)
RBC # BLD AUTO: 4.93 X10*6/UL (ref 4.5–5.9)
SODIUM SERPL-SCNC: 138 MMOL/L (ref 136–145)
WBC # BLD AUTO: 8.3 X10*3/UL (ref 4.4–11.3)

## 2024-07-23 PROCEDURE — 86140 C-REACTIVE PROTEIN: CPT

## 2024-07-23 PROCEDURE — 80053 COMPREHEN METABOLIC PANEL: CPT

## 2024-07-23 PROCEDURE — 85025 COMPLETE CBC W/AUTO DIFF WBC: CPT

## 2024-07-23 PROCEDURE — G0299 HHS/HOSPICE OF RN EA 15 MIN: HCPCS

## 2024-07-23 SDOH — ECONOMIC STABILITY: FOOD INSECURITY: MEALS PER DAY: 3

## 2024-07-23 ASSESSMENT — PAIN SCALES - PAIN ASSESSMENT IN ADVANCED DEMENTIA (PAINAD)
TOTALSCORE: 0
CONSOLABILITY: 0
FACIALEXPRESSION: 0
BREATHING: 0
CONSOLABILITY: 0 - NO NEED TO CONSOLE.
NEGVOCALIZATION: 0
BODYLANGUAGE: 0 - RELAXED.
NEGVOCALIZATION: 0 - NONE.
BODYLANGUAGE: 0
FACIALEXPRESSION: 0 - SMILING OR INEXPRESSIVE.

## 2024-07-23 ASSESSMENT — ENCOUNTER SYMPTOMS
APPETITE LEVEL: GOOD
MUSCLE WEAKNESS: 1
PERSON REPORTING PAIN: PATIENT
PAIN: 1
LIMITED RANGE OF MOTION: 1
CHANGE IN APPETITE: UNCHANGED
AGITATION: 1
PAIN LOCATION: LEFT LEG

## 2024-07-23 ASSESSMENT — ACTIVITIES OF DAILY LIVING (ADL)
AMBULATION ASSISTANCE: STAND BY ASSIST
CURRENT_FUNCTION: ONE PERSON

## 2024-07-24 ENCOUNTER — HOME CARE VISIT (OUTPATIENT)
Dept: HOME HEALTH SERVICES | Facility: HOME HEALTH | Age: 39
End: 2024-07-24
Payer: MEDICAID

## 2024-07-24 ENCOUNTER — DOCUMENTATION (OUTPATIENT)
Dept: INFUSION THERAPY | Age: 39
End: 2024-07-24
Payer: MEDICAID

## 2024-07-24 ENCOUNTER — HOME INFUSION (OUTPATIENT)
Dept: INFUSION THERAPY | Age: 39
End: 2024-07-24
Payer: MEDICAID

## 2024-07-24 NOTE — PROGRESS NOTES
Chart review - Mark Dao is a 39 y.o. patient on home care for L tibia/fibula OM.  Med - cefazolin 2 gm q8 thru 8/6. Dr. Abrams following     Labs from 7/23 reviewed- unremarkable    7/23 RN notes unremarkable     Pharmacy to mix 7/24 and deliver 7/25:  21x cefazolin 2 gm syringes  DOS 7/26-8/1     Follow up 7/31 - check labs and progress, RMDR OVN

## 2024-07-24 NOTE — PROGRESS NOTES
SPOKE W/PT, AND HE AGREED TO ANOTHER WEEK OF CEFAZOLIN THERAPY WITH MATCHING SUPPLIES FOR DELIVERY ANYTIME TOMORROW...HAD NO QUESTIONS FOR [facility] AT THIS TIME...

## 2024-07-26 ENCOUNTER — HOME CARE VISIT (OUTPATIENT)
Dept: HOME HEALTH SERVICES | Facility: HOME HEALTH | Age: 39
End: 2024-07-26
Payer: MEDICAID
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
## 2024-07-30 ENCOUNTER — HOME INFUSION (OUTPATIENT)
Dept: INFUSION THERAPY | Age: 39
End: 2024-07-30

## 2024-07-30 ENCOUNTER — LAB REQUISITION (OUTPATIENT)
Dept: LAB | Facility: HOSPITAL | Age: 39
End: 2024-07-30
Payer: MEDICAID

## 2024-07-30 ENCOUNTER — HOME CARE VISIT (OUTPATIENT)
Dept: HOME HEALTH SERVICES | Facility: HOME HEALTH | Age: 39
End: 2024-07-30
Payer: MEDICAID

## 2024-07-30 ENCOUNTER — APPOINTMENT (OUTPATIENT)
Dept: INFECTIOUS DISEASES | Facility: CLINIC | Age: 39
End: 2024-07-30
Payer: MEDICAID

## 2024-07-30 VITALS
HEART RATE: 80 BPM | SYSTOLIC BLOOD PRESSURE: 140 MMHG | HEIGHT: 75 IN | OXYGEN SATURATION: 98 % | BODY MASS INDEX: 27.35 KG/M2 | WEIGHT: 220 LBS | DIASTOLIC BLOOD PRESSURE: 60 MMHG | TEMPERATURE: 98.2 F | RESPIRATION RATE: 16 BRPM

## 2024-07-30 VITALS — TEMPERATURE: 98.2 F | OXYGEN SATURATION: 98 %

## 2024-07-30 DIAGNOSIS — S82.892D OTHER FRACTURE OF LEFT LOWER LEG, SUBSEQUENT ENCOUNTER FOR CLOSED FRACTURE WITH ROUTINE HEALING: ICD-10-CM

## 2024-07-30 DIAGNOSIS — M86.662: Primary | ICD-10-CM

## 2024-07-30 DIAGNOSIS — A49.01 METHICILLIN SUSCEPTIBLE STAPHYLOCOCCUS AUREUS INFECTION: ICD-10-CM

## 2024-07-30 DIAGNOSIS — A49.1 GROUP B STREPTOCOCCAL INFECTION: ICD-10-CM

## 2024-07-30 DIAGNOSIS — M86.662: ICD-10-CM

## 2024-07-30 DIAGNOSIS — T84.623S: ICD-10-CM

## 2024-07-30 LAB
ALBUMIN SERPL BCP-MCNC: 4 G/DL (ref 3.4–5)
ALP SERPL-CCNC: 66 U/L (ref 33–120)
ALT SERPL W P-5'-P-CCNC: 10 U/L (ref 10–52)
ANION GAP SERPL CALC-SCNC: 10 MMOL/L (ref 10–20)
AST SERPL W P-5'-P-CCNC: 18 U/L (ref 9–39)
BASOPHILS # BLD AUTO: 0.06 X10*3/UL (ref 0–0.1)
BASOPHILS NFR BLD AUTO: 0.8 %
BILIRUB SERPL-MCNC: 0.3 MG/DL (ref 0–1.2)
BUN SERPL-MCNC: 17 MG/DL (ref 6–23)
CALCIUM SERPL-MCNC: 9 MG/DL (ref 8.6–10.3)
CHLORIDE SERPL-SCNC: 104 MMOL/L (ref 98–107)
CO2 SERPL-SCNC: 26 MMOL/L (ref 21–32)
CREAT SERPL-MCNC: 0.95 MG/DL (ref 0.5–1.3)
CRP SERPL-MCNC: 0.45 MG/DL
EGFRCR SERPLBLD CKD-EPI 2021: >90 ML/MIN/1.73M*2
EOSINOPHIL # BLD AUTO: 0.51 X10*3/UL (ref 0–0.7)
EOSINOPHIL NFR BLD AUTO: 6.4 %
ERYTHROCYTE [DISTWIDTH] IN BLOOD BY AUTOMATED COUNT: 16.3 % (ref 11.5–14.5)
GLUCOSE SERPL-MCNC: 86 MG/DL (ref 74–99)
HCT VFR BLD AUTO: 44.3 % (ref 41–52)
HGB BLD-MCNC: 14.3 G/DL (ref 13.5–17.5)
IMM GRANULOCYTES # BLD AUTO: 0.05 X10*3/UL (ref 0–0.7)
IMM GRANULOCYTES NFR BLD AUTO: 0.6 % (ref 0–0.9)
LYMPHOCYTES # BLD AUTO: 1.78 X10*3/UL (ref 1.2–4.8)
LYMPHOCYTES NFR BLD AUTO: 22.3 %
MCH RBC QN AUTO: 26.4 PG (ref 26–34)
MCHC RBC AUTO-ENTMCNC: 32.3 G/DL (ref 32–36)
MCV RBC AUTO: 82 FL (ref 80–100)
MONOCYTES # BLD AUTO: 0.84 X10*3/UL (ref 0.1–1)
MONOCYTES NFR BLD AUTO: 10.5 %
NEUTROPHILS # BLD AUTO: 4.75 X10*3/UL (ref 1.2–7.7)
NEUTROPHILS NFR BLD AUTO: 59.4 %
NRBC BLD-RTO: 0 /100 WBCS (ref 0–0)
PLATELET # BLD AUTO: 173 X10*3/UL (ref 150–450)
POTASSIUM SERPL-SCNC: 4.1 MMOL/L (ref 3.5–5.3)
PROT SERPL-MCNC: 6.8 G/DL (ref 6.4–8.2)
RBC # BLD AUTO: 5.42 X10*6/UL (ref 4.5–5.9)
SODIUM SERPL-SCNC: 136 MMOL/L (ref 136–145)
WBC # BLD AUTO: 8 X10*3/UL (ref 4.4–11.3)

## 2024-07-30 PROCEDURE — 86140 C-REACTIVE PROTEIN: CPT

## 2024-07-30 PROCEDURE — G0299 HHS/HOSPICE OF RN EA 15 MIN: HCPCS

## 2024-07-30 PROCEDURE — 80053 COMPREHEN METABOLIC PANEL: CPT

## 2024-07-30 PROCEDURE — 99214 OFFICE O/P EST MOD 30 MIN: CPT | Performed by: INTERNAL MEDICINE

## 2024-07-30 PROCEDURE — 85025 COMPLETE CBC W/AUTO DIFF WBC: CPT

## 2024-07-30 RX ORDER — LINEZOLID 600 MG/1
600 TABLET, FILM COATED ORAL 2 TIMES DAILY
Qty: 14 TABLET | Refills: 0 | Status: SHIPPED | OUTPATIENT
Start: 2024-07-30 | End: 2024-07-30

## 2024-07-30 RX ORDER — CEPHALEXIN 500 MG/1
1000 CAPSULE ORAL 4 TIMES DAILY
Qty: 56 CAPSULE | Refills: 0 | Status: SHIPPED | OUTPATIENT
Start: 2024-07-30 | End: 2024-08-06

## 2024-07-30 ASSESSMENT — ACTIVITIES OF DAILY LIVING (ADL)
AMBULATION ASSISTANCE: 1
SHOPPING ASSESSED: 1
TOILETING: STAND BY ASSIST
BATHING ASSESSED: 1
GROOMING ASSESSED: 1
BATHING_CURRENT_FUNCTION: STAND BY ASSIST
FEEDING ASSESSED: 1
OASIS_M1830: 03
LAUNDRY ASSESSED: 1
DRESSING_UB_CURRENT_FUNCTION: STAND BY ASSIST
CURRENT_FUNCTION: STAND BY ASSIST
LIGHT HOUSEKEEPING: DEPENDENT
LAUNDRY: DEPENDENT
ORAL_CARE_ASSESSED: 1
TRANSPORTATION: DEPENDENT
GROOMING_CURRENT_FUNCTION: STAND BY ASSIST
TOILETING: 1
ORAL_CARE_CURRENT_FUNCTION: INDEPENDENT
CURRENT_FUNCTION: ONE PERSON
TRANSPORTATION ASSESSED: 1
SHOPPING: DEPENDENT
TELEPHONE USE ASSESSED: 1
HOME_HEALTH_OASIS: 01
PREPARING MEALS: DEPENDENT
DRESSING_LB_CURRENT_FUNCTION: STAND BY ASSIST
PHYSICAL TRANSFERS ASSESSED: 1
HOUSEKEEPING ASSESSED: 1
USING THE TELPHONE: INDEPENDENT
DRESSING_LB_CURRENT_FUNCTION: MINIMUM ASSIST
AMBULATION ASSISTANCE: STAND BY ASSIST
FEEDING: STAND BY ASSIST

## 2024-07-30 ASSESSMENT — ENCOUNTER SYMPTOMS
PERSON REPORTING PAIN: PATIENT
SUBJECTIVE PAIN PROGRESSION: GRADUALLY IMPROVING
PAIN: 1
PAIN LOCATION: LEFT KNEE

## 2024-07-30 ASSESSMENT — PAIN SCALES - PAIN ASSESSMENT IN ADVANCED DEMENTIA (PAINAD)
BODYLANGUAGE: 0
BREATHING: 0
BODYLANGUAGE: 0 - RELAXED.
CONSOLABILITY: 0 - NO NEED TO CONSOLE.
FACIALEXPRESSION: 0 - SMILING OR INEXPRESSIVE.
NEGVOCALIZATION: 0 - NONE.
CONSOLABILITY: 0
FACIALEXPRESSION: 0
NEGVOCALIZATION: 0
TOTALSCORE: 0

## 2024-07-30 NOTE — PROGRESS NOTES
Received orders from Dr Abrams  Stop antibiotic as ordered after today's dose. HC RN to remove line. Per  Dr Abrams, line removed at RN visit before this writing.    Order entered into Epic. Gold copy to intake.    Pt care rep to discharge pt from [facility] and discharge chart

## 2024-08-21 ENCOUNTER — APPOINTMENT (OUTPATIENT)
Dept: ORTHOPEDIC SURGERY | Facility: CLINIC | Age: 39
End: 2024-08-21
Payer: MEDICAID

## 2024-08-26 ENCOUNTER — APPOINTMENT (OUTPATIENT)
Dept: PRIMARY CARE | Facility: CLINIC | Age: 39
End: 2024-08-26
Payer: MEDICAID

## 2024-09-05 ENCOUNTER — APPOINTMENT (OUTPATIENT)
Dept: PRIMARY CARE | Facility: CLINIC | Age: 39
End: 2024-09-05
Payer: MEDICAID

## 2024-09-05 VITALS
SYSTOLIC BLOOD PRESSURE: 120 MMHG | WEIGHT: 230 LBS | TEMPERATURE: 97.8 F | DIASTOLIC BLOOD PRESSURE: 88 MMHG | BODY MASS INDEX: 28.75 KG/M2 | OXYGEN SATURATION: 98 % | HEART RATE: 97 BPM

## 2024-09-05 DIAGNOSIS — S82.892S CLOSED LEFT ANKLE FRACTURE, SEQUELA: ICD-10-CM

## 2024-09-05 DIAGNOSIS — M86.662: Primary | ICD-10-CM

## 2024-09-05 DIAGNOSIS — M65.9 SYNOVITIS OF LEFT KNEE: ICD-10-CM

## 2024-09-05 PROBLEM — A41.9 SEPSIS (MULTI): Status: ACTIVE | Noted: 2024-09-05

## 2024-09-05 PROBLEM — L02.91 ABSCESS: Status: ACTIVE | Noted: 2024-09-05

## 2024-09-05 PROBLEM — M79.605 PAIN OF LEFT LOWER EXTREMITY: Status: ACTIVE | Noted: 2024-09-05

## 2024-09-05 PROCEDURE — 99204 OFFICE O/P NEW MOD 45 MIN: CPT

## 2024-09-05 RX ORDER — PANTOPRAZOLE SODIUM 20 MG/1
40 TABLET, DELAYED RELEASE ORAL
Qty: 180 TABLET | Refills: 1 | Status: SHIPPED | OUTPATIENT
Start: 2024-09-05 | End: 2025-03-04

## 2024-09-05 NOTE — PROGRESS NOTES
Subjective   Patient ID: Mark Dao is a 39 y.o. male who presents for New Patient Visit and Med Refill (Requesting pantoprazole- was given this while in the hospital for his leg surgery 6/25/24).  History of tibia fang, with infection and synovitis of the left knee antreriorly  Had another surgery for infection and filled tib with cement   He is considering TENS therapy for the knee as well as continued PT  We extensively discussed his history of knee pain and specialist referrals.    GERD  On Protonix for 28 days  Doing well on this, needs refill  Recommend 40mg  1month then 20mg daily maintenance dose  Return if symptoms return.    This is a face to face for Home PT for TENS unit   Patient has history explained above, difficulty going to appts and leaving the home due to ambulatory dysfunction        Vitals:    09/05/24 1020   BP: 120/88   Pulse: 97   Temp: 36.6 °C (97.8 °F)   SpO2: 98%       Review of Systems    Objective   Physical Exam    Assessment/Plan   Problem List Items Addressed This Visit       Closed left ankle fracture, sequela    Relevant Orders    Referral to Physical Therapy    Other chronic osteomyelitis, left tibia and fibula (Multi) - Primary    Relevant Medications    pantoprazole (ProtoNix) 20 mg EC tablet     Other Visit Diagnoses       Synovitis of left knee        Relevant Orders    Referral to Physical Therapy                 Thank you for coming in today, please call my office if you have any concerns or questions.     Tyson GANT, CNP

## 2024-09-05 NOTE — PATIENT INSTRUCTIONS
Plan   Advised of findings, cont with healthy diet - restart miralax if not having daily soft bm - recheck if any issues . Instructed to call if problem worsens or does not improve within the next 24 hours otherwise, Return if symptoms worsen or fail to improve..          PT for the knee, TENS therapy  Protonix refill  30 days 40mg then go to 20mg daily    Cut back on smoking, pop intake  More water    Thank you for coming in today, if any questions or concerns arise, please call my office.   ALFREDA Joaquin-CNP

## 2024-09-12 ENCOUNTER — DOCUMENTATION (OUTPATIENT)
Dept: HOME HEALTH SERVICES | Facility: HOME HEALTH | Age: 39
End: 2024-09-12
Payer: MEDICAID

## 2024-09-12 ENCOUNTER — HOME HEALTH ADMISSION (OUTPATIENT)
Dept: HOME HEALTH SERVICES | Facility: HOME HEALTH | Age: 39
End: 2024-09-12
Payer: MEDICAID

## 2024-09-12 NOTE — HH CARE COORDINATION
Home Care received a Referral for Physical Therapy. We have processed the referral for a Start of Care on 09/13-09/14.     If you have any questions or concerns, please feel free to contact us at [phone]. Follow the prompts, enter your five digit zip code, and you will be directed to your care team on [facility].

## 2024-09-16 ENCOUNTER — HOME CARE VISIT (OUTPATIENT)
Dept: HOME HEALTH SERVICES | Facility: HOME HEALTH | Age: 39
End: 2024-09-16
Payer: MEDICAID

## 2024-09-16 VITALS — SYSTOLIC BLOOD PRESSURE: 128 MMHG | RESPIRATION RATE: 18 BRPM | DIASTOLIC BLOOD PRESSURE: 80 MMHG | HEART RATE: 72 BPM

## 2024-09-16 PROCEDURE — G0151 HHCP-SERV OF PT,EA 15 MIN: HCPCS

## 2024-09-16 ASSESSMENT — ACTIVITIES OF DAILY LIVING (ADL)
ENTERING_EXITING_HOME: SUPERVISION
OASIS_M1830: 00
AMBULATION ASSISTANCE ON FLAT SURFACES: 1
AMBULATION_DISTANCE/DURATION_TOLERATED: 50 FT

## 2024-09-16 ASSESSMENT — ENCOUNTER SYMPTOMS
PAIN: 1
PERSON REPORTING PAIN: PATIENT

## 2024-12-12 ENCOUNTER — APPOINTMENT (OUTPATIENT)
Dept: PRIMARY CARE | Facility: CLINIC | Age: 39
End: 2024-12-12
Payer: MEDICAID

## 2024-12-26 ENCOUNTER — APPOINTMENT (OUTPATIENT)
Dept: PRIMARY CARE | Facility: CLINIC | Age: 39
End: 2024-12-26
Payer: MEDICAID

## 2024-12-26 VITALS
WEIGHT: 255.4 LBS | OXYGEN SATURATION: 96 % | DIASTOLIC BLOOD PRESSURE: 74 MMHG | HEART RATE: 113 BPM | TEMPERATURE: 97.1 F | SYSTOLIC BLOOD PRESSURE: 110 MMHG | BODY MASS INDEX: 31.92 KG/M2

## 2024-12-26 DIAGNOSIS — S82.892S CLOSED LEFT ANKLE FRACTURE, SEQUELA: ICD-10-CM

## 2024-12-26 DIAGNOSIS — M65.962 SYNOVITIS OF LEFT KNEE: ICD-10-CM

## 2024-12-26 DIAGNOSIS — M86.662 OTHER CHRONIC OSTEOMYELITIS, LEFT TIBIA AND FIBULA: ICD-10-CM

## 2024-12-26 DIAGNOSIS — K21.9 GASTROESOPHAGEAL REFLUX DISEASE WITHOUT ESOPHAGITIS: ICD-10-CM

## 2024-12-26 DIAGNOSIS — M25.551 BILATERAL HIP PAIN: Primary | ICD-10-CM

## 2024-12-26 DIAGNOSIS — M25.552 BILATERAL HIP PAIN: Primary | ICD-10-CM

## 2024-12-26 PROCEDURE — 99213 OFFICE O/P EST LOW 20 MIN: CPT

## 2024-12-26 RX ORDER — PANTOPRAZOLE SODIUM 40 MG/1
40 TABLET, DELAYED RELEASE ORAL
Qty: 90 TABLET | Refills: 1 | Status: SHIPPED | OUTPATIENT
Start: 2024-12-26 | End: 2025-06-24

## 2024-12-26 NOTE — PROGRESS NOTES
Subjective   Patient ID: Mark Dao is a 39 y.o. male who presents for medication review (Would like to discuss heartburn med) and PT Treatment (Requesting a new order for additional orders for PT, he would like to do outpatient PT this time. ).  Knee pain  He is not benefitting or showing progress from home PT  Recommend outpatient PT to utilize more equipment  Order placed    GERD  Worsening if he does NOT take Protonix  Recommend restarting, GI referral placed.        Vitals:    12/26/24 1335   BP: 110/74   Pulse: (!) 113   Temp: 36.2 °C (97.1 °F)   SpO2: 96%       Review of Systems    Objective   Physical Exam    Assessment/Plan   Problem List Items Addressed This Visit       Closed left ankle fracture, sequela    Relevant Orders    Referral to Physical Therapy    Other chronic osteomyelitis, left tibia and fibula    Relevant Medications    pantoprazole (ProtoNix) 40 mg EC tablet    Other Relevant Orders    Referral to Physical Therapy     Other Visit Diagnoses       Bilateral hip pain    -  Primary    Relevant Orders    Referral to Physical Therapy    Synovitis of left knee        Relevant Orders    Referral to Physical Therapy    Gastroesophageal reflux disease without esophagitis        Relevant Orders    Referral to Gastroenterology                 Thank you for coming in today, please call my office if you have any concerns or questions.     Tyson GANT, CNP

## 2024-12-26 NOTE — PATIENT INSTRUCTIONS
See me in the summertime for physical and for blood work  See Dr. Yadav for GERD workup, possible EGD    Refill med  Physical therapy referral    Thank you for coming in today, if any questions or concerns arise, please call my office.   ALFREDA Joaquin-CNP

## 2025-01-06 ENCOUNTER — EVALUATION (OUTPATIENT)
Dept: PHYSICAL THERAPY | Facility: HOSPITAL | Age: 40
End: 2025-01-06
Payer: MEDICAID

## 2025-01-06 DIAGNOSIS — M25.552 BILATERAL HIP PAIN: Primary | ICD-10-CM

## 2025-01-06 DIAGNOSIS — M65.962 SYNOVITIS OF LEFT KNEE: ICD-10-CM

## 2025-01-06 DIAGNOSIS — M25.551 BILATERAL HIP PAIN: Primary | ICD-10-CM

## 2025-01-06 DIAGNOSIS — M86.662 OTHER CHRONIC OSTEOMYELITIS, LEFT TIBIA AND FIBULA: ICD-10-CM

## 2025-01-06 DIAGNOSIS — S82.892S CLOSED LEFT ANKLE FRACTURE, SEQUELA: ICD-10-CM

## 2025-01-06 PROCEDURE — 97162 PT EVAL MOD COMPLEX 30 MIN: CPT | Mod: GP | Performed by: PHYSICAL THERAPIST

## 2025-01-06 ASSESSMENT — PAIN - FUNCTIONAL ASSESSMENT: PAIN_FUNCTIONAL_ASSESSMENT: 0-10

## 2025-01-06 ASSESSMENT — ENCOUNTER SYMPTOMS
LOSS OF SENSATION IN FEET: 1
DEPRESSION: 0
OCCASIONAL FEELINGS OF UNSTEADINESS: 1

## 2025-01-06 ASSESSMENT — PAIN DESCRIPTION - DESCRIPTORS: DESCRIPTORS: ACHING;DULL

## 2025-01-06 ASSESSMENT — PAIN SCALES - GENERAL: PAINLEVEL_OUTOF10: 4

## 2025-01-06 NOTE — PROGRESS NOTES
Physical Therapy  Physical Therapy Orthopedic Evaluation    Patient Name: Mark Dao  MRN: [id number]  Today's Date: 1/6/2025  Time Calculation  Start Time: 1356  Stop Time: 1430  Time Calculation (min): 34 min (arrived late)    Today's Charges  PT Evaluation Time Entry  PT Evaluation (Moderate) Time Entry: 28  PT Therapeutic Procedures Time Entry  Therapeutic Exercise Time Entry: 6     Insurance:  Visit number: 1 of 8  Authorization info: no auth required (30 visits/year)  Payor: [employer] / Plan: [employer] MEDICAID / Product Type: *No Product type* /     Current Problem  Problem List Items Addressed This Visit             ICD-10-CM    Closed left ankle fracture, sequela S82.892S    Relevant Orders    Follow Up In Physical Therapy    Other chronic osteomyelitis, left tibia and fibula M86.662    Relevant Orders    Follow Up In Physical Therapy    Bilateral hip pain - Primary M25.551, M25.552    Relevant Orders    Follow Up In Physical Therapy     Other Visit Diagnoses         Codes    Synovitis of left knee     M65.962    Relevant Orders    Follow Up In Physical Therapy          1. Bilateral hip pain  Referral to Physical Therapy    Follow Up In Physical Therapy      2. Other chronic osteomyelitis, left tibia and fibula  Referral to Physical Therapy    Follow Up In Physical Therapy      3. Closed left ankle fracture, sequela  Referral to Physical Therapy    Follow Up In Physical Therapy      4. Synovitis of left knee  Referral to Physical Therapy    Follow Up In Physical Therapy          General:  General  Reason for Referral: bilat hip pain  Referred By: Gab MADRIGAL      Precautions:   Precautions  STEADI Fall Risk Score (The score of 4 or more indicates an increased risk of falling): 9  LE Weight Bearing Status: Weight Bearing as Tolerated  Medical Precautions: Fall precautions    Medical History Form: Reviewed (scanned into chart)    Subjective:   Subjective   Chief Complaint:  Patient is a 39 year old male who presents to clinic with bilateral hip pain. Patient has prior medical history including: chronic osteomyelitis left tibia/fibula, left lower extremity pain, closed left ankle fracture. Patient was receiving home health physical therapy from 6/2024-9/2024 post Saucerization left tibia, Removal deep implant left tibia, Injection of intramedullary orthopedic bone cement void filler, Excisional debridement of skin subcutaneous tissue and fascia less than 20 cm² distal tibia, Excisional debridement of skin and subcutaneous tissue less than 20 cm² proximal tibia, Complex closure 5 cm in length distal right tibia requiring the undermining of skin flaps and tissue advancement on 6/26/2024.  Onset Date: 12/26/2024  ASPEN: Chronic    Current Condition:   Same    Pain:  Pain Assessment: 0-10  0-10 (Numeric) Pain Score: 4  Pain Type: Chronic pain  Pain Location: Hip (knee)  Pain Orientation: Left  Pain Descriptors: Aching, Dull  Highest: 9/10 pain  Lowest: 4/10 pain  Aggravating Factors:  Standing and Walking  Relieving Factors:  Rest    Relevant Information (PMH & Previous Tests/Imaging):   Left tib/fib xray 7/12/2024:  Remote posttraumatic changes of the distal tibia and fibula. There is intramedullary nail explantation of the tibia with interval placement of bone cement. There is no hardware complication. The soft tissues are unremarkable. There is bridging callus visualized.    Previous Interventions/Treatments: Physical Therapy    Prior Level of Function (PLOF)  Patient previously independent with all ADLs  Exercise/Physical Activity: stationary bike, walking, knee range of motion exercises  Work/School: unemployed  Hobbies: dirt bike riding    Patients Living Environment: Reviewed and no concern    Primary Language: English    Patient's Goal(s) for Therapy: ride his dirt bike, walk without a cane    Red Flags: Do you have any of the following? No  Fever/chills, unexplained weight changes,  dizziness/fainting, unexplained change in bowel or bladder functions, unexplained malaise or muscle weakness, night pain/sweats, numbness or tingling    Objective:  Objective     HIP    Functional Rating Scale  LEFS   /80: 26    Hip PROM  R hip flexion: (125°): WFL  L hip flexion: (125°): WFL  R hip abduction: (45°): WFL  L hip abduction: (45°): WFL  R hip ER: (45°): WFL  L hip ER: (45°): WFL  R hip IR: (45°): WFL  L hip IR: (45°): WFL    Specific Lower Extremity MMT  R Iliopsoas: (5/5): 4/5  L Iliopsoas: (5/5): 4/5  L Gluteals (sidelying): (5/5): 3+/5    Knee PROM  R knee flexion: (140°): WFL  L knee flexion: (140°): 0-5-130  R knee extension: (0°): WFL    Knee MMT  R knee flexion: (5/5): 5/5  L knee flexion: (5/5): 4+/5  R knee extension: (5/5): 5/5  L knee extension: (5/5): 3-/5       Functional Screening    Posture: shoulder rounded forward    Lower Extremity Functional Movements  Transfers: Modified Independent  Gait:  antalgic gait with wide base of support, decreased hip/knee flexion during swing phase  Assistive Device: cane  DL Squat: required use of bilateral upper extremities and transfers his weight to his right lower extremity   Balance  Not tested     Palpation: increased edema left knee-    Joint Mobility: hypomobile knee    Flexibility: impaired hamstring flexibility      Special Tests                Anterior Drawer Test: -   Posterior Drawer Test: -   Valgus Stress Test: -   Varus Stress Test: -  Meniscus   Conner Test: -    Patella   Apprehension Test: -    Outcome Measures:  Other Measures  Lower Extremity Funtional Score (LEFS): 26/80     Treatment Performed:  Therapeutic Exercise  Therapeutic Exercise Performed: Yes    EDUCATION:   Individual(s) Educated: patient   Education Provided: Home exercise program, plan of care, activity modifications, pain management, and injury pathology  Handout(s) Provided: Scanned into chart  Home Program: Access Code: [email or web address]  Risk and Benefits Discussed with  Patient/Caregiver/Other: Yes   Patient/Caregiver Demonstrated Understanding: Yes   Plan of Care Discussed and Agreed Upon: Yes   Patient Response to Education: Patient/Caregiver verbalized understanding of information, Patient/Caregiver performed return demonstration of exercises/activities, and Patient/Caregiver asked appropriate questions    Assessment: Patient presents with impaired strength, range of motion/flexibility, gait, balance, and mobility resulting in limited participation in pain-free ADLs and inability to perform at their prior level of function. Skilled PT warranted to address the above stated impairments, so the patient can perform FA's without increased pain or difficulty.    PT Assessment Results: Decreased strength, Decreased range of motion, Impaired balance, Decreased mobility, Pain  Rehab Prognosis: Good  Evaluation/Treatment Tolerance: Patient tolerated treatment well    Complexity: moderate    Plan:  Treatment/Interventions: Education/ Instruction, Gait training, Manual therapy, Neuromuscular re-education, Therapeutic activities, Therapeutic exercises  PT Plan: Skilled PT  PT Frequency: 2 times per week  Duration: 4 weeks  Onset Date: 12/26/24  Certification Period Start Date: 01/07/25  Certification Period End Date: 02/04/25  Number of Treatments Authorized: 1 of 8  Rehab Potential: Good  Plan of Care Agreement: Patient    Goals: Set and discussed today  Active       PT Problem       Patient will demonstrate improved hamstring flexibility in bilateral lower extremities WFL.       Start:  01/06/25    Expected End:  02/03/25            Patient will maintain Tandem Stance for at least 30 sec with no upper extremity support.       Start:  01/06/25    Expected End:  02/03/25            Patient will ambulate with normal gait pattern with no device community distances.       Start:  01/06/25    Expected End:  02/03/25            Patient will ascend and descend 1 flight of stairs with rail modified  independent and reciprocal pattern.       Start:  01/06/25    Expected End:  02/03/25            Patient will achieve bilateral knee ROM of  0-130 degrees        Start:  01/06/25    Expected End:  02/03/25            Patient will achieve left hip flexion strength of at least 4+/5       Start:  01/06/25    Expected End:  02/03/25            Patient will achieve left hip abduction strength of at least 4+/5       Start:  01/06/25    Expected End:  02/03/25            Patient will achieve left knee extension strength of at least 4+/5       Start:  01/06/25    Expected End:  02/03/25            Patient will demonstrate independence in home program for support of progression       Start:  01/06/25    Expected End:  02/03/25            Patient will report pain of no more than 2/10 demonstrating a reduction of overall pain       Start:  01/06/25    Expected End:  02/03/25            Patient will show a significant change in LEFS (26 to 35) patient reported outcome tool to demonstrate subjective imporovement       Start:  01/06/25    Expected End:  02/03/25                Plan of care was developed with input and agreement by the patient    Ambulatory Screenings Summary       Screening  Frequency  Date Last Completed   Spiritual and Cultural Beliefs   Screening each visit or episode of care 1/6/2025   Falls Risk Screening every ambulatory visit 1/6/2025  2:08 PM   Pain Screening annually at primary care visit     Domestic Violence screening annually at primary care visit 1/6/2025   Depression Screening annually in the primary care setting    Suicide Risk Screening annually in the primary care setting 6/25/2024   Nutrition and Food Insecurity   Screening at least annually at primary care visit     Key Learner annually in the primary care setting 1/6/2025   Drug Screen  9/5/2024 10:20 AM   Alcohol Screen  9/5/2024 10:20 AM   Advance Directive  9/5/2024       Bonifacio Nicole, PT

## 2025-01-08 ENCOUNTER — TREATMENT (OUTPATIENT)
Dept: PHYSICAL THERAPY | Facility: HOSPITAL | Age: 40
End: 2025-01-08
Payer: MEDICAID

## 2025-01-08 DIAGNOSIS — M65.962 SYNOVITIS OF LEFT KNEE: ICD-10-CM

## 2025-01-08 DIAGNOSIS — M25.552 BILATERAL HIP PAIN: ICD-10-CM

## 2025-01-08 DIAGNOSIS — M86.662 OTHER CHRONIC OSTEOMYELITIS, LEFT TIBIA AND FIBULA: ICD-10-CM

## 2025-01-08 DIAGNOSIS — M25.551 BILATERAL HIP PAIN: ICD-10-CM

## 2025-01-08 DIAGNOSIS — S82.892S CLOSED LEFT ANKLE FRACTURE, SEQUELA: ICD-10-CM

## 2025-01-08 PROCEDURE — 97110 THERAPEUTIC EXERCISES: CPT | Mod: GP,CQ

## 2025-01-08 PROCEDURE — 97116 GAIT TRAINING THERAPY: CPT | Mod: GP,CQ

## 2025-01-08 ASSESSMENT — PAIN SCALES - GENERAL: PAINLEVEL_OUTOF10: 4

## 2025-01-08 ASSESSMENT — PAIN - FUNCTIONAL ASSESSMENT: PAIN_FUNCTIONAL_ASSESSMENT: 0-10

## 2025-01-08 NOTE — PROGRESS NOTES
"  Physical Therapy Treatment    Patient Name: Mark Dao  MRN: [id number]  Today's Date: 1/8/2025  Time Calculation  Start Time: 1303  Stop Time: 1403  Time Calculation (min): 60 min        PT Therapeutic Procedures Time Entry  Neuromuscular Re-Education Time Entry: 4  Therapeutic Exercise Time Entry: 48  Gait Training Time Entry: 8                Current Problem  1. Bilateral hip pain  Follow Up In Physical Therapy      2. Other chronic osteomyelitis, left tibia and fibula  Follow Up In Physical Therapy      3. Closed left ankle fracture, sequela  Follow Up In Physical Therapy      4. Synovitis of left knee  Follow Up In Physical Therapy          General  Reason for Referral: bilat hip pain  Referred By: Gab MADRIGAL    Subjective   Current Condition:   Same  Patient reports no problems with HEP issued. States he consistently stretches his L knee to try to get it straight.     Performing HEP?: Yes    Precautions  Precautions  LE Weight Bearing Status: Weight Bearing as Tolerated  Medical Precautions: Fall precautions  Pain  Pain Assessment: 0-10  0-10 (Numeric) Pain Score: 4  Pain Type: Chronic pain  Pain Location: Knee  Pain Orientation: Left    Objective         Treatments:    Therapeutic Exercise  Therapeutic Exercise Performed: Yes  Therapeutic Exercise Activity 1: QS x10 5\"  Therapeutic Exercise Activity 2: SLR x10  Therapeutic Exercise Activity 3: Bridge with Hip ABD 2x10  Therapeutic Exercise Activity 4: LAQ x5 L  Therapeutic Exercise Activity 5: Nu Step Level 4 x5'  Therapeutic Exercise Activity 6: split stance rocking 1'  Therapeutic Exercise Activity 7: TKE Black L x20  Therapeutic Exercise Activity 8: HR x20  Therapeutic Exercise Activity 9: split stance chops 7# x10 R/L      Ambulation/Gait Training 1  Surface 1: Level tile  Device 1: Single point cane  Quality of Gait 1: Diminished heel strike, Wide base of support  Comments/Distance (ft) 1: 60x1, cuing to correct leaning to the R, with good follow " "through demonstrated         EDUCATION:   Individual(s) Educated: Patient  Education Provided: HEP, issued Black T-band, LLLD stretching for knee extension  Risk and Benefits Discussed with Patient/Caregiver/Other: Yes   Patient/Caregiver Demonstrated Understanding: Yes   Patient Response to Education: Patient/Caregiver verbalized understanding of information    Assessment: Pt able to perform LAQ slowly, with difficulty controlling eccentric phase. Pt states if the knee flexes too quickly in the seated position it is very painful. Pt c/o \"burning\" in the bilateral hips with prolonged WB, required seated rest period to recover. Pt demonstrated good accuracy with tapping objects on floor, without visual cuing. Pt required cuing to increase heel strike and push off with the L LE during ambulation, with fair follow through demonstrated.   PT Assessment  PT Assessment Results: Decreased strength, Decreased range of motion, Impaired balance, Decreased mobility, Pain  Rehab Prognosis: Good    Plan: Continue to progress current POC as tolerated to facilitate ability to perform functional activities.   OP PT Plan  Treatment/Interventions: Education/ Instruction, Gait training, Manual therapy, Neuromuscular re-education, Therapeutic activities, Therapeutic exercises  PT Plan: Skilled PT  PT Frequency: 2 times per week  Duration: 4 weeks  Onset Date: 12/26/24  Certification Period Start Date: 01/07/25  Certification Period End Date: 02/04/25  Number of Treatments Authorized: 2 of 8  Rehab Potential: Good  Plan of Care Agreement: Patient    Goals:  Active       PT Problem       Patient will demonstrate improved hamstring flexibility in bilateral lower extremities WFL.       Start:  01/06/25    Expected End:  02/03/25            Patient will maintain Tandem Stance for at least 30 sec with no upper extremity support.       Start:  01/06/25    Expected End:  02/03/25            Patient will ambulate with normal gait pattern with " no device community distances.       Start:  01/06/25    Expected End:  02/03/25            Patient will ascend and descend 1 flight of stairs with rail modified independent and reciprocal pattern.       Start:  01/06/25    Expected End:  02/03/25            Patient will achieve bilateral knee ROM of  0-130 degrees        Start:  01/06/25    Expected End:  02/03/25            Patient will achieve left hip flexion strength of at least 4+/5       Start:  01/06/25    Expected End:  02/03/25            Patient will achieve left hip abduction strength of at least 4+/5       Start:  01/06/25    Expected End:  02/03/25            Patient will achieve left knee extension strength of at least 4+/5       Start:  01/06/25    Expected End:  02/03/25            Patient will demonstrate independence in home program for support of progression       Start:  01/06/25    Expected End:  02/03/25            Patient will report pain of no more than 2/10 demonstrating a reduction of overall pain       Start:  01/06/25    Expected End:  02/03/25            Patient will show a significant change in LEFS (26 to 35) patient reported outcome tool to demonstrate subjective imporovement       Start:  01/06/25    Expected End:  02/03/25                 Ashutosh Montanez, PTA

## 2025-01-13 ENCOUNTER — TREATMENT (OUTPATIENT)
Dept: PHYSICAL THERAPY | Facility: HOSPITAL | Age: 40
End: 2025-01-13
Payer: MEDICAID

## 2025-01-13 DIAGNOSIS — M25.551 BILATERAL HIP PAIN: ICD-10-CM

## 2025-01-13 DIAGNOSIS — M65.962 SYNOVITIS OF LEFT KNEE: ICD-10-CM

## 2025-01-13 DIAGNOSIS — S82.892S CLOSED LEFT ANKLE FRACTURE, SEQUELA: ICD-10-CM

## 2025-01-13 DIAGNOSIS — M86.662 OTHER CHRONIC OSTEOMYELITIS, LEFT TIBIA AND FIBULA: ICD-10-CM

## 2025-01-13 DIAGNOSIS — M25.552 BILATERAL HIP PAIN: ICD-10-CM

## 2025-01-13 PROCEDURE — 97110 THERAPEUTIC EXERCISES: CPT | Mod: GP,CQ

## 2025-01-13 ASSESSMENT — PAIN - FUNCTIONAL ASSESSMENT: PAIN_FUNCTIONAL_ASSESSMENT: 0-10

## 2025-01-13 ASSESSMENT — PAIN SCALES - GENERAL: PAINLEVEL_OUTOF10: 4

## 2025-01-13 NOTE — PROGRESS NOTES
"  Physical Therapy Treatment    Patient Name: Mark Dao  MRN: [id number]  Today's Date: 1/13/2025  Time Calculation  Start Time: 1258  Stop Time: 1342  Time Calculation (min): 44 min        PT Therapeutic Procedures Time Entry  Neuromuscular Re-Education Time Entry: 4  Therapeutic Exercise Time Entry: 40                Current Problem  1. Bilateral hip pain  Follow Up In Physical Therapy      2. Other chronic osteomyelitis, left tibia and fibula  Follow Up In Physical Therapy      3. Closed left ankle fracture, sequela  Follow Up In Physical Therapy      4. Synovitis of left knee  Follow Up In Physical Therapy          General  Reason for Referral: bilat hip pain  Referred By: Gab MADRIGAL    Subjective   Current Condition:   Same  Patient reports no adverse response to last PT session. Pt states he has been walking more without his cane at home.     Performing HEP?: Partially     Precautions  Precautions  LE Weight Bearing Status: Weight Bearing as Tolerated  Medical Precautions: Fall precautions  Pain  Pain Assessment: 0-10  0-10 (Numeric) Pain Score: 4  Pain Type: Chronic pain  Pain Location: Knee  Pain Orientation: Left    Objective         Treatments:    Therapeutic Exercise  Therapeutic Exercise Performed: Yes  Therapeutic Exercise Activity 1: QS x10 5\"  Therapeutic Exercise Activity 2: SLR x10  Therapeutic Exercise Activity 3: Bridge with Hip ABD 2x10  Therapeutic Exercise Activity 5: Nu Step Level 6 x6' Seat 11  Therapeutic Exercise Activity 6: split stance rocking 1'  Therapeutic Exercise Activity 7: TKE Black L x20  Therapeutic Exercise Activity 8: HR x20  Therapeutic Exercise Activity 10: Split stance Rows Double Black x20 R/L  Therapeutic Exercise Activity 11: [employer] pew 1'  Therapeutic Exercise Activity 12: Standing Hip Extension L x10    Balance/Neuromuscular Re-Education  Balance/Neuromuscular Re-Education Activity Performed: Yes  Balance/Neuromuscular Re-Education Activity 1: tapping objects on " floor without visual cuing for proprioception 1'                     EDUCATION:   Individual(s) Educated: Patient  Education Provided: HEP   Risk and Benefits Discussed with Patient/Caregiver/Other: Yes   Patient/Caregiver Demonstrated Understanding: Yes   Patient Response to Education: Patient/Caregiver verbalized understanding of information    Assessment: Unable to perform LAQ today d/t shaking of the L LE and pain with eccentric phase on the first repetition. Attempted standing hip extension with the R LE, however, stated the L knee felt too unstable. Pt was able to maintain balance with ant/post weight shifts without LOB.   PT Assessment  PT Assessment Results: Decreased strength, Decreased range of motion, Impaired balance, Decreased mobility, Pain  Rehab Prognosis: Good    Plan: Continue to progress current POC as tolerated to facilitate ability to perform functional activities.   OP PT Plan  Treatment/Interventions: Education/ Instruction, Gait training, Manual therapy, Neuromuscular re-education, Therapeutic activities, Therapeutic exercises  PT Plan: Skilled PT  PT Frequency: 2 times per week  Duration: 4 weeks  Onset Date: 12/26/24  Certification Period Start Date: 01/07/25  Certification Period End Date: 02/04/25  Number of Treatments Authorized: 3 of 8  Rehab Potential: Good  Plan of Care Agreement: Patient    Goals:  Active       PT Problem       Patient will demonstrate improved hamstring flexibility in bilateral lower extremities WFL.       Start:  01/06/25    Expected End:  02/03/25            Patient will maintain Tandem Stance for at least 30 sec with no upper extremity support.       Start:  01/06/25    Expected End:  02/03/25            Patient will ambulate with normal gait pattern with no device community distances.       Start:  01/06/25    Expected End:  02/03/25            Patient will ascend and descend 1 flight of stairs with rail modified independent and reciprocal pattern.       Start:   01/06/25    Expected End:  02/03/25            Patient will achieve bilateral knee ROM of  0-130 degrees        Start:  01/06/25    Expected End:  02/03/25            Patient will achieve left hip flexion strength of at least 4+/5       Start:  01/06/25    Expected End:  02/03/25            Patient will achieve left hip abduction strength of at least 4+/5       Start:  01/06/25    Expected End:  02/03/25            Patient will achieve left knee extension strength of at least 4+/5       Start:  01/06/25    Expected End:  02/03/25            Patient will demonstrate independence in home program for support of progression       Start:  01/06/25    Expected End:  02/03/25            Patient will report pain of no more than 2/10 demonstrating a reduction of overall pain       Start:  01/06/25    Expected End:  02/03/25            Patient will show a significant change in LEFS (26 to 35) patient reported outcome tool to demonstrate subjective imporovement       Start:  01/06/25    Expected End:  02/03/25                 Ashutosh Montanez, PTA

## 2025-01-16 ENCOUNTER — TREATMENT (OUTPATIENT)
Dept: PHYSICAL THERAPY | Facility: HOSPITAL | Age: 40
End: 2025-01-16
Payer: MEDICAID

## 2025-01-16 DIAGNOSIS — M86.662 OTHER CHRONIC OSTEOMYELITIS, LEFT TIBIA AND FIBULA: ICD-10-CM

## 2025-01-16 DIAGNOSIS — M65.962 SYNOVITIS OF LEFT KNEE: ICD-10-CM

## 2025-01-16 DIAGNOSIS — M25.551 BILATERAL HIP PAIN: ICD-10-CM

## 2025-01-16 DIAGNOSIS — S82.892S CLOSED LEFT ANKLE FRACTURE, SEQUELA: ICD-10-CM

## 2025-01-16 DIAGNOSIS — M25.552 BILATERAL HIP PAIN: ICD-10-CM

## 2025-01-16 PROCEDURE — 97110 THERAPEUTIC EXERCISES: CPT | Mod: GP,CQ

## 2025-01-16 ASSESSMENT — PAIN - FUNCTIONAL ASSESSMENT: PAIN_FUNCTIONAL_ASSESSMENT: 0-10

## 2025-01-16 ASSESSMENT — PAIN SCALES - GENERAL: PAINLEVEL_OUTOF10: 4

## 2025-01-16 NOTE — PROGRESS NOTES
"  Physical Therapy Treatment    Patient Name: Mark Dao  MRN: [id number]  Today's Date: 1/16/2025  Time Calculation  Start Time: 1345  Stop Time: 1425  Time Calculation (min): 40 min        PT Therapeutic Procedures Time Entry  Therapeutic Exercise Time Entry: 40                Current Problem  1. Bilateral hip pain  Follow Up In Physical Therapy      2. Other chronic osteomyelitis, left tibia and fibula  Follow Up In Physical Therapy      3. Closed left ankle fracture, sequela  Follow Up In Physical Therapy      4. Synovitis of left knee  Follow Up In Physical Therapy          General  Reason for Referral: bilat hip pain  Referred By: Gab MADRIGAL    Subjective   Current Condition:   Better  Patient reports     Performing HEP?: Yes    Precautions  Precautions  LE Weight Bearing Status: Weight Bearing as Tolerated  Medical Precautions: Fall precautions  Pain  Pain Assessment: 0-10  0-10 (Numeric) Pain Score: 4  Pain Location: Knee  Pain Orientation: Left    Objective         Treatments:    Therapeutic Exercise  Therapeutic Exercise Performed: Yes  Therapeutic Exercise Activity 1: QS x10 5\"  Therapeutic Exercise Activity 2: SLR x20  Therapeutic Exercise Activity 3: Bridge with Hip ABD 2x10  Therapeutic Exercise Activity 5: Nu Step Level 6 x6' Seat 12  Therapeutic Exercise Activity 6: split stance rocking 1'  Therapeutic Exercise Activity 7: TKE Black L x20  Therapeutic Exercise Activity 8: HR x20  Therapeutic Exercise Activity 10: Split stance Rows Double Black x20 R/L  Therapeutic Exercise Activity 11: Sabianism pew 1'  Therapeutic Exercise Activity 12: Standing Hip Extension L x10  Therapeutic Exercise Activity 13: Pallof press double black x20 R/L              EDUCATION:   Individual(s) Educated: Patient  Education Provided: HEP   Risk and Benefits Discussed with Patient/Caregiver/Other: Yes   Patient/Caregiver Demonstrated Understanding: Yes   Patient Response to Education: Patient/Caregiver verbalized " understanding of information    Assessment: Pt was able to perform 20 straight SLR without quad lag today. Pt requires cuing to increase push off with the L foot to increase knee flexion during gait cycle. Attempted STS from elevated surface, however, too difficult and painful per pt.   PT Assessment  PT Assessment Results: Decreased strength, Decreased range of motion, Impaired balance, Decreased mobility, Pain  Rehab Prognosis: Good    Plan: Continue to progress current POC as tolerated to facilitate ability to perform functional activities.   OP PT Plan  Treatment/Interventions: Education/ Instruction, Gait training, Manual therapy, Neuromuscular re-education, Therapeutic activities, Therapeutic exercises  PT Plan: Skilled PT  PT Frequency: 2 times per week  Duration: 4 weeks  Onset Date: 12/26/24  Certification Period Start Date: 01/07/25  Certification Period End Date: 02/04/25  Number of Treatments Authorized: 3 of 8  Rehab Potential: Good  Plan of Care Agreement: Patient    Goals:  Active       PT Problem       Patient will demonstrate improved hamstring flexibility in bilateral lower extremities WFL.       Start:  01/06/25    Expected End:  02/03/25            Patient will maintain Tandem Stance for at least 30 sec with no upper extremity support.       Start:  01/06/25    Expected End:  02/03/25            Patient will ambulate with normal gait pattern with no device community distances.       Start:  01/06/25    Expected End:  02/03/25            Patient will ascend and descend 1 flight of stairs with rail modified independent and reciprocal pattern.       Start:  01/06/25    Expected End:  02/03/25            Patient will achieve bilateral knee ROM of  0-130 degrees        Start:  01/06/25    Expected End:  02/03/25            Patient will achieve left hip flexion strength of at least 4+/5       Start:  01/06/25    Expected End:  02/03/25            Patient will achieve left hip abduction strength of at  least 4+/5       Start:  01/06/25    Expected End:  02/03/25            Patient will achieve left knee extension strength of at least 4+/5       Start:  01/06/25    Expected End:  02/03/25            Patient will demonstrate independence in home program for support of progression       Start:  01/06/25    Expected End:  02/03/25            Patient will report pain of no more than 2/10 demonstrating a reduction of overall pain       Start:  01/06/25    Expected End:  02/03/25            Patient will show a significant change in LEFS (26 to 35) patient reported outcome tool to demonstrate subjective imporovement       Start:  01/06/25    Expected End:  02/03/25                 Ashutosh Montanez, PTA

## 2025-01-20 ENCOUNTER — TREATMENT (OUTPATIENT)
Dept: PHYSICAL THERAPY | Facility: HOSPITAL | Age: 40
End: 2025-01-20
Payer: MEDICAID

## 2025-01-20 DIAGNOSIS — M25.552 BILATERAL HIP PAIN: ICD-10-CM

## 2025-01-20 DIAGNOSIS — S82.892S CLOSED LEFT ANKLE FRACTURE, SEQUELA: ICD-10-CM

## 2025-01-20 DIAGNOSIS — M65.962 SYNOVITIS OF LEFT KNEE: ICD-10-CM

## 2025-01-20 DIAGNOSIS — M25.551 BILATERAL HIP PAIN: ICD-10-CM

## 2025-01-20 DIAGNOSIS — M86.662 OTHER CHRONIC OSTEOMYELITIS, LEFT TIBIA AND FIBULA: ICD-10-CM

## 2025-01-20 PROCEDURE — 97110 THERAPEUTIC EXERCISES: CPT | Mod: GP,CQ

## 2025-01-20 PROCEDURE — 97112 NEUROMUSCULAR REEDUCATION: CPT | Mod: GP,CQ

## 2025-01-20 NOTE — PROGRESS NOTES
"  Physical Therapy Treatment    Patient Name: Mark Dao  MRN: [id number]  Today's Date: 1/20/2025  Time Calculation  Start Time: 1559  Stop Time: 1658  Time Calculation (min): 59 min        PT Therapeutic Procedures Time Entry  Neuromuscular Re-Education Time Entry: 16  Therapeutic Exercise Time Entry: 38  Gait Training Time Entry: 5                Current Problem  1. Bilateral hip pain  Follow Up In Physical Therapy      2. Other chronic osteomyelitis, left tibia and fibula  Follow Up In Physical Therapy      3. Closed left ankle fracture, sequela  Follow Up In Physical Therapy      4. Synovitis of left knee  Follow Up In Physical Therapy          General  Reason for Referral: bilat hip pain  Referred By: Gab MADRIGAL    Subjective   Current Condition:   Same  Patient reports his L knee doesn't want to bend when he tries to push off the L toe, which makes him walk with his leg stiff.     Performing HEP?: Yes    Precautions  Precautions  LE Weight Bearing Status: Weight Bearing as Tolerated  Medical Precautions: Fall precautions  Pain       Objective         Treatments:    Therapeutic Exercise  Therapeutic Exercise Performed: Yes  Therapeutic Exercise Activity 1: QS x10 5\"  Therapeutic Exercise Activity 2: SLR x20  Therapeutic Exercise Activity 3: Bridge with Hip ABD 2x10  Therapeutic Exercise Activity 4: Hamstring curls prone Red x20  Therapeutic Exercise Activity 5: Nu Step Level 6 x6' Seat 12  Therapeutic Exercise Activity 6: split stance rocking 1'  Therapeutic Exercise Activity 7: TKE Black L x20  Therapeutic Exercise Activity 8: HR x20  Therapeutic Exercise Activity 10: Split stance Rows Double Black x20 R/L  Therapeutic Exercise Activity 11: Alevism pew 1'  Therapeutic Exercise Activity 12: Standing Hip Extension L x10  Therapeutic Exercise Activity 13: Pallof press double black x20 R/L    Balance/Neuromuscular Re-Education  Balance/Neuromuscular Re-Education Activity Performed: Yes  Balance/Neuromuscular " "Re-Education Activity 1: Airex NBOS with head turns 30\"  Balance/Neuromuscular Re-Education Activity 2: Airex EC 30  Balance/Neuromuscular Re-Education Activity 3: NMES L Quads with SAQ @ 34mA, 10\" on, 20\" off x8' to              Ambulation/Gait Training  Ambulation/Gait Training Performed: Yes    Ambulation/Gait Training 1  Surface 1: Level tile  Device 1: Single point cane  Quality of Gait 1: Diminished heel strike, Wide base of support         EDUCATION:   Individual(s) Educated: Patient  Education Provided: HEP   Risk and Benefits Discussed with Patient/Caregiver/Other: Yes   Patient/Caregiver Demonstrated Understanding: Yes   Patient Response to Education: Patient/Caregiver verbalized understanding of information    Assessment: Pt was able to tolerate NMES initiated today with decreased discomfort noted with knee flexion/extension during SAQ. Pt was able to flex the L knee during swing through phase of gait after table exercises and NMES treatment.   PT Assessment  PT Assessment Results: Decreased strength, Decreased range of motion, Impaired balance, Decreased mobility, Pain  Rehab Prognosis: Good    Plan: Continue to progress current POC as tolerated to facilitate ability to perform functional activities.   OP PT Plan  Treatment/Interventions: Education/ Instruction, Gait training, Manual therapy, Neuromuscular re-education, Therapeutic activities, Therapeutic exercises  PT Plan: Skilled PT  PT Frequency: 2 times per week  Duration: 4 weeks  Onset Date: 12/26/24  Certification Period Start Date: 01/07/25  Certification Period End Date: 02/04/25  Number of Treatments Authorized: 5 of 8  Rehab Potential: Good  Plan of Care Agreement: Patient    Goals:  Active       PT Problem       Patient will demonstrate improved hamstring flexibility in bilateral lower extremities WFL.       Start:  01/06/25    Expected End:  02/03/25            Patient will maintain Tandem Stance for at least 30 sec with no upper extremity " support.       Start:  01/06/25    Expected End:  02/03/25            Patient will ambulate with normal gait pattern with no device community distances.       Start:  01/06/25    Expected End:  02/03/25            Patient will ascend and descend 1 flight of stairs with rail modified independent and reciprocal pattern.       Start:  01/06/25    Expected End:  02/03/25            Patient will achieve bilateral knee ROM of  0-130 degrees        Start:  01/06/25    Expected End:  02/03/25            Patient will achieve left hip flexion strength of at least 4+/5       Start:  01/06/25    Expected End:  02/03/25            Patient will achieve left hip abduction strength of at least 4+/5       Start:  01/06/25    Expected End:  02/03/25            Patient will achieve left knee extension strength of at least 4+/5       Start:  01/06/25    Expected End:  02/03/25            Patient will demonstrate independence in home program for support of progression       Start:  01/06/25    Expected End:  02/03/25            Patient will report pain of no more than 2/10 demonstrating a reduction of overall pain       Start:  01/06/25    Expected End:  02/03/25            Patient will show a significant change in LEFS (26 to 35) patient reported outcome tool to demonstrate subjective imporovement       Start:  01/06/25    Expected End:  02/03/25                 Ashutosh Montanez PTA

## 2025-01-22 ENCOUNTER — DOCUMENTATION (OUTPATIENT)
Dept: PHYSICAL THERAPY | Facility: HOSPITAL | Age: 40
End: 2025-01-22
Payer: MEDICAID

## 2025-01-23 NOTE — PROGRESS NOTES
Physical Therapy                 Therapy Communication Note    Patient Name: Mark Dao  MRN: [id number]  Department: [facility]  Room: Room/bed info not found  Today's Date: 1/23/2025     Discipline: Physical Therapy          Missed Visit Reason:  called and left message    Missed Time: No Show 4:45

## 2025-01-27 ENCOUNTER — TREATMENT (OUTPATIENT)
Dept: PHYSICAL THERAPY | Facility: HOSPITAL | Age: 40
End: 2025-01-27
Payer: MEDICAID

## 2025-01-27 DIAGNOSIS — M65.962 SYNOVITIS OF LEFT KNEE: ICD-10-CM

## 2025-01-27 DIAGNOSIS — M25.552 BILATERAL HIP PAIN: ICD-10-CM

## 2025-01-27 DIAGNOSIS — M86.662 OTHER CHRONIC OSTEOMYELITIS, LEFT TIBIA AND FIBULA: ICD-10-CM

## 2025-01-27 DIAGNOSIS — S82.892S CLOSED LEFT ANKLE FRACTURE, SEQUELA: ICD-10-CM

## 2025-01-27 DIAGNOSIS — M25.551 BILATERAL HIP PAIN: ICD-10-CM

## 2025-01-27 PROCEDURE — 97110 THERAPEUTIC EXERCISES: CPT | Mod: GP,CQ

## 2025-01-27 PROCEDURE — 97112 NEUROMUSCULAR REEDUCATION: CPT | Mod: GP,CQ

## 2025-01-27 ASSESSMENT — PAIN SCALES - GENERAL: PAINLEVEL_OUTOF10: 3

## 2025-01-27 ASSESSMENT — PAIN - FUNCTIONAL ASSESSMENT: PAIN_FUNCTIONAL_ASSESSMENT: 0-10

## 2025-01-27 NOTE — PROGRESS NOTES
"  Physical Therapy Treatment    Patient Name: Mark Dao  MRN: [id number]  Today's Date: 1/27/2025  Time Calculation  Start Time: 1549  Stop Time: 1645  Time Calculation (min): 56 min        PT Therapeutic Procedures Time Entry  Neuromuscular Re-Education Time Entry: 15  Therapeutic Exercise Time Entry: 41                Current Problem  1. Bilateral hip pain  Follow Up In Physical Therapy      2. Other chronic osteomyelitis, left tibia and fibula  Follow Up In Physical Therapy      3. Closed left ankle fracture, sequela  Follow Up In Physical Therapy      4. Synovitis of left knee  Follow Up In Physical Therapy          General  Reason for Referral: bilat hip pain  Referred By: Gab MADRIGAL    Subjective   Current Condition:   Better  Patient reports he had some soreness initially after last PT session, however, did not last long.l     Performing HEP?: Yes    Precautions  Precautions  LE Weight Bearing Status: Weight Bearing as Tolerated  Medical Precautions: Fall precautions  Pain  Pain Assessment: 0-10  0-10 (Numeric) Pain Score: 3  Pain Location: Knee  Pain Orientation: Left    Objective       Treatments:    Therapeutic Exercise  Therapeutic Exercise Performed: Yes  Therapeutic Exercise Activity 1: QS x10 5\"  Therapeutic Exercise Activity 2: SLR x20  Therapeutic Exercise Activity 3: Bridge with Hip ABD 2x10  Therapeutic Exercise Activity 4: Hamstring curls prone Red x20  Therapeutic Exercise Activity 5: Nu Step Level 6 x10' Seat 12  Therapeutic Exercise Activity 7: TKE Black L x20  Therapeutic Exercise Activity 8: HR x20  Therapeutic Exercise Activity 10: Split stance Rows Double Black x20 R/L  Therapeutic Exercise Activity 11: side stepping 30'x2  Therapeutic Exercise Activity 12: Standing Hip Extension L x10  Therapeutic Exercise Activity 13: Pallof press double black x20 R/L    Balance/Neuromuscular Re-Education  Balance/Neuromuscular Re-Education Activity Performed: Yes  Balance/Neuromuscular Re-Education " "Activity 1: Tandem 30\"  Balance/Neuromuscular Re-Education Activity 2: Retro walk 30' x2  Balance/Neuromuscular Re-Education Activity 3: NMES L Quads with SAQ @ 39mA, 10\" on, 20\" off x11' to                        Ambulation/Gait Training  Ambulation/Gait Training Performed: Yes    Ambulation/Gait Training 1  Surface 1: Level tile  Device 1: Single point cane  Quality of Gait 1: Diminished heel strike, Wide base of support         EDUCATION:   Individual(s) Educated: Patient  Education Provided: Reverse TKE  Risk and Benefits Discussed with Patient/Caregiver/Other: Yes   Patient/Caregiver Demonstrated Understanding: Yes   Patient Response to Education: Patient/Caregiver verbalized understanding of information    Assessment: Pt able to perform prone hamstring curls, however, states they are challenging during the eccentric phase with some pain. Pt was able to increase pace with retro ambulation after the first rep.   PT Assessment  PT Assessment Results: Decreased strength, Decreased range of motion, Impaired balance, Decreased mobility, Pain  Rehab Prognosis: Good    Plan: Continue to progress current POC as tolerated to facilitate ability to perform functional activities.   OP PT Plan  Treatment/Interventions: Education/ Instruction, Gait training, Manual therapy, Neuromuscular re-education, Therapeutic activities, Therapeutic exercises  PT Plan: Skilled PT  PT Frequency: 2 times per week  Duration: 4 weeks  Onset Date: 12/26/24  Certification Period Start Date: 01/07/25  Certification Period End Date: 02/04/25  Number of Treatments Authorized: 6 of 8  Rehab Potential: Good  Plan of Care Agreement: Patient    Goals:  Active       PT Problem       Patient will demonstrate improved hamstring flexibility in bilateral lower extremities WFL.       Start:  01/06/25    Expected End:  02/03/25            Patient will maintain Tandem Stance for at least 30 sec with no upper extremity support.       Start:  01/06/25    " Expected End:  02/03/25            Patient will ambulate with normal gait pattern with no device community distances.       Start:  01/06/25    Expected End:  02/03/25            Patient will ascend and descend 1 flight of stairs with rail modified independent and reciprocal pattern.       Start:  01/06/25    Expected End:  02/03/25            Patient will achieve bilateral knee ROM of  0-130 degrees        Start:  01/06/25    Expected End:  02/03/25            Patient will achieve left hip flexion strength of at least 4+/5       Start:  01/06/25    Expected End:  02/03/25            Patient will achieve left hip abduction strength of at least 4+/5       Start:  01/06/25    Expected End:  02/03/25            Patient will achieve left knee extension strength of at least 4+/5       Start:  01/06/25    Expected End:  02/03/25            Patient will demonstrate independence in home program for support of progression       Start:  01/06/25    Expected End:  02/03/25            Patient will report pain of no more than 2/10 demonstrating a reduction of overall pain       Start:  01/06/25    Expected End:  02/03/25            Patient will show a significant change in LEFS (26 to 35) patient reported outcome tool to demonstrate subjective imporovement       Start:  01/06/25    Expected End:  02/03/25                 Ashutosh Montanez, PTA

## 2025-01-29 ENCOUNTER — TREATMENT (OUTPATIENT)
Dept: PHYSICAL THERAPY | Facility: HOSPITAL | Age: 40
End: 2025-01-29
Payer: MEDICAID

## 2025-01-29 DIAGNOSIS — M86.662 OTHER CHRONIC OSTEOMYELITIS, LEFT TIBIA AND FIBULA: ICD-10-CM

## 2025-01-29 DIAGNOSIS — M25.551 BILATERAL HIP PAIN: ICD-10-CM

## 2025-01-29 DIAGNOSIS — M65.962 SYNOVITIS OF LEFT KNEE: ICD-10-CM

## 2025-01-29 DIAGNOSIS — M25.552 BILATERAL HIP PAIN: ICD-10-CM

## 2025-01-29 DIAGNOSIS — S82.892S CLOSED LEFT ANKLE FRACTURE, SEQUELA: ICD-10-CM

## 2025-01-29 PROCEDURE — 97110 THERAPEUTIC EXERCISES: CPT | Mod: GP,CQ

## 2025-01-29 PROCEDURE — 97112 NEUROMUSCULAR REEDUCATION: CPT | Mod: GP,CQ

## 2025-01-29 ASSESSMENT — PAIN - FUNCTIONAL ASSESSMENT: PAIN_FUNCTIONAL_ASSESSMENT: 0-10

## 2025-01-29 ASSESSMENT — PAIN SCALES - GENERAL: PAINLEVEL_OUTOF10: 3

## 2025-01-29 NOTE — PROGRESS NOTES
"  Physical Therapy Treatment    Patient Name: Mark Dao  MRN: [id number]  Today's Date: 1/29/2025  Time Calculation  Start Time: 1642  Stop Time: 1729  Time Calculation (min): 47 min        PT Therapeutic Procedures Time Entry  Neuromuscular Re-Education Time Entry: 15  Therapeutic Exercise Time Entry: 32                Current Problem  1. Bilateral hip pain  Follow Up In Physical Therapy      2. Other chronic osteomyelitis, left tibia and fibula  Follow Up In Physical Therapy      3. Closed left ankle fracture, sequela  Follow Up In Physical Therapy      4. Synovitis of left knee  Follow Up In Physical Therapy          General  Reason for Referral: bilat hip pain  Referred By: Gab MADRIGAL    Subjective   Current Condition:   Same  Patient reports he is now able to ambulate without his cane to carry objects, previously     Performing HEP?: Yes    Precautions  Precautions  LE Weight Bearing Status: Weight Bearing as Tolerated  Medical Precautions: Fall precautions  Pain  Pain Assessment: 0-10  0-10 (Numeric) Pain Score: 3  Pain Location: Knee  Pain Orientation: Left    Objective         Treatments:    Therapeutic Exercise  Therapeutic Exercise Performed: Yes  Therapeutic Exercise Activity 1: QS x10 5\"  Therapeutic Exercise Activity 2: SLR x20  Therapeutic Exercise Activity 3: Bridge with Hip ABD 2x10  Therapeutic Exercise Activity 4: Hamstring curls prone Green x20  Therapeutic Exercise Activity 5: Nu Step Level 6 x10' Seat 12  Therapeutic Exercise Activity 6: supine knee extension with hip at 90  Therapeutic Exercise Activity 7: TKE Black L x20  Therapeutic Exercise Activity 8: HR x20  Therapeutic Exercise Activity 10: Split stance Rows Double Silver x20 R/L  Therapeutic Exercise Activity 11: side stepping 30'x2  Therapeutic Exercise Activity 12: Standing Hip Extension L x10  Therapeutic Exercise Activity 13: Pallof press double silver x20 R/L    Balance/Neuromuscular Re-Education  Balance/Neuromuscular " "Re-Education Activity Performed: Yes  Balance/Neuromuscular Re-Education Activity 3: NMES L Quads with QS, SAQ, and SLR @ 39mA, 10\" on, 20\" off x11' to increase strength              EDUCATION:   Individual(s) Educated: Patient  Education Provided: HEP   Risk and Benefits Discussed with Patient/Caregiver/Other: Yes   Patient/Caregiver Demonstrated Understanding: Yes   Patient Response to Education: Patient/Caregiver verbalized understanding of information    Assessment: Pt able to perform supine knee extension at 90 hip flexion with less difficulty. Pt able to perform seated knee extension however takes much effort to perform, has to perform eccentric phase slowly to control the motion. Pt was able to perform prone hamstring curls with increased resistance with good technique and decreased difficulty compared to previous sessions.   PT Assessment  PT Assessment Results: Decreased strength, Decreased range of motion, Impaired balance, Decreased mobility, Pain  Rehab Prognosis: Good    Plan: Continue to progress current POC as tolerated to facilitate ability to perform functional activities.   OP PT Plan  Treatment/Interventions: Education/ Instruction, Gait training, Manual therapy, Neuromuscular re-education, Therapeutic activities, Therapeutic exercises  PT Plan: Skilled PT  PT Frequency: 2 times per week  Duration: 4 weeks  Onset Date: 12/26/24  Certification Period Start Date: 01/07/25  Certification Period End Date: 02/04/25  Number of Treatments Authorized: 7 of 8  Rehab Potential: Good  Plan of Care Agreement: Patient    Goals:  Active       PT Problem       Patient will demonstrate improved hamstring flexibility in bilateral lower extremities WFL.       Start:  01/06/25    Expected End:  02/03/25            Patient will maintain Tandem Stance for at least 30 sec with no upper extremity support.       Start:  01/06/25    Expected End:  02/03/25            Patient will ambulate with normal gait pattern with no " device community distances.       Start:  01/06/25    Expected End:  02/03/25            Patient will ascend and descend 1 flight of stairs with rail modified independent and reciprocal pattern.       Start:  01/06/25    Expected End:  02/03/25            Patient will achieve bilateral knee ROM of  0-130 degrees        Start:  01/06/25    Expected End:  02/03/25            Patient will achieve left hip flexion strength of at least 4+/5       Start:  01/06/25    Expected End:  02/03/25            Patient will achieve left hip abduction strength of at least 4+/5       Start:  01/06/25    Expected End:  02/03/25            Patient will achieve left knee extension strength of at least 4+/5       Start:  01/06/25    Expected End:  02/03/25            Patient will demonstrate independence in home program for support of progression       Start:  01/06/25    Expected End:  02/03/25            Patient will report pain of no more than 2/10 demonstrating a reduction of overall pain       Start:  01/06/25    Expected End:  02/03/25            Patient will show a significant change in LEFS (26 to 35) patient reported outcome tool to demonstrate subjective imporovement       Start:  01/06/25    Expected End:  02/03/25                 Ashutosh Montanez PTA

## 2025-02-03 ENCOUNTER — APPOINTMENT (OUTPATIENT)
Dept: PHYSICAL THERAPY | Facility: HOSPITAL | Age: 40
End: 2025-02-03
Payer: MEDICAID

## 2025-02-03 ENCOUNTER — APPOINTMENT (OUTPATIENT)
Dept: RADIOLOGY | Facility: HOSPITAL | Age: 40
End: 2025-02-03
Payer: MEDICAID

## 2025-02-03 ENCOUNTER — HOSPITAL ENCOUNTER (EMERGENCY)
Facility: HOSPITAL | Age: 40
Discharge: HOME | End: 2025-02-03
Payer: MEDICAID

## 2025-02-03 VITALS
OXYGEN SATURATION: 94 % | SYSTOLIC BLOOD PRESSURE: 109 MMHG | BODY MASS INDEX: 30.46 KG/M2 | HEIGHT: 75 IN | HEART RATE: 83 BPM | DIASTOLIC BLOOD PRESSURE: 80 MMHG | WEIGHT: 245 LBS | RESPIRATION RATE: 17 BRPM | TEMPERATURE: 98 F

## 2025-02-03 DIAGNOSIS — R05.1 ACUTE COUGH: Primary | ICD-10-CM

## 2025-02-03 DIAGNOSIS — J20.9 ACUTE BRONCHITIS, UNSPECIFIED ORGANISM: ICD-10-CM

## 2025-02-03 LAB
FLUAV RNA RESP QL NAA+PROBE: NOT DETECTED
FLUBV RNA RESP QL NAA+PROBE: NOT DETECTED
SARS-COV-2 RNA RESP QL NAA+PROBE: NOT DETECTED

## 2025-02-03 PROCEDURE — 99284 EMERGENCY DEPT VISIT MOD MDM: CPT

## 2025-02-03 PROCEDURE — 71045 X-RAY EXAM CHEST 1 VIEW: CPT | Performed by: RADIOLOGY

## 2025-02-03 PROCEDURE — 99285 EMERGENCY DEPT VISIT HI MDM: CPT

## 2025-02-03 PROCEDURE — 87636 SARSCOV2 & INF A&B AMP PRB: CPT

## 2025-02-03 PROCEDURE — 71045 X-RAY EXAM CHEST 1 VIEW: CPT

## 2025-02-03 RX ORDER — ALBUTEROL SULFATE 90 UG/1
1-2 INHALANT RESPIRATORY (INHALATION) EVERY 6 HOURS PRN
Qty: 18 G | Refills: 0 | Status: SHIPPED | OUTPATIENT
Start: 2025-02-03 | End: 2025-03-05

## 2025-02-03 RX ORDER — BENZONATATE 100 MG/1
100 CAPSULE ORAL 3 TIMES DAILY PRN
Qty: 30 CAPSULE | Refills: 0 | Status: SHIPPED | OUTPATIENT
Start: 2025-02-03

## 2025-02-03 RX ORDER — AZITHROMYCIN 250 MG/1
250 TABLET, FILM COATED ORAL DAILY
Qty: 6 TABLET | Refills: 0 | Status: SHIPPED | OUTPATIENT
Start: 2025-02-03 | End: 2025-02-08

## 2025-02-03 ASSESSMENT — PAIN SCALES - GENERAL: PAINLEVEL_OUTOF10: 5 - MODERATE PAIN

## 2025-02-03 ASSESSMENT — PAIN DESCRIPTION - FREQUENCY: FREQUENCY: CONSTANT/CONTINUOUS

## 2025-02-03 ASSESSMENT — PAIN DESCRIPTION - PROGRESSION: CLINICAL_PROGRESSION: GRADUALLY WORSENING

## 2025-02-03 ASSESSMENT — PAIN - FUNCTIONAL ASSESSMENT: PAIN_FUNCTIONAL_ASSESSMENT: 0-10

## 2025-02-03 ASSESSMENT — PAIN DESCRIPTION - LOCATION: LOCATION: CHEST

## 2025-02-03 ASSESSMENT — PAIN DESCRIPTION - ONSET: ONSET: ONGOING

## 2025-02-03 ASSESSMENT — PAIN DESCRIPTION - PAIN TYPE: TYPE: ACUTE PAIN

## 2025-02-03 ASSESSMENT — COLUMBIA-SUICIDE SEVERITY RATING SCALE - C-SSRS
2. HAVE YOU ACTUALLY HAD ANY THOUGHTS OF KILLING YOURSELF?: NO
1. IN THE PAST MONTH, HAVE YOU WISHED YOU WERE DEAD OR WISHED YOU COULD GO TO SLEEP AND NOT WAKE UP?: NO
6. HAVE YOU EVER DONE ANYTHING, STARTED TO DO ANYTHING, OR PREPARED TO DO ANYTHING TO END YOUR LIFE?: NO

## 2025-02-03 ASSESSMENT — PAIN DESCRIPTION - DESCRIPTORS: DESCRIPTORS: PRESSURE

## 2025-02-03 NOTE — ED PROVIDER NOTES
Limitations to history: None  Independent Historians: Family  External Records Reviewed: HIE, OARRS, outpatient notes, inpatient notes, paper charts if needed    History of Present Illness:  Patient is a 39-year-old male presents to ED of a cough.  Patient reports that his cough started last Thursday.  Patient reports that last time this happened he was hospitalized for pneumonia.  Patient reports he is worried he has pneumonia starting.  Patient denies any recent fevers, chills, chest pain, shortness of breath.  Reports he takes no blood thinners, no known medications.  Patient reports he is not taking any over-the-counter medications at home.  Patient is alert and oriented x 3 upon examination, no acute distress.      Denies HA, C/P, SOB, ABD pain, Nausea, Vomiting, Diarrhea, Weakness, Dizziness, Fever, Chills.    PMFSH:   As per HPI, otherwise nurses notes reviewed in EMR    Physical Exam:  Appearance: Alert, oriented x3, supine on exam table with head elevated, cooperative, in no acute distress. Well nourished & well hydrated.      Skin: Intact, dry skin, no lesions, rash, petechiae or purpura.     Eyes: PERRLA, EOMs intact, Conjunctiva pink with no redness or exudates. No scleral icterus.     Ears: Hearing grossly intact.      Nose: Nares patent, no epistaxis.     Mouth: Dentition without concerning abnormalities. no obstruction of posterior pharynx.     Neck: Supple, without meningismus. Trachea at midline.     Pulmonary: Clear bilaterally with good chest wall excursion. No rales, rhonchi or wheezing. No accessory muscle use or stridor. Talking in full sentences.     Cardiac: Normal S1, S2 without murmur, rub, gallop or extrasystole.     Abdomen: Soft, nontender to light and deep palpation to all quadrants, normoactive bowel sounds.  No palpable organomegaly.  No rebound or guarding.     Genitourinary: Physical exam deferred.     Musculoskeletal: Normal gait. Full range of motion to all extremities. Rest of  the exam reveals no pain on palpation, instability, or deformity. Pulses full and equal. No cyanosis or clubbing. capillary refill <2 seconds to all examined digits.     Neurological:  Cranial nerves II through XII are grossly intact, normal sensation, no weakness, no focal findings identified.      Psychiatric: Appropriate mood and affect.    Labs Reviewed   SARS-COV-2 AND INFLUENZA A/B PCR - Normal       Result Value    Flu A Result Not Detected      Flu B Result Not Detected      Coronavirus 2019, PCR Not Detected      Narrative:     This assay is an FDA-cleared, in vitro diagnostic nucleic acid amplification test for the qualitative detection and differentiation of SARS CoV-2/ Influenza A/B from nasopharyngeal specimens collected from individuals with signs and symptoms of respiratory tract infections, and has been validated for use at [facility]. Negative results do not preclude COVID-19/ Influenza A/B infections and should not be used as the sole basis for diagnosis, treatment, or other management decisions. Testing for SARS CoV-2 is recommended only for patients who meet current clinical and/or epidemiological criteria defined by federal, state, or local public health directives.      XR chest 1 view   Final Result   No focal infiltrate or pneumothorax is identified.        MACRO:   None.        Signed by: Taco Briones 2/3/2025 3:20 PM   Dictation workstation:   [id number]                     Repeat Evaluation below    Summary:  Medical Decision Making:   Patient presented as described in HPI. Patient case including ROS, PE, and treatment and plan discussed with ED attending if attached as cosigner. Due to patients presentation orders completed include as documented.  Patient evaluated for complaints of a cough.  Patient is found to be afebrile, nontachycardic and nonhypoxic.  Chest x-ray revealed no focal infiltrate or pneumothorax identified.  Viral swabs negative.  Plan is to  place patient on Mucinex DM, Tessalon Perles and albuterol inhaler.  Patient reports he is worried that this will turn into pneumonia because this is how his symptoms felt when he was previously hospitalized.  Patient will be started on azithromycin due to concern, patient also aware to follow-up with primary care provider by the end of the week.  Patient aware that if he develops fevers, chest pain, shortness of breath to return to ED for further evaluation.  Patient aware of all case findings and aware of plan of care.  Patient was advised to follow up with PCP or recommended provider in 2-3 days for another evaluation and exam. I advised patient/guardian to return or go to closest emergency room immediately if symptoms change, get worse, new symptoms develop prior to follow up. If there is no improvement in symptoms in the next 24 hours they are advised to return for further evaluation and exam. I also explained the plan and treatment course. Patient/guardian is in agreement with plan, treatment course, and follow up and states verbally that they will comply.    Tests/Medications/Escalations of Care considered but not given:    Patient care discussed with: N/A  Social Determinants affecting care: N/A    Final diagnosis and disposition as documented in impression    Homegoing. I discussed the differential; results and discharge plan with the patient and/or family/friend/caregiver if present.  I emphasized the importance of follow-up with the physician I referred them to in the timeframe recommended.  I explained reasons for the patient to return to the Emergency Department. They agreed that if they feel their condition is worsening or if they have any other concern they should call 911 immediately for further assistance. I gave the patient an opportunity to ask all questions they had and answered all of them accordingly. They understand return precautions and discharge instructions. The patient and/or  family/friend/caregiver expressed understanding verbally and that they would comply.       Disposition:  Discharge         This note has been transcribed using voice recognition and may contain grammatical errors, misplaced words, incorrect words, incorrect phrases or other errors.     ALFREDA Gunter-CNP  02/03/25 1559

## 2025-02-03 NOTE — ED TRIAGE NOTES
Patient states he has been coughing for 3 days and is concerned he may have pneumonia. He denies any cold symptoms but does state he has chest pressure.

## 2025-02-04 ENCOUNTER — HOSPITAL ENCOUNTER (OUTPATIENT)
Dept: CARDIOLOGY | Facility: HOSPITAL | Age: 40
Discharge: HOME | End: 2025-02-04
Payer: MEDICAID

## 2025-02-04 LAB
ATRIAL RATE: 87 BPM
P AXIS: 44 DEGREES
P OFFSET: 195 MS
P ONSET: 150 MS
PR INTERVAL: 134 MS
Q ONSET: 217 MS
QRS COUNT: 14 BEATS
QRS DURATION: 76 MS
QT INTERVAL: 344 MS
QTC CALCULATION(BAZETT): 413 MS
QTC FREDERICIA: 389 MS
R AXIS: 69 DEGREES
T AXIS: 49 DEGREES
T OFFSET: 389 MS
VENTRICULAR RATE: 87 BPM

## 2025-02-04 PROCEDURE — 93005 ELECTROCARDIOGRAM TRACING: CPT

## 2025-02-05 ENCOUNTER — APPOINTMENT (OUTPATIENT)
Dept: PHYSICAL THERAPY | Facility: HOSPITAL | Age: 40
End: 2025-02-05
Payer: MEDICAID

## 2025-02-10 ENCOUNTER — APPOINTMENT (OUTPATIENT)
Dept: PHYSICAL THERAPY | Facility: HOSPITAL | Age: 40
End: 2025-02-10
Payer: MEDICAID

## 2025-02-10 ENCOUNTER — TREATMENT (OUTPATIENT)
Dept: PHYSICAL THERAPY | Facility: HOSPITAL | Age: 40
End: 2025-02-10
Payer: MEDICAID

## 2025-02-10 DIAGNOSIS — M86.662 OTHER CHRONIC OSTEOMYELITIS, LEFT TIBIA AND FIBULA: ICD-10-CM

## 2025-02-10 DIAGNOSIS — M25.552 BILATERAL HIP PAIN: Primary | ICD-10-CM

## 2025-02-10 DIAGNOSIS — M25.551 BILATERAL HIP PAIN: Primary | ICD-10-CM

## 2025-02-10 DIAGNOSIS — M65.962 SYNOVITIS OF LEFT KNEE: ICD-10-CM

## 2025-02-10 DIAGNOSIS — S82.892S CLOSED LEFT ANKLE FRACTURE, SEQUELA: ICD-10-CM

## 2025-02-10 PROCEDURE — 97110 THERAPEUTIC EXERCISES: CPT | Mod: GP | Performed by: PHYSICAL THERAPIST

## 2025-02-10 ASSESSMENT — PAIN SCALES - GENERAL: PAINLEVEL_OUTOF10: 3

## 2025-02-10 ASSESSMENT — PAIN - FUNCTIONAL ASSESSMENT: PAIN_FUNCTIONAL_ASSESSMENT: 0-10

## 2025-02-10 NOTE — PROGRESS NOTES
Physical Therapy Reassessment and Treatment    Patient Name: Mark Dao  MRN: [id number]  Today's Date: 2/10/2025  Time Calculation  Start Time: 1518  Stop Time: 1604  Time Calculation (min): 46 min        PT Therapeutic Procedures Time Entry  Neuromuscular Re-Education Time Entry: 3  Therapeutic Exercise Time Entry: 43                Current Problem  1. Bilateral hip pain  Follow Up In Physical Therapy      2. Other chronic osteomyelitis, left tibia and fibula  Follow Up In Physical Therapy      3. Closed left ankle fracture, sequela  Follow Up In Physical Therapy      4. Synovitis of left knee  Follow Up In Physical Therapy        Problem List Items Addressed This Visit             ICD-10-CM    Closed left ankle fracture, sequela S82.892S    Other chronic osteomyelitis, left tibia and fibula M86.662    Bilateral hip pain - Primary M25.551, M25.552     Other Visit Diagnoses         Codes    Synovitis of left knee     M65.962            General  Reason for Referral: bilat hip pain  Referred By: Gab MADRIGAL    Subjective   Current Condition:   Better  Patient reports doing a lot of walking/standing while working on his motorcycle about a week ago. Patient states that he had a lot of fatigue, soreness, and stiffness since.    Performing HEP?: Yes    Precautions  Precautions  LE Weight Bearing Status: Weight Bearing as Tolerated  Medical Precautions: Fall precautions  Pain  Pain Assessment: 0-10  0-10 (Numeric) Pain Score: 3  Pain Location: Knee  Pain Orientation: Left    Objective   HIP    Functional Rating Scale  LEFS   /80: 31    Specific Lower Extremity MMT  R Iliopsoas: (5/5): 5/5  L Iliopsoas: (5/5): 4+/5  R Gluteals (sidelying): (5/5): 5/5  L Gluteals (sidelying): (5/5): 4+/5    Knee PROM  R knee flexion: (140°): WFL  L knee flexion: (140°): 130  R knee extension: (0°): WFL  L knee extension: (0°): 0    Knee MMT  R knee flexion: (5/5): 5/5  L knee flexion: (5/5): 5/5  R knee extension: (5/5): 5/5  L knee  "extension: (5/5): 4-/5      Outcome Measures:  Other Measures  Lower Extremity Funtional Score (LEFS): 31/80    Treatments:  Therapeutic Exercise  Therapeutic Exercise Performed: Yes  Therapeutic Exercise Activity 5: Nu Step Level 6 x10' Seat 11  Therapeutic Exercise Activity 14: step ups Wii x5 LLE  Therapeutic Exercise Activity 15: Step downs Wii x2    Balance/Neuromuscular Re-Education  Balance/Neuromuscular Re-Education Activity Performed: Yes  Balance/Neuromuscular Re-Education Activity 1: Tandem 30\" R/L    EDUCATION:   Individual(s) Educated: Patient  Education Provided: yes  Handout(s) Provided: Scanned into chart  Home Program: reviewed home exercise program   Risk and Benefits Discussed with Patient/Caregiver/Other: Yes   Patient/Caregiver Demonstrated Understanding: Yes   Patient Response to Education: Patient/Caregiver verbalized understanding of information, Patient/Caregiver performed return demonstration of exercises/activities, and Patient/Caregiver asked appropriate questions    Assessment: Patient demonstrated good ability to perform step ups on the Wii step, but fatigued quickly. Patient demonstrated need to use bilateral upper extremities to help when performing step downs. Patient is progressing towards his physical therapy goals. Patient wishes to be able to walk better, potentially without his single point cane, and be able to perform stairs in the next couple of months. Patient is planning on a trip where he'll be doing a lot of walking and will have to perform stairs. Will work with the patient towards these goals. Skilled physical therapy is warranted to address the above stated impairments, so the patient can perform functional activities and work duties without increased pain or difficulty.   PT Assessment  PT Assessment Results: Decreased strength, Decreased range of motion, Impaired balance, Decreased mobility, Pain  Rehab Prognosis: Good  Evaluation/Treatment Tolerance: Patient " tolerated treatment well    Plan: Continue with POC. Progress exercises as tolerated.  OP PT Plan  Treatment/Interventions: Education/ Instruction, Gait training, Manual therapy, Neuromuscular re-education, Therapeutic activities, Therapeutic exercises  PT Plan: Skilled PT  PT Frequency: 2 times per week  Duration: 4 weeks  Onset Date: 12/26/24  Certification Period Start Date: 01/07/25  Certification Period End Date: 02/04/25  Number of Treatments Authorized: 8  Rehab Potential: Good  Plan of Care Agreement: Patient    Goals:  Active       PT Problem       Patient will demonstrate improved hamstring flexibility in bilateral lower extremities WFL. (Not Progressing)       Start:  01/06/25    Expected End:  03/17/25            Patient will maintain Tandem Stance for at least 30 sec with no upper extremity support. (Met)       Start:  01/06/25    Expected End:  02/03/25    Resolved:  02/10/25         Patient will ambulate with normal gait pattern with no device community distances. (Progressing)       Start:  01/06/25    Expected End:  03/17/25            Patient will ascend and descend 1 flight of stairs with rail modified independent and reciprocal pattern. (Not Progressing)       Start:  01/06/25    Expected End:  03/17/25            Patient will achieve bilateral knee ROM of  0-130 degrees  (Met)       Start:  01/06/25    Expected End:  02/03/25    Resolved:  02/10/25         Patient will achieve left hip flexion strength of at least 4+/5 (Met)       Start:  01/06/25    Expected End:  02/03/25    Resolved:  02/10/25         Patient will achieve left hip abduction strength of at least 4+/5 (Met)       Start:  01/06/25    Expected End:  02/03/25    Resolved:  02/10/25         Patient will achieve left knee extension strength of at least 4+/5 (Met)       Start:  01/06/25    Expected End:  02/03/25    Resolved:  02/10/25         Patient will demonstrate independence in home program for support of progression (Met)        Start:  01/06/25    Expected End:  02/03/25    Resolved:  02/10/25         Patient will report pain of no more than 2/10 demonstrating a reduction of overall pain (Progressing)       Start:  01/06/25    Expected End:  03/17/25            Patient will show a significant change in LEFS (26 to 35) patient reported outcome tool to demonstrate subjective imporovement (Progressing)       Start:  01/06/25    Expected End:  03/17/25            Patient will be able to perform sit to stand from regular chair height without use of bilateral upper extremities.       Start:  02/10/25    Expected End:  03/17/25                Patient will be able to perform single leg stance on each lower extremity for at least 20 seconds without upper extremity support.       Start:  02/11/25    Expected End:  03/17/25                     Bonifacio Nicole, PT

## 2025-02-12 ENCOUNTER — TREATMENT (OUTPATIENT)
Dept: PHYSICAL THERAPY | Facility: HOSPITAL | Age: 40
End: 2025-02-12
Payer: MEDICAID

## 2025-02-12 DIAGNOSIS — M86.662 OTHER CHRONIC OSTEOMYELITIS, LEFT TIBIA AND FIBULA: ICD-10-CM

## 2025-02-12 DIAGNOSIS — M25.551 BILATERAL HIP PAIN: Primary | ICD-10-CM

## 2025-02-12 DIAGNOSIS — S82.892S CLOSED LEFT ANKLE FRACTURE, SEQUELA: ICD-10-CM

## 2025-02-12 DIAGNOSIS — M65.962 SYNOVITIS OF LEFT KNEE: ICD-10-CM

## 2025-02-12 DIAGNOSIS — M25.552 BILATERAL HIP PAIN: Primary | ICD-10-CM

## 2025-02-12 PROCEDURE — 97110 THERAPEUTIC EXERCISES: CPT | Mod: GP | Performed by: PHYSICAL THERAPIST

## 2025-02-12 ASSESSMENT — PAIN SCALES - GENERAL: PAINLEVEL_OUTOF10: 3

## 2025-02-12 ASSESSMENT — PAIN - FUNCTIONAL ASSESSMENT: PAIN_FUNCTIONAL_ASSESSMENT: 0-10

## 2025-02-12 NOTE — PROGRESS NOTES
Physical Therapy Treatment    Patient Name: Mark Dao  MRN: [id number]  Today's Date: 2/12/2025  Time Calculation  Start Time: 1649  Stop Time: 1730  Time Calculation (min): 41 min        PT Therapeutic Procedures Time Entry  Therapeutic Exercise Time Entry: 41                Current Problem  1. Bilateral hip pain  Follow Up In Physical Therapy      2. Other chronic osteomyelitis, left tibia and fibula  Follow Up In Physical Therapy      3. Closed left ankle fracture, sequela  Follow Up In Physical Therapy      4. Synovitis of left knee  Follow Up In Physical Therapy        Problem List Items Addressed This Visit             ICD-10-CM    Closed left ankle fracture, sequela S82.892S    Other chronic osteomyelitis, left tibia and fibula M86.662    Bilateral hip pain - Primary M25.551, M25.552     Other Visit Diagnoses         Codes    Synovitis of left knee     M65.962            General  Reason for Referral: bilat hip pain  Referred By: Gab MADRIGAL    Subjective   Current Condition:   Better  Patient reports soreness today after doing a lot of walking earlier today.    Performing HEP?: Yes    Precautions  Precautions  LE Weight Bearing Status: Weight Bearing as Tolerated  Medical Precautions: Fall precautions  Pain  Pain Assessment: 0-10  0-10 (Numeric) Pain Score: 3  Pain Location: Knee  Pain Orientation: Left    Objective     Treatments:  Therapeutic Exercise  Therapeutic Exercise Performed: Yes  Therapeutic Exercise Activity 3: Bridge with black Hip ABD 2x10  Therapeutic Exercise Activity 4: Hamstring curls prone Green x10  Therapeutic Exercise Activity 5: Nu Step Level 6 x10' Seat 11  Therapeutic Exercise Activity 7: TKE Black L x20  Therapeutic Exercise Activity 10: Split stance Rows Double Silver x20 R/L  Therapeutic Exercise Activity 13: Pallof press double silver x20 R/L  Therapeutic Exercise Activity 14: step ups Wii x10 LLE  Therapeutic Exercise Activity 16: lateral step up Wii x3         EDUCATION:    Individual(s) Educated: Patient  Education Provided: yes  Handout(s) Provided: Scanned into chart  Home Program: reviewed home exercise program   Risk and Benefits Discussed with Patient/Caregiver/Other: Yes   Patient/Caregiver Demonstrated Understanding: Yes   Patient Response to Education: Patient/Caregiver verbalized understanding of information, Patient/Caregiver performed return demonstration of exercises/activities, and Patient/Caregiver asked appropriate questions    Assessment: Patient demonstrated good tolerance to exercises without complaints of increased pain. Patient demonstrated difficulty performing lateral step ups due to fatigue. Patient's leg started shaking after 3 reps so exercise was stopped.   PT Assessment  PT Assessment Results: Decreased strength, Decreased range of motion, Impaired balance, Decreased mobility, Pain  Rehab Prognosis: Good  Evaluation/Treatment Tolerance: Patient tolerated treatment well    Plan: Continue with POC. Progress exercises as tolerated.  OP PT Plan  Treatment/Interventions: Education/ Instruction, Gait training, Manual therapy, Neuromuscular re-education, Therapeutic activities, Therapeutic exercises  PT Plan: Skilled PT  PT Frequency: 2 times per week  Duration: 5 weeks  Onset Date: 12/26/24  Certification Period Start Date: 01/07/25  Certification Period End Date: 03/17/25  Number of Treatments Authorized: 9 of 18  Rehab Potential: Good  Plan of Care Agreement: Patient    Goals:  Active       PT Problem       Patient will demonstrate improved hamstring flexibility in bilateral lower extremities WFL. (Not Progressing)       Start:  01/06/25    Expected End:  03/17/25            Patient will maintain Tandem Stance for at least 30 sec with no upper extremity support. (Met)       Start:  01/06/25    Expected End:  02/03/25    Resolved:  02/10/25         Patient will ambulate with normal gait pattern with no device community distances. (Progressing)       Start:   01/06/25    Expected End:  03/17/25            Patient will ascend and descend 1 flight of stairs with rail modified independent and reciprocal pattern. (Not Progressing)       Start:  01/06/25    Expected End:  03/17/25            Patient will achieve bilateral knee ROM of  0-130 degrees  (Met)       Start:  01/06/25    Expected End:  02/03/25    Resolved:  02/10/25         Patient will achieve left hip flexion strength of at least 4+/5 (Met)       Start:  01/06/25    Expected End:  02/03/25    Resolved:  02/10/25         Patient will achieve left hip abduction strength of at least 4+/5 (Met)       Start:  01/06/25    Expected End:  02/03/25    Resolved:  02/10/25         Patient will achieve left knee extension strength of at least 4+/5 (Met)       Start:  01/06/25    Expected End:  02/03/25    Resolved:  02/10/25         Patient will demonstrate independence in home program for support of progression (Met)       Start:  01/06/25    Expected End:  02/03/25    Resolved:  02/10/25         Patient will report pain of no more than 2/10 demonstrating a reduction of overall pain (Progressing)       Start:  01/06/25    Expected End:  03/17/25            Patient will show a significant change in LEFS (26 to 35) patient reported outcome tool to demonstrate subjective imporovement (Progressing)       Start:  01/06/25    Expected End:  03/17/25            Patient will be able to perform sit to stand from regular chair height without use of bilateral upper extremities.       Start:  02/10/25    Expected End:  03/17/25                Patient will be able to perform single leg stance on each lower extremity for at least 20 seconds without upper extremity support.       Start:  02/11/25    Expected End:  03/17/25                     Bonifacio Nicole, PT  ]

## 2025-02-19 ENCOUNTER — TREATMENT (OUTPATIENT)
Dept: PHYSICAL THERAPY | Facility: HOSPITAL | Age: 40
End: 2025-02-19
Payer: MEDICAID

## 2025-02-19 DIAGNOSIS — S82.892S CLOSED LEFT ANKLE FRACTURE, SEQUELA: ICD-10-CM

## 2025-02-19 DIAGNOSIS — M25.552 BILATERAL HIP PAIN: ICD-10-CM

## 2025-02-19 DIAGNOSIS — M25.551 BILATERAL HIP PAIN: ICD-10-CM

## 2025-02-19 DIAGNOSIS — M65.962 SYNOVITIS OF LEFT KNEE: ICD-10-CM

## 2025-02-19 DIAGNOSIS — M86.662 OTHER CHRONIC OSTEOMYELITIS, LEFT TIBIA AND FIBULA: ICD-10-CM

## 2025-02-19 PROCEDURE — 97110 THERAPEUTIC EXERCISES: CPT | Mod: GP,CQ

## 2025-02-19 ASSESSMENT — PAIN - FUNCTIONAL ASSESSMENT: PAIN_FUNCTIONAL_ASSESSMENT: 0-10

## 2025-02-19 ASSESSMENT — PAIN SCALES - GENERAL: PAINLEVEL_OUTOF10: 4

## 2025-02-19 NOTE — PROGRESS NOTES
"  Physical Therapy Treatment    Patient Name: Mark Dao  MRN: [id number]  Today's Date: 2/19/2025  Time Calculation  Start Time: 1556  Stop Time: 1644  Time Calculation (min): 48 min        PT Therapeutic Procedures Time Entry  Neuromuscular Re-Education Time Entry: 6  Therapeutic Exercise Time Entry: 42                Current Problem  1. Bilateral hip pain  Follow Up In Physical Therapy      2. Other chronic osteomyelitis, left tibia and fibula  Follow Up In Physical Therapy      3. Closed left ankle fracture, sequela  Follow Up In Physical Therapy      4. Synovitis of left knee  Follow Up In Physical Therapy          General  Reason for Referral: bilat hip pain  Referred By: Gab MADRIGAL    Subjective   Current Condition:   Worse  Patient reports he feels like he is going backwards with his progress. States he's not sure if it's because he is trying to do more. States he did a lot of standing to cook a meal last week and noted increased swelling in his L LE that evening.    Performing HEP?: Yes    Precautions  Precautions  LE Weight Bearing Status: Weight Bearing as Tolerated  Medical Precautions: Fall precautions  Pain  Pain Assessment: 0-10  0-10 (Numeric) Pain Score: 4  Pain Location: Knee  Pain Orientation: Left    Objective         Treatments:    Therapeutic Exercise  Therapeutic Exercise Performed: Yes  Therapeutic Exercise Activity 3: Bridge with black Hip ABD 2x10  Therapeutic Exercise Activity 4: Hamstring curls 35# x30  Therapeutic Exercise Activity 5: Nu Step Level 6 x10' Seat 11  Therapeutic Exercise Activity 6: mini lunge with trunk rotation Double Black x20 R/L  Therapeutic Exercise Activity 7: TKE Black L x20  Therapeutic Exercise Activity 8: STS 28\" x8  Therapeutic Exercise Activity 10: Split stance Rows Double Silver x20 R/L  Therapeutic Exercise Activity 14: step ups Wii x10 LLE    Balance/Neuromuscular Re-Education  Balance/Neuromuscular Re-Education Activity Performed: " "Yes  Balance/Neuromuscular Re-Education Activity 1: Airex 1/2 tandem EC 30\" R/L  Balance/Neuromuscular Re-Education Activity 2: Retro walk 30' x2              EDUCATION:   Individual(s) Educated: Patient  Education Provided: HEP   Risk and Benefits Discussed with Patient/Caregiver/Other: Yes   Patient/Caregiver Demonstrated Understanding: Yes   Patient Response to Education: Patient/Caregiver verbalized understanding of information    Assessment: Pt able to perform STS from elevated surface without UE support, however, tends to shift weight to the R LE d/t the L knee wanting to \"give out\". Pt continues to demonstrate significant difficulty performing LAQ d/t pain and weakness during concentric and eccentric phase.   PT Assessment  PT Assessment Results: Decreased strength, Decreased range of motion, Impaired balance, Decreased mobility, Pain  Rehab Prognosis: Good    Plan: Continue to progress current POC as tolerated to facilitate ability to perform functional activities.   OP PT Plan  Treatment/Interventions: Education/ Instruction, Gait training, Manual therapy, Neuromuscular re-education, Therapeutic activities, Therapeutic exercises  PT Plan: Skilled PT  PT Frequency: 2 times per week  Duration: 5 weeks  Onset Date: 12/26/24  Certification Period Start Date: 01/07/25  Certification Period End Date: 03/17/25  Number of Treatments Authorized: 10 of 18  Rehab Potential: Good  Plan of Care Agreement: Patient    Goals:  Active       PT Problem       Patient will demonstrate improved hamstring flexibility in bilateral lower extremities WFL. (Not Progressing)       Start:  01/06/25    Expected End:  03/17/25            Patient will maintain Tandem Stance for at least 30 sec with no upper extremity support. (Met)       Start:  01/06/25    Expected End:  02/03/25    Resolved:  02/10/25         Patient will ambulate with normal gait pattern with no device community distances. (Progressing)       Start:  01/06/25    " Expected End:  03/17/25            Patient will ascend and descend 1 flight of stairs with rail modified independent and reciprocal pattern. (Not Progressing)       Start:  01/06/25    Expected End:  03/17/25            Patient will achieve bilateral knee ROM of  0-130 degrees  (Met)       Start:  01/06/25    Expected End:  02/03/25    Resolved:  02/10/25         Patient will achieve left hip flexion strength of at least 4+/5 (Met)       Start:  01/06/25    Expected End:  02/03/25    Resolved:  02/10/25         Patient will achieve left hip abduction strength of at least 4+/5 (Met)       Start:  01/06/25    Expected End:  02/03/25    Resolved:  02/10/25         Patient will achieve left knee extension strength of at least 4+/5 (Met)       Start:  01/06/25    Expected End:  02/03/25    Resolved:  02/10/25         Patient will demonstrate independence in home program for support of progression (Met)       Start:  01/06/25    Expected End:  02/03/25    Resolved:  02/10/25         Patient will report pain of no more than 2/10 demonstrating a reduction of overall pain (Progressing)       Start:  01/06/25    Expected End:  03/17/25            Patient will show a significant change in LEFS (26 to 35) patient reported outcome tool to demonstrate subjective imporovement (Progressing)       Start:  01/06/25    Expected End:  03/17/25            Patient will be able to perform sit to stand from regular chair height without use of bilateral upper extremities.       Start:  02/10/25    Expected End:  03/17/25                Patient will be able to perform single leg stance on each lower extremity for at least 20 seconds without upper extremity support.       Start:  02/11/25    Expected End:  03/17/25                     Ashutosh Montanez PTA

## 2025-02-24 ENCOUNTER — TREATMENT (OUTPATIENT)
Dept: PHYSICAL THERAPY | Facility: HOSPITAL | Age: 40
End: 2025-02-24
Payer: MEDICAID

## 2025-02-24 DIAGNOSIS — M65.962 SYNOVITIS OF LEFT KNEE: ICD-10-CM

## 2025-02-24 DIAGNOSIS — M86.662 OTHER CHRONIC OSTEOMYELITIS, LEFT TIBIA AND FIBULA: ICD-10-CM

## 2025-02-24 DIAGNOSIS — M25.552 BILATERAL HIP PAIN: ICD-10-CM

## 2025-02-24 DIAGNOSIS — S82.892S CLOSED LEFT ANKLE FRACTURE, SEQUELA: ICD-10-CM

## 2025-02-24 DIAGNOSIS — M25.551 BILATERAL HIP PAIN: ICD-10-CM

## 2025-02-24 PROCEDURE — 97110 THERAPEUTIC EXERCISES: CPT | Mod: GP,CQ

## 2025-02-24 PROCEDURE — 97112 NEUROMUSCULAR REEDUCATION: CPT | Mod: GP,CQ

## 2025-02-24 ASSESSMENT — PAIN SCALES - GENERAL: PAINLEVEL_OUTOF10: 4

## 2025-02-24 ASSESSMENT — PAIN - FUNCTIONAL ASSESSMENT: PAIN_FUNCTIONAL_ASSESSMENT: 0-10

## 2025-02-24 NOTE — PROGRESS NOTES
"  Physical Therapy Treatment    Patient Name: Mark Dao  MRN: [id number]  Today's Date: 2/24/2025  Time Calculation  Start Time: 1602  Stop Time: 1652  Time Calculation (min): 50 min        PT Therapeutic Procedures Time Entry  Neuromuscular Re-Education Time Entry: 15  Therapeutic Exercise Time Entry: 35                Current Problem  1. Bilateral hip pain  Follow Up In Physical Therapy      2. Other chronic osteomyelitis, left tibia and fibula  Follow Up In Physical Therapy      3. Closed left ankle fracture, sequela  Follow Up In Physical Therapy      4. Synovitis of left knee  Follow Up In Physical Therapy          General  Reason for Referral: bilat hip pain  Referred By: Gab MADRIGAL    Subjective   Current Condition:   Same  Patient reports he is having less difficulty with extending his L knee.     Performing HEP?: Yes    Precautions  Precautions  LE Weight Bearing Status: Weight Bearing as Tolerated  Medical Precautions: Fall precautions  Pain  Pain Assessment: 0-10  0-10 (Numeric) Pain Score: 4  Pain Location: Knee  Pain Orientation: Left    Objective         Treatments:    Therapeutic Exercise  Therapeutic Exercise Performed: Yes  Therapeutic Exercise Activity 3: Bridge with black Hip ABD 2x10  Therapeutic Exercise Activity 4: Hamstring curls 35# x30  Therapeutic Exercise Activity 5: Nu Step Level 8 x6' Seat 11  Therapeutic Exercise Activity 6: mini lunge with trunk rotation Double Black x20 R/L  Therapeutic Exercise Activity 7: TKE Black L x20  Therapeutic Exercise Activity 8: STS 28\" x8  Therapeutic Exercise Activity 9: Treadmill .5-.7 mph x5'  Therapeutic Exercise Activity 10: Split stance Rows Double Silver x20 R/L  Therapeutic Exercise Activity 11: Band assisted knee extension/flexion Red x20  Therapeutic Exercise Activity 14: step ups Wii x10 LLE    Balance/Neuromuscular Re-Education  Balance/Neuromuscular Re-Education Activity Performed: Yes  Balance/Neuromuscular Re-Education Activity 1: NMES " "L Quads @ 54mA, 10\" on, 10\" off x8'  Balance/Neuromuscular Re-Education Activity 2: Retro walk 30' x2  Balance/Neuromuscular Re-Education Activity 3: side step 20' x2              EDUCATION:   Individual(s) Educated: Patient  Education Provided: clamshells, t-band assist knee extension  Risk and Benefits Discussed with Patient/Caregiver/Other: Yes   Patient/Caregiver Demonstrated Understanding: Yes   Patient Response to Education: Patient/Caregiver verbalized understanding of information    Assessment: Pt was able to perform knee extension/flexion with band assist without c/o knee pain and good motor control. Pt demonstrated demonstrated good heel/toe gait with ambulation on the treadmill, however, decreased step length noted.   PT Assessment  PT Assessment Results: Decreased strength, Decreased range of motion, Impaired balance, Decreased mobility, Pain  Rehab Prognosis: Good    Plan: Continue to progress current POC as tolerated to facilitate ability to perform functional activities.   OP PT Plan  Treatment/Interventions: Education/ Instruction, Gait training, Manual therapy, Neuromuscular re-education, Therapeutic activities, Therapeutic exercises  PT Plan: Skilled PT  PT Frequency: 2 times per week  Duration: 5 weeks  Onset Date: 12/26/24  Certification Period Start Date: 01/07/25  Certification Period End Date: 03/17/25  Number of Treatments Authorized: 11 of 18  Rehab Potential: Good  Plan of Care Agreement: Patient    Goals:  Active       PT Problem       Patient will demonstrate improved hamstring flexibility in bilateral lower extremities WFL. (Not Progressing)       Start:  01/06/25    Expected End:  03/17/25            Patient will maintain Tandem Stance for at least 30 sec with no upper extremity support. (Met)       Start:  01/06/25    Expected End:  02/03/25    Resolved:  02/10/25         Patient will ambulate with normal gait pattern with no device community distances. (Progressing)       Start:  " 01/06/25    Expected End:  03/17/25            Patient will ascend and descend 1 flight of stairs with rail modified independent and reciprocal pattern. (Not Progressing)       Start:  01/06/25    Expected End:  03/17/25            Patient will achieve bilateral knee ROM of  0-130 degrees  (Met)       Start:  01/06/25    Expected End:  02/03/25    Resolved:  02/10/25         Patient will achieve left hip flexion strength of at least 4+/5 (Met)       Start:  01/06/25    Expected End:  02/03/25    Resolved:  02/10/25         Patient will achieve left hip abduction strength of at least 4+/5 (Met)       Start:  01/06/25    Expected End:  02/03/25    Resolved:  02/10/25         Patient will achieve left knee extension strength of at least 4+/5 (Met)       Start:  01/06/25    Expected End:  02/03/25    Resolved:  02/10/25         Patient will demonstrate independence in home program for support of progression (Met)       Start:  01/06/25    Expected End:  02/03/25    Resolved:  02/10/25         Patient will report pain of no more than 2/10 demonstrating a reduction of overall pain (Progressing)       Start:  01/06/25    Expected End:  03/17/25            Patient will show a significant change in LEFS (26 to 35) patient reported outcome tool to demonstrate subjective imporovement (Progressing)       Start:  01/06/25    Expected End:  03/17/25            Patient will be able to perform sit to stand from regular chair height without use of bilateral upper extremities.       Start:  02/10/25    Expected End:  03/17/25                Patient will be able to perform single leg stance on each lower extremity for at least 20 seconds without upper extremity support.       Start:  02/11/25    Expected End:  03/17/25                     Ashutosh Montanez PTA

## 2025-02-26 ENCOUNTER — OFFICE VISIT (OUTPATIENT)
Dept: GASTROENTEROLOGY | Facility: CLINIC | Age: 40
End: 2025-02-26
Payer: MEDICAID

## 2025-02-26 VITALS
TEMPERATURE: 97.4 F | BODY MASS INDEX: 32.15 KG/M2 | HEIGHT: 76 IN | DIASTOLIC BLOOD PRESSURE: 76 MMHG | SYSTOLIC BLOOD PRESSURE: 122 MMHG | HEART RATE: 80 BPM | WEIGHT: 264 LBS

## 2025-02-26 DIAGNOSIS — K21.9 GASTROESOPHAGEAL REFLUX DISEASE, UNSPECIFIED WHETHER ESOPHAGITIS PRESENT: Primary | ICD-10-CM

## 2025-02-26 DIAGNOSIS — Z72.0 TOBACCO USE: ICD-10-CM

## 2025-02-26 DIAGNOSIS — K21.9 GASTROESOPHAGEAL REFLUX DISEASE WITHOUT ESOPHAGITIS: ICD-10-CM

## 2025-02-26 PROCEDURE — 99244 OFF/OP CNSLTJ NEW/EST MOD 40: CPT | Performed by: NURSE PRACTITIONER

## 2025-02-26 PROCEDURE — 3008F BODY MASS INDEX DOCD: CPT | Performed by: NURSE PRACTITIONER

## 2025-02-26 PROCEDURE — 99214 OFFICE O/P EST MOD 30 MIN: CPT | Performed by: NURSE PRACTITIONER

## 2025-02-26 ASSESSMENT — ENCOUNTER SYMPTOMS
PALPITATIONS: 0
PHOTOPHOBIA: 0
DIAPHORESIS: 0
FATIGUE: 0
ARTHRALGIAS: 0
JOINT SWELLING: 0
NERVOUS/ANXIOUS: 0
NUMBNESS: 0
CHILLS: 0
EYE PAIN: 0
AGITATION: 0
SHORTNESS OF BREATH: 0
DYSURIA: 0
MYALGIAS: 0
BACK PAIN: 0
WHEEZING: 0
LIGHT-HEADEDNESS: 0
SORE THROAT: 0
FREQUENCY: 0
FLANK PAIN: 0
DIZZINESS: 0
HEADACHES: 0
HALLUCINATIONS: 0
FEVER: 0
WEAKNESS: 0
HEMATURIA: 0
COUGH: 0
ADENOPATHY: 0

## 2025-02-26 NOTE — PATIENT INSTRUCTIONS
Thanks for coming to the GI clinic.     I would like you to get an EGD. Someone will contact you to schedule. Otherwise call [phone] to schedule.     Continue pantoprazole 40 mg once daily (to be taken 30-60 minutes prior to breakfast).     Try to adhere to acid reflux dietary precautions.     Try to stop using tobacco as it causes acid reflux.     Follow up with me in clinic 3 weeks after completion of the EGD.
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
## 2025-02-28 ENCOUNTER — TREATMENT (OUTPATIENT)
Dept: PHYSICAL THERAPY | Facility: HOSPITAL | Age: 40
End: 2025-02-28
Payer: MEDICAID

## 2025-02-28 DIAGNOSIS — S82.892S CLOSED LEFT ANKLE FRACTURE, SEQUELA: ICD-10-CM

## 2025-02-28 DIAGNOSIS — M25.552 BILATERAL HIP PAIN: ICD-10-CM

## 2025-02-28 DIAGNOSIS — M86.662 OTHER CHRONIC OSTEOMYELITIS, LEFT TIBIA AND FIBULA: ICD-10-CM

## 2025-02-28 DIAGNOSIS — M65.962 SYNOVITIS OF LEFT KNEE: ICD-10-CM

## 2025-02-28 DIAGNOSIS — M25.551 BILATERAL HIP PAIN: ICD-10-CM

## 2025-02-28 PROCEDURE — 97112 NEUROMUSCULAR REEDUCATION: CPT | Mod: GP,CQ

## 2025-02-28 PROCEDURE — 97110 THERAPEUTIC EXERCISES: CPT | Mod: GP,CQ

## 2025-02-28 ASSESSMENT — PAIN SCALES - GENERAL: PAINLEVEL_OUTOF10: 4

## 2025-02-28 ASSESSMENT — PAIN - FUNCTIONAL ASSESSMENT: PAIN_FUNCTIONAL_ASSESSMENT: 0-10

## 2025-02-28 NOTE — PROGRESS NOTES
"  Physical Therapy Treatment    Patient Name: Mark Dao  MRN: [id number]  Today's Date: 2/28/2025  Time Calculation  Start Time: 1500  Stop Time: 1550  Time Calculation (min): 50 min        PT Therapeutic Procedures Time Entry  Neuromuscular Re-Education Time Entry: 12  Therapeutic Exercise Time Entry: 38                Current Problem  1. Bilateral hip pain  Follow Up In Physical Therapy      2. Other chronic osteomyelitis, left tibia and fibula  Follow Up In Physical Therapy      3. Closed left ankle fracture, sequela  Follow Up In Physical Therapy      4. Synovitis of left knee  Follow Up In Physical Therapy          General  Reason for Referral: bilat hip pain  Referred By: Gab MADRIGAL    Subjective   Current Condition:   Same  Patient reports he had significant hip soreness after last PT session.     Performing HEP?: Yes    Precautions  Precautions  LE Weight Bearing Status: Weight Bearing as Tolerated  Medical Precautions: Fall precautions  Pain  Pain Assessment: 0-10  0-10 (Numeric) Pain Score: 4  Pain Location: Knee  Pain Orientation: Left    Objective   KNEE    Functional Rating Scale     Observation     Knee Palpation/Joint Mobility     Knee AROM     Knee PROM     Knee MMT     DTR     Special Tests     Gait     Flexibility                   Treatments:    Therapeutic Exercise  Therapeutic Exercise Performed: Yes  Therapeutic Exercise Activity 4: Hamstring curls 35# 3x10  Therapeutic Exercise Activity 5: Nu Step Level 8 x6' Seat 11  Therapeutic Exercise Activity 6: mini lunge with trunk rotation Double Black x20 R/L  Therapeutic Exercise Activity 8: STS 28\" x8  Therapeutic Exercise Activity 9: Treadmill .5-.8 mph x5'  Therapeutic Exercise Activity 11: Band assisted knee extension/flexion Red x20                  EDUCATION:   Individual(s) Educated: Patient  Education Provided:   Risk and Benefits Discussed with Patient/Caregiver/Other: Yes   Patient/Caregiver Demonstrated Understanding: Yes   Patient " Response to Education: Patient/Caregiver verbalized understanding of information    Assessment: Patient was able to perform sit to stand from elevated surface today for the first time without UE support, with cuing to shift weight forward to initiate movement. Demonstrates decreased L stance phase and push off when ambulating on treadmill.   PT Assessment  PT Assessment Results: Decreased strength, Decreased range of motion, Impaired balance, Decreased mobility, Pain  Rehab Prognosis: Good    Plan: Continue to progress current POC as tolerated to facilitate ability to perform functional activities.   OP PT Plan  Treatment/Interventions: Education/ Instruction, Gait training, Manual therapy, Neuromuscular re-education, Therapeutic activities, Therapeutic exercises  PT Plan: Skilled PT  PT Frequency: 2 times per week  Duration: 5 weeks  Onset Date: 12/26/24  Certification Period Start Date: 01/07/25  Certification Period End Date: 03/17/25  Number of Treatments Authorized: 12 of 18  Rehab Potential: Good  Plan of Care Agreement: Patient    Goals:  Active       PT Problem       Patient will demonstrate improved hamstring flexibility in bilateral lower extremities WFL. (Not Progressing)       Start:  01/06/25    Expected End:  03/17/25            Patient will maintain Tandem Stance for at least 30 sec with no upper extremity support. (Met)       Start:  01/06/25    Expected End:  02/03/25    Resolved:  02/10/25         Patient will ambulate with normal gait pattern with no device community distances. (Progressing)       Start:  01/06/25    Expected End:  03/17/25            Patient will ascend and descend 1 flight of stairs with rail modified independent and reciprocal pattern. (Not Progressing)       Start:  01/06/25    Expected End:  03/17/25            Patient will achieve bilateral knee ROM of  0-130 degrees  (Met)       Start:  01/06/25    Expected End:  02/03/25    Resolved:  02/10/25         Patient will achieve  left hip flexion strength of at least 4+/5 (Met)       Start:  01/06/25    Expected End:  02/03/25    Resolved:  02/10/25         Patient will achieve left hip abduction strength of at least 4+/5 (Met)       Start:  01/06/25    Expected End:  02/03/25    Resolved:  02/10/25         Patient will achieve left knee extension strength of at least 4+/5 (Met)       Start:  01/06/25    Expected End:  02/03/25    Resolved:  02/10/25         Patient will demonstrate independence in home program for support of progression (Met)       Start:  01/06/25    Expected End:  02/03/25    Resolved:  02/10/25         Patient will report pain of no more than 2/10 demonstrating a reduction of overall pain (Progressing)       Start:  01/06/25    Expected End:  03/17/25            Patient will show a significant change in LEFS (26 to 35) patient reported outcome tool to demonstrate subjective imporovement (Progressing)       Start:  01/06/25    Expected End:  03/17/25            Patient will be able to perform sit to stand from regular chair height without use of bilateral upper extremities.       Start:  02/10/25    Expected End:  03/17/25                Patient will be able to perform single leg stance on each lower extremity for at least 20 seconds without upper extremity support.       Start:  02/11/25    Expected End:  03/17/25                     Ashutosh Montanez PTA

## 2025-03-05 ENCOUNTER — TREATMENT (OUTPATIENT)
Dept: PHYSICAL THERAPY | Facility: HOSPITAL | Age: 40
End: 2025-03-05
Payer: MEDICAID

## 2025-03-05 DIAGNOSIS — S82.892S CLOSED LEFT ANKLE FRACTURE, SEQUELA: ICD-10-CM

## 2025-03-05 DIAGNOSIS — M86.662 OTHER CHRONIC OSTEOMYELITIS, LEFT TIBIA AND FIBULA: ICD-10-CM

## 2025-03-05 DIAGNOSIS — M65.962 SYNOVITIS OF LEFT KNEE: ICD-10-CM

## 2025-03-05 DIAGNOSIS — M25.551 BILATERAL HIP PAIN: Primary | ICD-10-CM

## 2025-03-05 DIAGNOSIS — M25.552 BILATERAL HIP PAIN: Primary | ICD-10-CM

## 2025-03-05 PROCEDURE — 97110 THERAPEUTIC EXERCISES: CPT | Mod: GP | Performed by: PHYSICAL THERAPIST

## 2025-03-05 ASSESSMENT — PAIN SCALES - GENERAL: PAINLEVEL_OUTOF10: 3

## 2025-03-05 ASSESSMENT — PAIN - FUNCTIONAL ASSESSMENT: PAIN_FUNCTIONAL_ASSESSMENT: 0-10

## 2025-03-05 NOTE — PROGRESS NOTES
Physical Therapy Treatment    Patient Name: Mark Dao  MRN: [id number]  Today's Date: 3/5/2025  Time Calculation  Start Time: 1647  Stop Time: 1731  Time Calculation (min): 44 min        PT Therapeutic Procedures Time Entry  Therapeutic Exercise Time Entry: 44                Insurance:  Visit Limit: 30  Date Range: 1/17-3/17  Co-Pay: $0    Current Problem  1. Bilateral hip pain  Follow Up In Physical Therapy      2. Other chronic osteomyelitis, left tibia and fibula  Follow Up In Physical Therapy      3. Closed left ankle fracture, sequela  Follow Up In Physical Therapy      4. Synovitis of left knee  Follow Up In Physical Therapy        Problem List Items Addressed This Visit             ICD-10-CM    Closed left ankle fracture, sequela S82.892S    Other chronic osteomyelitis, left tibia and fibula M86.662    Bilateral hip pain - Primary M25.551, M25.552     Other Visit Diagnoses         Codes    Synovitis of left knee     M65.962            General  Reason for Referral: bilat hip pain  Referred By: Gab MADRIGAL  Past Medical History Relevant to Rehab: chronic osteomyelitis left tibia/fibula, left lower extremity pain, closed left ankle fracture. Patient was receiving home health physical therapy from 6/2024-9/2024 post Saucerization left tibia, Removal deep implant left tibia, Injection of intramedullary orthopedic bone cement void filler, Excisional debridement of skin subcutaneous tissue and fascia less than 20 cm² distal tibia, Excisional debridement of skin and subcutaneous tissue less than 20 cm² proximal tibia, Complex closure 5 cm in length distal right tibia requiring the undermining of skin flaps and tissue advancement on 6/26/2024.    Subjective   Current Condition:   Better  Patient reports walking about 1/4 mile a few days ago. Patient states that he walked  half with a cane and half without the cane.    Performing HEP?: Yes    Precautions  Precautions  LE Weight Bearing Status: Weight Bearing as  "Tolerated  Medical Precautions: Fall precautions  Pain  Pain Assessment: 0-10  0-10 (Numeric) Pain Score: 3  Pain Location: Knee  Pain Orientation: Left    Objective     Treatments:  Therapeutic Exercise  Therapeutic Exercise Performed: Yes  Therapeutic Exercise Activity 1: piriformis stretch 3x20\" BLE  Therapeutic Exercise Activity 3: Bridge with black Hip ABD 2x10  Therapeutic Exercise Activity 4: Hamstring curls 35# 3x10  Therapeutic Exercise Activity 5: Nu Step Level 8 x5' Seat 12  Therapeutic Exercise Activity 6: mini lunge with trunk rotation Double Black x20 R/L  Therapeutic Exercise Activity 8: STS 28\" x8  Therapeutic Exercise Activity 9: Treadmill .6-1.2 mph x5'    Balance/Neuromuscular Re-Education  Balance/Neuromuscular Re-Education Activity Performed: No    EDUCATION:   Individual(s) Educated: Patient  Education Provided: yes  Handout(s) Provided: Scanned into chart  Home Program: reviewed home exercise program   Risk and Benefits Discussed with Patient/Caregiver/Other: Yes   Patient/Caregiver Demonstrated Understanding: Yes   Patient Response to Education: Patient/Caregiver verbalized understanding of information, Patient/Caregiver performed return demonstration of exercises/activities, and Patient/Caregiver asked appropriate questions    Assessment: Patient demonstrated good tolerance to exercises without complaints of increased pain. Patient demonstrated good tolerance to progression of walking speed on the treadmill without loss of balance. Patient demonstrated difficulty stepping down from the treadmill due to the treadmill height.   PT Assessment  PT Assessment Results: Decreased strength, Decreased range of motion, Impaired balance, Decreased mobility, Pain  Rehab Prognosis: Good  Evaluation/Treatment Tolerance: Patient tolerated treatment well    Plan: Continue with POC. Progress exercises as tolerated.  OP PT Plan  Treatment/Interventions: Education/ Instruction, Gait training, Manual therapy, " Neuromuscular re-education, Therapeutic activities, Therapeutic exercises  PT Plan: Skilled PT  PT Frequency: 2 times per week  Duration: 5 weeks  Onset Date: 12/26/24  Certification Period Start Date: 01/07/25  Certification Period End Date: 03/17/25  Number of Treatments Authorized: 13 of 18  Rehab Potential: Good  Plan of Care Agreement: Patient    Goals:  Active       PT Problem       Patient will demonstrate improved hamstring flexibility in bilateral lower extremities WFL. (Not Progressing)       Start:  01/06/25    Expected End:  03/17/25            Patient will maintain Tandem Stance for at least 30 sec with no upper extremity support. (Met)       Start:  01/06/25    Expected End:  02/03/25    Resolved:  02/10/25         Patient will ambulate with normal gait pattern with no device community distances. (Progressing)       Start:  01/06/25    Expected End:  03/17/25            Patient will ascend and descend 1 flight of stairs with rail modified independent and reciprocal pattern. (Not Progressing)       Start:  01/06/25    Expected End:  03/17/25            Patient will achieve bilateral knee ROM of  0-130 degrees  (Met)       Start:  01/06/25    Expected End:  02/03/25    Resolved:  02/10/25         Patient will achieve left hip flexion strength of at least 4+/5 (Met)       Start:  01/06/25    Expected End:  02/03/25    Resolved:  02/10/25         Patient will achieve left hip abduction strength of at least 4+/5 (Met)       Start:  01/06/25    Expected End:  02/03/25    Resolved:  02/10/25         Patient will achieve left knee extension strength of at least 4+/5 (Met)       Start:  01/06/25    Expected End:  02/03/25    Resolved:  02/10/25         Patient will demonstrate independence in home program for support of progression (Met)       Start:  01/06/25    Expected End:  02/03/25    Resolved:  02/10/25         Patient will report pain of no more than 2/10 demonstrating a reduction of overall pain  (Progressing)       Start:  01/06/25    Expected End:  03/17/25            Patient will show a significant change in LEFS (26 to 35) patient reported outcome tool to demonstrate subjective imporovement (Progressing)       Start:  01/06/25    Expected End:  03/17/25            Patient will be able to perform sit to stand from regular chair height without use of bilateral upper extremities.       Start:  02/10/25    Expected End:  03/17/25                Patient will be able to perform single leg stance on each lower extremity for at least 20 seconds without upper extremity support.       Start:  02/11/25    Expected End:  03/17/25                     Bonifacio Nicole, PT

## 2025-03-07 ENCOUNTER — TREATMENT (OUTPATIENT)
Dept: PHYSICAL THERAPY | Facility: HOSPITAL | Age: 40
End: 2025-03-07
Payer: MEDICAID

## 2025-03-07 DIAGNOSIS — M86.662 OTHER CHRONIC OSTEOMYELITIS, LEFT TIBIA AND FIBULA: ICD-10-CM

## 2025-03-07 DIAGNOSIS — M25.551 BILATERAL HIP PAIN: ICD-10-CM

## 2025-03-07 DIAGNOSIS — S82.892S CLOSED LEFT ANKLE FRACTURE, SEQUELA: ICD-10-CM

## 2025-03-07 DIAGNOSIS — M25.552 BILATERAL HIP PAIN: ICD-10-CM

## 2025-03-07 DIAGNOSIS — M65.962 SYNOVITIS OF LEFT KNEE: ICD-10-CM

## 2025-03-07 PROCEDURE — 97110 THERAPEUTIC EXERCISES: CPT | Mod: GP,CQ

## 2025-03-07 PROCEDURE — 97112 NEUROMUSCULAR REEDUCATION: CPT | Mod: GP,CQ

## 2025-03-07 ASSESSMENT — PAIN - FUNCTIONAL ASSESSMENT: PAIN_FUNCTIONAL_ASSESSMENT: 0-10

## 2025-03-07 ASSESSMENT — PAIN SCALES - GENERAL: PAINLEVEL_OUTOF10: 3

## 2025-03-07 NOTE — PROGRESS NOTES
"  Physical Therapy Treatment    Patient Name: Mark Dao  MRN: [id number]  Today's Date: 3/7/2025  Time Calculation  Start Time: 1503  Stop Time: 1558  Time Calculation (min): 55 min        PT Therapeutic Procedures Time Entry  Neuromuscular Re-Education Time Entry: 15  Therapeutic Exercise Time Entry: 40                Current Problem  1. Bilateral hip pain  Follow Up In Physical Therapy      2. Other chronic osteomyelitis, left tibia and fibula  Follow Up In Physical Therapy      3. Closed left ankle fracture, sequela  Follow Up In Physical Therapy      4. Synovitis of left knee  Follow Up In Physical Therapy          General  Reason for Referral: bilat hip pain  Referred By: Gab MADRIGAL  Past Medical History Relevant to Rehab: chronic osteomyelitis left tibia/fibula, left lower extremity pain, closed left ankle fracture. Patient was receiving home health physical therapy from 6/2024-9/2024 post Saucerization left tibia, Removal deep implant left tibia, Injection of intramedullary orthopedic bone cement void filler, Excisional debridement of skin subcutaneous tissue and fascia less than 20 cm² distal tibia, Excisional debridement of skin and subcutaneous tissue less than 20 cm² proximal tibia, Complex closure 5 cm in length distal right tibia requiring the undermining of skin flaps and tissue advancement on 6/26/2024.    Subjective   Current Condition:   Better  Patient reports he is noticing improvement with walking however not where he would like to be.     Performing HEP?: Yes    Precautions  Precautions  LE Weight Bearing Status: Weight Bearing as Tolerated  Medical Precautions: Fall precautions  Pain  Pain Assessment: 0-10  0-10 (Numeric) Pain Score: 3  Pain Location: Knee  Pain Orientation: Left    Objective         Treatments:    Therapeutic Exercise  Therapeutic Exercise Performed: Yes  Therapeutic Exercise Activity 1: piriformis stretch 3x20\" BLE  Therapeutic Exercise Activity 3: Bridge with black Hip " "ABD 2x10  Therapeutic Exercise Activity 4: Hamstring curls 35# 3x10  Therapeutic Exercise Activity 5: Nu Step Level 8 x5' Seat 12  Therapeutic Exercise Activity 6: mini lunge with trunk rotation Double Black x20 R/L  Therapeutic Exercise Activity 8: STS 28\" x10 Partial, Full x10  Therapeutic Exercise Activity 10: side step Yellow 15'x4  Therapeutic Exercise Activity 11: RDL 10# KB x10    Balance/Neuromuscular Re-Education  Balance/Neuromuscular Re-Education Activity Performed: Yes  Balance/Neuromuscular Re-Education Activity 1: NMES L Quads 4 pads @ 51 mA, 10\" on, 10\" off x10 with Iso knee extension at 90, 60, 40  Balance/Neuromuscular Re-Education Activity 2: Retro walk 10# KB 30' x2  Balance/Neuromuscular Re-Education Activity 3: Airex 3/4 tandem with trunk rotation 1' R/L           EDUCATION:   Individual(s) Educated: Patient  Education Provided: side step with band  Risk and Benefits Discussed with Patient/Caregiver/Other: Yes   Patient/Caregiver Demonstrated Understanding: Yes   Patient Response to Education: Patient/Caregiver verbalized understanding of information    Assessment: Attempted modified SL sit to stand with R foot slightly forward, however, unable to perform d/t L knee \"giving out\". Patient noted some discomfort in the LB during RDL however felt it was muscle fatigue, not LBP. He required cuing to avoid circumduction of the L LE when ambulating at the end of PT session, with good correction noted.   PT Assessment  PT Assessment Results: Decreased strength, Decreased range of motion, Impaired balance, Decreased mobility, Pain  Rehab Prognosis: Good    Plan: Continue to progress current POC as tolerated to facilitate ability to perform functional activities.   OP PT Plan  Treatment/Interventions: Education/ Instruction, Gait training, Manual therapy, Neuromuscular re-education, Therapeutic activities, Therapeutic exercises  PT Plan: Skilled PT  PT Frequency: 2 times per week  Duration: 5 weeks  Onset " Date: 12/26/24  Certification Period Start Date: 01/07/25  Certification Period End Date: 03/17/25  Number of Treatments Authorized: 14 of 18  Rehab Potential: Good  Plan of Care Agreement: Patient    Goals:  Active       PT Problem       Patient will demonstrate improved hamstring flexibility in bilateral lower extremities WFL. (Not Progressing)       Start:  01/06/25    Expected End:  03/17/25            Patient will maintain Tandem Stance for at least 30 sec with no upper extremity support. (Met)       Start:  01/06/25    Expected End:  02/03/25    Resolved:  02/10/25         Patient will ambulate with normal gait pattern with no device community distances. (Progressing)       Start:  01/06/25    Expected End:  03/17/25            Patient will ascend and descend 1 flight of stairs with rail modified independent and reciprocal pattern. (Not Progressing)       Start:  01/06/25    Expected End:  03/17/25            Patient will achieve bilateral knee ROM of  0-130 degrees  (Met)       Start:  01/06/25    Expected End:  02/03/25    Resolved:  02/10/25         Patient will achieve left hip flexion strength of at least 4+/5 (Met)       Start:  01/06/25    Expected End:  02/03/25    Resolved:  02/10/25         Patient will achieve left hip abduction strength of at least 4+/5 (Met)       Start:  01/06/25    Expected End:  02/03/25    Resolved:  02/10/25         Patient will achieve left knee extension strength of at least 4+/5 (Met)       Start:  01/06/25    Expected End:  02/03/25    Resolved:  02/10/25         Patient will demonstrate independence in home program for support of progression (Met)       Start:  01/06/25    Expected End:  02/03/25    Resolved:  02/10/25         Patient will report pain of no more than 2/10 demonstrating a reduction of overall pain (Progressing)       Start:  01/06/25    Expected End:  03/17/25            Patient will show a significant change in LEFS (26 to 35) patient reported outcome  tool to demonstrate subjective imporovement (Progressing)       Start:  01/06/25    Expected End:  03/17/25            Patient will be able to perform sit to stand from regular chair height without use of bilateral upper extremities.       Start:  02/10/25    Expected End:  03/17/25                Patient will be able to perform single leg stance on each lower extremity for at least 20 seconds without upper extremity support.       Start:  02/11/25    Expected End:  03/17/25                     Ashutosh Montanez, PTA

## 2025-03-10 ENCOUNTER — ANESTHESIA EVENT (OUTPATIENT)
Dept: OPERATING ROOM | Facility: HOSPITAL | Age: 40
End: 2025-03-10
Payer: MEDICAID

## 2025-03-10 RX ORDER — ACETAMINOPHEN 325 MG/1
650 TABLET ORAL EVERY 4 HOURS PRN
Status: CANCELLED | OUTPATIENT
Start: 2025-03-10

## 2025-03-10 RX ORDER — ALBUTEROL SULFATE 0.83 MG/ML
2.5 SOLUTION RESPIRATORY (INHALATION) ONCE AS NEEDED
Status: CANCELLED | OUTPATIENT
Start: 2025-03-10

## 2025-03-10 RX ORDER — ONDANSETRON HYDROCHLORIDE 2 MG/ML
8 INJECTION, SOLUTION INTRAVENOUS ONCE
Status: CANCELLED | OUTPATIENT
Start: 2025-03-10 | End: 2025-03-10

## 2025-03-12 ENCOUNTER — ANESTHESIA (OUTPATIENT)
Dept: OPERATING ROOM | Facility: HOSPITAL | Age: 40
End: 2025-03-12
Payer: MEDICAID

## 2025-03-12 ENCOUNTER — HOSPITAL ENCOUNTER (OUTPATIENT)
Dept: OPERATING ROOM | Facility: HOSPITAL | Age: 40
Setting detail: OUTPATIENT SURGERY
Discharge: HOME | End: 2025-03-12
Payer: MEDICAID

## 2025-03-12 VITALS
TEMPERATURE: 97.2 F | DIASTOLIC BLOOD PRESSURE: 89 MMHG | WEIGHT: 259.92 LBS | OXYGEN SATURATION: 100 % | RESPIRATION RATE: 18 BRPM | BODY MASS INDEX: 31.65 KG/M2 | SYSTOLIC BLOOD PRESSURE: 139 MMHG | HEIGHT: 76 IN | HEART RATE: 91 BPM

## 2025-03-12 DIAGNOSIS — K21.9 GASTROESOPHAGEAL REFLUX DISEASE, UNSPECIFIED WHETHER ESOPHAGITIS PRESENT: ICD-10-CM

## 2025-03-12 PROCEDURE — 2500000004 HC RX 250 GENERAL PHARMACY W/ HCPCS (ALT 636 FOR OP/ED): Performed by: ANESTHESIOLOGY

## 2025-03-12 PROCEDURE — A43239 PR EDG TRANSORAL BIOPSY SINGLE/MULTIPLE: Performed by: NURSE ANESTHETIST, CERTIFIED REGISTERED

## 2025-03-12 PROCEDURE — 2500000004 HC RX 250 GENERAL PHARMACY W/ HCPCS (ALT 636 FOR OP/ED): Performed by: NURSE ANESTHETIST, CERTIFIED REGISTERED

## 2025-03-12 PROCEDURE — 7100000009 HC PHASE TWO TIME - INITIAL BASE CHARGE: Performed by: ANESTHESIOLOGY

## 2025-03-12 PROCEDURE — A43239 PR EDG TRANSORAL BIOPSY SINGLE/MULTIPLE: Performed by: ANESTHESIOLOGY

## 2025-03-12 PROCEDURE — 7100000010 HC PHASE TWO TIME - EACH INCREMENTAL 1 MINUTE: Performed by: ANESTHESIOLOGY

## 2025-03-12 PROCEDURE — 43239 EGD BIOPSY SINGLE/MULTIPLE: CPT | Performed by: INTERNAL MEDICINE

## 2025-03-12 PROCEDURE — 3600000007 HC OR TIME - EACH INCREMENTAL 1 MINUTE - PROCEDURE LEVEL TWO: Performed by: ANESTHESIOLOGY

## 2025-03-12 PROCEDURE — 3700000001 HC GENERAL ANESTHESIA TIME - INITIAL BASE CHARGE: Performed by: ANESTHESIOLOGY

## 2025-03-12 PROCEDURE — 3600000002 HC OR TIME - INITIAL BASE CHARGE - PROCEDURE LEVEL TWO: Performed by: ANESTHESIOLOGY

## 2025-03-12 PROCEDURE — 3700000002 HC GENERAL ANESTHESIA TIME - EACH INCREMENTAL 1 MINUTE: Performed by: ANESTHESIOLOGY

## 2025-03-12 RX ORDER — SODIUM CHLORIDE, SODIUM LACTATE, POTASSIUM CHLORIDE, CALCIUM CHLORIDE 600; 310; 30; 20 MG/100ML; MG/100ML; MG/100ML; MG/100ML
100 INJECTION, SOLUTION INTRAVENOUS CONTINUOUS
Status: ACTIVE | OUTPATIENT
Start: 2025-03-12 | End: 2025-03-12

## 2025-03-12 RX ORDER — FENTANYL CITRATE 50 UG/ML
INJECTION, SOLUTION INTRAMUSCULAR; INTRAVENOUS AS NEEDED
Status: DISCONTINUED | OUTPATIENT
Start: 2025-03-12 | End: 2025-03-12

## 2025-03-12 RX ORDER — SODIUM CHLORIDE, SODIUM LACTATE, POTASSIUM CHLORIDE, CALCIUM CHLORIDE 600; 310; 30; 20 MG/100ML; MG/100ML; MG/100ML; MG/100ML
20 INJECTION, SOLUTION INTRAVENOUS CONTINUOUS
Status: DISCONTINUED | OUTPATIENT
Start: 2025-03-12 | End: 2025-03-13 | Stop reason: HOSPADM

## 2025-03-12 RX ORDER — PROPOFOL 10 MG/ML
INJECTION, EMULSION INTRAVENOUS AS NEEDED
Status: DISCONTINUED | OUTPATIENT
Start: 2025-03-12 | End: 2025-03-12

## 2025-03-12 RX ADMIN — PROPOFOL 40 MG: 10 INJECTION, EMULSION INTRAVENOUS at 09:28

## 2025-03-12 RX ADMIN — PROPOFOL 20 MG: 10 INJECTION, EMULSION INTRAVENOUS at 09:30

## 2025-03-12 RX ADMIN — SODIUM CHLORIDE, POTASSIUM CHLORIDE, SODIUM LACTATE AND CALCIUM CHLORIDE 100 ML/HR: 600; 310; 30; 20 INJECTION, SOLUTION INTRAVENOUS at 08:42

## 2025-03-12 RX ADMIN — PROPOFOL 20 MG: 10 INJECTION, EMULSION INTRAVENOUS at 09:35

## 2025-03-12 RX ADMIN — SODIUM CHLORIDE, POTASSIUM CHLORIDE, SODIUM LACTATE AND CALCIUM CHLORIDE: 600; 310; 30; 20 INJECTION, SOLUTION INTRAVENOUS at 09:25

## 2025-03-12 RX ADMIN — FENTANYL CITRATE 25 MCG: 50 INJECTION, SOLUTION INTRAMUSCULAR; INTRAVENOUS at 09:32

## 2025-03-12 RX ADMIN — FENTANYL CITRATE 50 MCG: 50 INJECTION, SOLUTION INTRAMUSCULAR; INTRAVENOUS at 09:28

## 2025-03-12 RX ADMIN — FENTANYL CITRATE 25 MCG: 50 INJECTION, SOLUTION INTRAMUSCULAR; INTRAVENOUS at 09:31

## 2025-03-12 RX ADMIN — PROPOFOL 160 MCG/KG/MIN: 10 INJECTION, EMULSION INTRAVENOUS at 09:29

## 2025-03-12 SDOH — HEALTH STABILITY: MENTAL HEALTH: CURRENT SMOKER: 0

## 2025-03-12 ASSESSMENT — PAIN SCALES - GENERAL
PAINLEVEL_OUTOF10: 0 - NO PAIN
PAIN_LEVEL: 0
PAINLEVEL_OUTOF10: 0 - NO PAIN

## 2025-03-12 ASSESSMENT — PAIN - FUNCTIONAL ASSESSMENT: PAIN_FUNCTIONAL_ASSESSMENT: 0-10

## 2025-03-12 NOTE — ANESTHESIA POSTPROCEDURE EVALUATION
Patient: Mark Dao    Procedure Summary       Date: 03/12/25 Room / Location: [facility] OR    Anesthesia Start: 0914 Anesthesia Stop: 0948    Procedure: EGD Diagnosis: Gastroesophageal reflux disease, unspecified whether esophagitis present    Scheduled Providers: Parminder Loving MD; Markus Loomis MD Responsible Provider: Markus Loomis MD    Anesthesia Type: MAC ASA Status: 3            Anesthesia Type: MAC    Vitals Value Taken Time   /84 03/12/25 0948   Temp 36.2 03/12/25 0948   Pulse 84 03/12/25 0948   Resp 16 03/12/25 0948   SpO2 98 03/12/25 0948       Anesthesia Post Evaluation    Patient location during evaluation: PACU  Patient participation: complete - patient participated  Level of consciousness: awake and alert  Pain score: 0  Pain management: adequate  Airway patency: patent  Two or more strategies used to mitigate risk of obstructive sleep apnea  Cardiovascular status: acceptable and stable  Respiratory status: acceptable and room air  Hydration status: acceptable  Postoperative Nausea and Vomiting: none        There were no known notable events for this encounter.

## 2025-03-12 NOTE — DISCHARGE INSTRUCTIONS

## 2025-03-12 NOTE — ANESTHESIA PREPROCEDURE EVALUATION
Patient: Mark Dao    Procedure Information       Date/Time: 03/12/25 0910    Scheduled providers: Parminder Loving MD; Markus Loomis MD    Procedure: EGD    Location: [facility] OR          Vitals:    03/12/25 0815   BP: 135/87   Pulse: 89   Resp: 16   Temp: 36.7 °C (98.1 °F)   SpO2: 98%       Past Surgical History:   Procedure Laterality Date    CT ANGIO AORTA AND BILATERAL ILIOFEMORAL RUN OFF INCLUDING WITHOUT CONTRAST IF PERFORMED  07/19/2023    CT AORTA AND BILATERAL ILIOFEMORAL RUNOFF ANGIOGRAM W AND/OR WO IV CONTRAST 7/19/2023 GEN CT    TIBIA & FIBIA Left 06/25/2024    Excision Bone Tibia/Fibula    TIBIA & FIBIA  2021    fracture surgery     Past Medical History:   Diagnosis Date    Acute cough 02/03/2025    Bilateral hip pain     Chronic multifocal osteomyelitis, left tibia and fibula (Multi)     Class 1 obesity with body mass index (BMI) of 32.0 to 32.9 in adult 02/26/2025    Closed left ankle fracture     Fixation hardware in lower extremity     GERD (gastroesophageal reflux disease)     Infection and inflammatory reaction due to internal fixation device of left tibia, sequela     Sepsis 07/19/2023       Current Outpatient Medications:     pantoprazole (ProtoNix) 40 mg EC tablet, Take 1 tablet (40 mg) by mouth once daily in the morning. Take before meals. Do not crush, chew, or split., Disp: 90 tablet, Rfl: 1    Current Facility-Administered Medications:     lactated Ringer's infusion, 100 mL/hr, intravenous, Continuous, David Augustin MD, Last Rate: 100 mL/hr at 03/12/25 0842, 100 mL/hr at 03/12/25 0842    lactated Ringer's infusion, 20 mL/hr, intravenous, Continuous, Markus Loomis MD  Prior to Admission medications    Medication Sig Start Date End Date Taking? Authorizing Provider   pantoprazole (ProtoNix) 40 mg EC tablet Take 1 tablet (40 mg) by mouth once daily in the morning. Take before meals. Do not crush, chew, or split. 12/26/24 6/24/25 Yes Tyson De Guzman,  APRN-CNP     No Known Allergies  Social History     Tobacco Use    Smoking status: Every Day     Types: Pipe    Smokeless tobacco: Never   Substance Use Topics    Alcohol use: Yes     Comment: occasional         Chemistry    Lab Results   Component Value Date/Time     07/30/2024 1130    K 4.1 07/30/2024 1130     07/30/2024 1130    CO2 26 07/30/2024 1130    BUN 17 07/30/2024 1130    CREATININE 0.95 07/30/2024 1130    Lab Results   Component Value Date/Time    CALCIUM 9.0 07/30/2024 1130    ALKPHOS 66 07/30/2024 1130    AST 18 07/30/2024 1130    ALT 10 07/30/2024 1130    BILITOT 0.3 07/30/2024 1130          Lab Results   Component Value Date/Time    WBC 8.0 07/30/2024 1130    HGB 14.3 07/30/2024 1130    HCT 44.3 07/30/2024 1130     07/30/2024 1130     Lab Results   Component Value Date/Time    PROTIME 11.7 07/19/2023 0426    INR 1.0 07/19/2023 0426     Encounter Date: 02/03/25   ECG 12 lead   Result Value    Ventricular Rate 87    Atrial Rate 87    AZ Interval 134    QRS Duration 76    QT Interval 344    QTC Calculation(Bazett) 413    P Axis 44    R Axis 69    T Axis 49    QRS Count 14    Q Onset 217    P Onset 150    P Offset 195    T Offset 389    QTC Fredericia 389    Narrative    Normal sinus rhythm  Normal ECG  When compared with ECG of 19-JUL-2023 04:51,  Previous ECG has undetermined rhythm, needs review  Nonspecific T wave abnormality now evident in Lateral leads  See ED provider note for full interpretation and clinical correlation  Confirmed by Zarina Crespo ([id number]) on 2/26/2025 12:26:18 PM     No results found for this or any previous visit from the past 1095 days.      Relevant Problems   ID   (+) Other chronic osteomyelitis, left tibia and fibula       Clinical information reviewed:   Tobacco  Allergies  Meds   Med Hx  Surg Hx   Fam Hx  Soc Hx        NPO Detail:  NPO/Void Status  Carbohydrate Drink Given Prior to Surgery? : N  Date of Last Liquid: 03/11/25  Time of Last Liquid:  2200  Date of Last Solid: 03/11/25  Time of Last Solid: 2200  Last Intake Type: Clear fluids  Time of Last Void: 0700         Physical Exam    Airway  Mallampati: II     Cardiovascular - normal exam     Dental    Pulmonary    Abdominal            Anesthesia Plan    History of general anesthesia?: yes  History of complications of general anesthesia?: no    ASA 3     MAC     The patient is not a current smoker.  Patient was not previously instructed to abstain from smoking on day of procedure.  Patient did not smoke on day of procedure.  Education provided regarding risk of obstructive sleep apnea.  intravenous induction   Anesthetic plan and risks discussed with patient.    Plan discussed with CRNA.

## 2025-03-14 ENCOUNTER — TREATMENT (OUTPATIENT)
Dept: PHYSICAL THERAPY | Facility: HOSPITAL | Age: 40
End: 2025-03-14
Payer: MEDICAID

## 2025-03-14 DIAGNOSIS — M65.962 SYNOVITIS OF LEFT KNEE: ICD-10-CM

## 2025-03-14 DIAGNOSIS — S82.892S CLOSED LEFT ANKLE FRACTURE, SEQUELA: ICD-10-CM

## 2025-03-14 DIAGNOSIS — M86.662 OTHER CHRONIC OSTEOMYELITIS, LEFT TIBIA AND FIBULA: ICD-10-CM

## 2025-03-14 DIAGNOSIS — M25.552 BILATERAL HIP PAIN: Primary | ICD-10-CM

## 2025-03-14 DIAGNOSIS — M25.551 BILATERAL HIP PAIN: Primary | ICD-10-CM

## 2025-03-14 PROCEDURE — 97110 THERAPEUTIC EXERCISES: CPT | Mod: GP | Performed by: PHYSICAL THERAPIST

## 2025-03-14 ASSESSMENT — PAIN - FUNCTIONAL ASSESSMENT: PAIN_FUNCTIONAL_ASSESSMENT: 0-10

## 2025-03-14 ASSESSMENT — PAIN SCALES - GENERAL: PAINLEVEL_OUTOF10: 1

## 2025-03-14 NOTE — PROGRESS NOTES
Physical Therapy Reassessment and Treatment    Patient Name: Mark Dao  MRN: [id number]  Today's Date: 3/14/2025  Time Calculation  Start Time: 1502  Stop Time: 1547  Time Calculation (min): 45 min        PT Therapeutic Procedures Time Entry  Therapeutic Exercise Time Entry: 45                Insurance:  Visit Limit: 30/yr  Co-Pay: $0    Current Problem  1. Bilateral hip pain  Follow Up In Physical Therapy      2. Other chronic osteomyelitis, left tibia and fibula  Follow Up In Physical Therapy      3. Closed left ankle fracture, sequela  Follow Up In Physical Therapy      4. Synovitis of left knee  Follow Up In Physical Therapy        Problem List Items Addressed This Visit             ICD-10-CM    Closed left ankle fracture, sequela S82.892S    Other chronic osteomyelitis, left tibia and fibula M86.662    Bilateral hip pain - Primary M25.551, M25.552     Other Visit Diagnoses         Codes    Synovitis of left knee     M65.962            General  Reason for Referral: bilat hip pain  Referred By: Gab MADRIGAL  Past Medical History Relevant to Rehab: chronic osteomyelitis left tibia/fibula, left lower extremity pain, closed left ankle fracture. Patient was receiving home health physical therapy from 6/2024-9/2024 post Saucerization left tibia, Removal deep implant left tibia, Injection of intramedullary orthopedic bone cement void filler, Excisional debridement of skin subcutaneous tissue and fascia less than 20 cm² distal tibia, Excisional debridement of skin and subcutaneous tissue less than 20 cm² proximal tibia, Complex closure 5 cm in length distal right tibia requiring the undermining of skin flaps and tissue advancement on 6/26/2024.    Subjective   Current Condition:   Better  Patient reports having difficulty walking at home if he doesn't do a warm up first. Patient states that he gets sharp pain in his knee every day. Patient states it is happening less than before starting therapy.    Performing  "HEP?: Yes    Precautions  Precautions  LE Weight Bearing Status: Weight Bearing as Tolerated  Medical Precautions: Fall precautions  Pain  Pain Assessment: 0-10  0-10 (Numeric) Pain Score: 1 (up to 8/10 when his knee richa)  Pain Location: Knee  Pain Orientation: Left    Objective   HIP    Functional Rating Scale  LEFS   /80: 28    Hip PROM  R hip flexion: (125°): WFL  L hip flexion: (125°): WFL  R hip abduction: (45°): WFL  L hip abduction: (45°): WFL  R hip ER: (45°): WFL  L hip ER: (45°): WFL  R hip IR: (45°): WFL  L hip IR: (45°): WFL  Specific Lower Extremity MMT  R Iliopsoas: (5/5): 5/5  L Iliopsoas: (5/5): 5/5  R Gluteals (sidelying): (5/5): 5/5  L Gluteals (sidelying): (5/5): 4+/5    Knee AROM  L knee flexion: (140°): 0-5-120  Knee PROM  Knee PROM WFL: yes  Knee MMT  R knee flexion: (5/5): 5/5  L knee flexion: (5/5): 5/5  R knee extension: (5/5): 5/5  L knee extension: (5/5): 4+/5    Special Tests  Anterior Drawer: (Negative): + LLE, lax compared to RLE  Gait  Gait Comment: Patient demonstrated hip circumduction.  Flexibility  R hamstrings: WFL  L hamstrings: WFL  R hip flexors: WFL  L hip flexors: WFL  R quads: WFL  L quads: WFL    Ankle AROM  Ankle AROM WFL: yes    Ankle MMT  Ankle MMT WFL: yes    Outcome Measures:  Other Measures  Lower Extremity Funtional Score (LEFS): 28/80    Treatments:   Therapeutic Exercise  Therapeutic Exercise Performed: Yes  Therapeutic Exercise Activity 5: Nu Step Level 8 x5' Seat 12  Therapeutic Exercise Activity 7: TKE Black L x20 with hip in extension and hip at 0  Therapeutic Exercise Activity 8: STS 31\" single leg LLE x3 with assistance  Therapeutic Exercise Activity 12: retro walking 25'x2  Therapeutic Exercise Activity 13: step down Wii step x1 rep  Therapeutic Exercise Activity 14: passive knee extension stretch  with 10# weight x2'      EDUCATION:   Individual(s) Educated: Patient  Education Provided: yes  Handout(s) Provided: Scanned into chart  Home Program: " "reviewed home exercise program   Risk and Benefits Discussed with Patient/Caregiver/Other: Yes   Patient/Caregiver Demonstrated Understanding: Yes   Patient Response to Education: Patient/Caregiver verbalized understanding of information, Patient/Caregiver performed return demonstration of exercises/activities, and Patient/Caregiver asked appropriate questions    Assessment: Patient is progressing slowly towards his physical therapy goals. Patient states that he is walking more without his single point cane. Patient continues to have difficulty performing stairs stating that he walks down them backwards. Patient uses a step to pattern to ascend stairs. Patient demonstrated significant leg shaking when attempting step down off a 2\" step and during single leg sit to stands. Patient verbalized left knee pain during TKE which improved with changing his foot position. Patient's left lower extremity tests strong, but patient demonstrates a quad extension lag.   PT Assessment  PT Assessment Results: Decreased strength, Decreased range of motion, Impaired balance, Decreased mobility, Pain  Rehab Prognosis: Good  Evaluation/Treatment Tolerance: Patient limited by pain, Patient tolerated treatment well    Plan: Continue with POC. Progress exercises as tolerated.  OP PT Plan  Treatment/Interventions: Education/ Instruction, Gait training, Manual therapy, Neuromuscular re-education, Therapeutic activities, Therapeutic exercises  PT Plan: Skilled PT  PT Frequency: 3 times per week  Duration: 5 weeks  Onset Date: 12/26/24  Certification Period Start Date: 01/07/25  Certification Period End Date: 03/17/25  Number of Treatments Authorized: 15 of 30  Rehab Potential: Good  Plan of Care Agreement: Patient    Goals:  Active       PT Problem       Patient will demonstrate improved hamstring flexibility in bilateral lower extremities WFL. (Met)       Start:  01/06/25    Expected End:  03/17/25    Resolved:  03/14/25         Patient will " maintain Tandem Stance for at least 30 sec with no upper extremity support. (Met)       Start:  01/06/25    Expected End:  02/03/25    Resolved:  02/10/25         Patient will ambulate with normal gait pattern with no device community distances. (Progressing)       Start:  01/06/25    Expected End:  04/18/25            Patient will ascend and descend 1 flight of stairs with rail modified independent and reciprocal pattern. (Not Progressing)       Start:  01/06/25    Expected End:  04/18/25            Patient will achieve bilateral knee ROM of  0-130 degrees  (Met)       Start:  01/06/25    Expected End:  02/03/25    Resolved:  02/10/25         Patient will achieve left hip flexion strength of at least 4+/5 (Met)       Start:  01/06/25    Expected End:  02/03/25    Resolved:  02/10/25         Patient will achieve left hip abduction strength of at least 4+/5 (Met)       Start:  01/06/25    Expected End:  02/03/25    Resolved:  02/10/25         Patient will achieve left knee extension strength of at least 4+/5 (Met)       Start:  01/06/25    Expected End:  02/03/25    Resolved:  02/10/25         Patient will demonstrate independence in home program for support of progression (Met)       Start:  01/06/25    Expected End:  02/03/25    Resolved:  02/10/25         Patient will report pain of no more than 2/10 demonstrating a reduction of overall pain (Progressing)       Start:  01/06/25    Expected End:  04/18/25            Patient will show a significant change in LEFS (26 to 35) patient reported outcome tool to demonstrate subjective imporovement (Progressing)       Start:  01/06/25    Expected End:  04/18/25            Patient will be able to perform sit to stand from regular chair height without use of bilateral upper extremities. (Not Progressing)       Start:  02/10/25    Expected End:  04/18/25                Patient will be able to perform single leg stance on each lower extremity for at least 20 seconds without  upper extremity support. (Not Progressing)       Start:  02/11/25    Expected End:  04/18/25                     Bonifacio Nicole, PT

## 2025-03-17 ENCOUNTER — TREATMENT (OUTPATIENT)
Dept: PHYSICAL THERAPY | Facility: HOSPITAL | Age: 40
End: 2025-03-17
Payer: MEDICAID

## 2025-03-17 DIAGNOSIS — M86.662 OTHER CHRONIC OSTEOMYELITIS, LEFT TIBIA AND FIBULA: ICD-10-CM

## 2025-03-17 DIAGNOSIS — M65.962 SYNOVITIS OF LEFT KNEE: ICD-10-CM

## 2025-03-17 DIAGNOSIS — S82.892S CLOSED LEFT ANKLE FRACTURE, SEQUELA: ICD-10-CM

## 2025-03-17 DIAGNOSIS — M25.551 BILATERAL HIP PAIN: ICD-10-CM

## 2025-03-17 DIAGNOSIS — M25.552 BILATERAL HIP PAIN: ICD-10-CM

## 2025-03-17 PROCEDURE — 97110 THERAPEUTIC EXERCISES: CPT | Mod: GP,CQ

## 2025-03-17 NOTE — PROGRESS NOTES
Physical Therapy Treatment    Patient Name: Mark Dao  MRN: [id number]  Encounter Date: 3/17/2025  Time Calculation  Start Time: 1602  Stop Time: 1658  Time Calculation (min): 56 min        PT Therapeutic Procedures Time Entry  Therapeutic Exercise Time Entry: 56       Current Problem  Problem List Items Addressed This Visit             ICD-10-CM    Closed left ankle fracture, sequela S82.892S    Other chronic osteomyelitis, left tibia and fibula M86.662    Bilateral hip pain M25.551, M25.552     Other Visit Diagnoses         Codes    Synovitis of left knee     M65.962          1. Bilateral hip pain  Follow Up In Physical Therapy      2. Other chronic osteomyelitis, left tibia and fibula  Follow Up In Physical Therapy      3. Closed left ankle fracture, sequela  Follow Up In Physical Therapy      4. Synovitis of left knee  Follow Up In Physical Therapy          General  Reason for Referral: bilat hip pain  Referred By: Gab MADRIGAL  Past Medical History Relevant to Rehab: chronic osteomyelitis left tibia/fibula, left lower extremity pain, closed left ankle fracture. Patient was receiving home health physical therapy from 6/2024-9/2024 post Saucerization left tibia, Removal deep implant left tibia, Injection of intramedullary orthopedic bone cement void filler, Excisional debridement of skin subcutaneous tissue and fascia less than 20 cm² distal tibia, Excisional debridement of skin and subcutaneous tissue less than 20 cm² proximal tibia, Complex closure 5 cm in length distal right tibia requiring the undermining of skin flaps and tissue advancement on 6/26/2024.  General Comment: Pt presented to therapy without cane.    Subjective   Current Condition:   Better  Patient reports feeling slightly more sore and tired today due to increased standing and walking over the weekend without cane.  Reports increased pain due to uncomfortable sleeping positioning,     Performing HEP?: Yes    Precautions  Precautions  LE  "Weight Bearing Status: Weight Bearing as Tolerated  Medical Precautions: Fall precautions  Pain       Objective     Treatments:    Therapeutic Exercise  Therapeutic Exercise Performed: Yes  Therapeutic Exercise Activity 1: side steping YTB 15'x4  Therapeutic Exercise Activity 2: mini squats YTB x5  Therapeutic Exercise Activity 3: STS YTB x5 form elevated surface  Therapeutic Exercise Activity 4: standing chapo flex 4# 2x15  Therapeutic Exercise Activity 5: Nu Step Level 8 x5' Seat 12  Therapeutic Exercise Activity 6: standing hamstring stretch 3x30\"  Therapeutic Exercise Activity 7: TKE Black L with heel raises 2x15  Therapeutic Exercise Activity 8: STS 31\" single leg LLE x3 with assistance  Therapeutic Exercise Activity 9: standing marches x10  Therapeutic Exercise Activity 12: retro walking 15'x4  Therapeutic Exercise Activity 13: step down Wii step x3 rep      EDUCATION:   Individual(s) Educated: Patient  Education Provided: added standing HS and calf stretching to hep  Handout(s) Provided: Scanned into chart  Home Program: 3x30 sec holds    Risk and Benefits Discussed with Patient/Caregiver/Other: Yes   Patient/Caregiver Demonstrated Understanding: Yes   Patient Response to Education: Patient/Caregiver verbalized understanding of information, Patient/Caregiver performed return demonstration of exercises/activities, and Patient/Caregiver asked appropriate questions    Assessment: Pt continues to demonstrate quad lag with TKE. Demonstrated knee varus and ankle eversion to avoid weight bearing on the LLE during prolonged calf stretches.  Pt demonstrated lack of eccentric quad control during hamstring curls with #4.  Increased instability with weight bearing with poor SLS control or lateral and fwd step ups attempted.    PT Assessment  PT Assessment Results: Decreased strength, Decreased range of motion, Impaired balance, Decreased mobility, Pain  Rehab Prognosis: Good    Plan: Continue with POC. Continue with core " stability, LE strengthening, ROM, flexibility, and balance, so the patient can perform FA's without increased pain or difficulty.    OP PT Plan  Treatment/Interventions: Education/ Instruction, Gait training, Manual therapy, Neuromuscular re-education, Therapeutic activities, Therapeutic exercises  PT Plan: Skilled PT  PT Frequency: 3 times per week  Duration: 5 weeks  Onset Date: 12/26/24  Certification Period Start Date: 01/07/25  Certification Period End Date: 03/17/25  Number of Treatments Authorized: 16 of 30  Rehab Potential: Good  Plan of Care Agreement: Patient  Payor: [employer] / Plan: [employer]ICA[employer] / Product Type: *No Product type* /     Visit count this episode: 16 visits    Goals:  Active       PT Problem       Patient will demonstrate improved hamstring flexibility in bilateral lower extremities WFL. (Met)       Start:  01/06/25    Expected End:  03/17/25    Resolved:  03/14/25         Patient will maintain Tandem Stance for at least 30 sec with no upper extremity support. (Met)       Start:  01/06/25    Expected End:  02/03/25    Resolved:  02/10/25         Patient will ambulate with normal gait pattern with no device community distances. (Progressing)       Start:  01/06/25    Expected End:  04/18/25            Patient will ascend and descend 1 flight of stairs with rail modified independent and reciprocal pattern. (Not Progressing)       Start:  01/06/25    Expected End:  04/18/25            Patient will achieve bilateral knee ROM of  0-130 degrees  (Met)       Start:  01/06/25    Expected End:  02/03/25    Resolved:  02/10/25         Patient will achieve left hip flexion strength of at least 4+/5 (Met)       Start:  01/06/25    Expected End:  02/03/25    Resolved:  02/10/25         Patient will achieve left hip abduction strength of at least 4+/5 (Met)       Start:  01/06/25    Expected End:  02/03/25    Resolved:  02/10/25         Patient will achieve left knee  extension strength of at least 4+/5 (Met)       Start:  01/06/25    Expected End:  02/03/25    Resolved:  02/10/25         Patient will demonstrate independence in home program for support of progression (Met)       Start:  01/06/25    Expected End:  02/03/25    Resolved:  02/10/25         Patient will report pain of no more than 2/10 demonstrating a reduction of overall pain (Progressing)       Start:  01/06/25    Expected End:  04/18/25            Patient will show a significant change in LEFS (26 to 35) patient reported outcome tool to demonstrate subjective imporovement (Progressing)       Start:  01/06/25    Expected End:  04/18/25            Patient will be able to perform sit to stand from regular chair height without use of bilateral upper extremities. (Not Progressing)       Start:  02/10/25    Expected End:  04/18/25                Patient will be able to perform single leg stance on each lower extremity for at least 20 seconds without upper extremity support. (Not Progressing)       Start:  02/11/25    Expected End:  04/18/25                     Donna Matute, PTA

## 2025-03-19 ENCOUNTER — TREATMENT (OUTPATIENT)
Dept: PHYSICAL THERAPY | Facility: HOSPITAL | Age: 40
End: 2025-03-19
Payer: MEDICAID

## 2025-03-19 DIAGNOSIS — M86.662 OTHER CHRONIC OSTEOMYELITIS, LEFT TIBIA AND FIBULA: ICD-10-CM

## 2025-03-19 DIAGNOSIS — M65.962 SYNOVITIS OF LEFT KNEE: ICD-10-CM

## 2025-03-19 DIAGNOSIS — S82.892S CLOSED LEFT ANKLE FRACTURE, SEQUELA: ICD-10-CM

## 2025-03-19 DIAGNOSIS — M25.552 BILATERAL HIP PAIN: ICD-10-CM

## 2025-03-19 DIAGNOSIS — M25.551 BILATERAL HIP PAIN: ICD-10-CM

## 2025-03-19 LAB
LABORATORY COMMENT REPORT: NORMAL
PATH REPORT.FINAL DX SPEC: NORMAL
PATH REPORT.GROSS SPEC: NORMAL
PATH REPORT.RELEVANT HX SPEC: NORMAL
PATH REPORT.TOTAL CANCER: NORMAL

## 2025-03-19 PROCEDURE — 97110 THERAPEUTIC EXERCISES: CPT | Mod: GP,CQ

## 2025-03-19 NOTE — PROGRESS NOTES
Physical Therapy Treatment    Patient Name: Mark Dao  MRN: [id number]  Today's Date: 3/19/2025  Time Calculation  Start Time: 1600  Stop Time: 1641  Time Calculation (min): 41 min        PT Therapeutic Procedures Time Entry  Therapeutic Exercise Time Entry: 41                Current Problem  1. Bilateral hip pain  Follow Up In Physical Therapy      2. Other chronic osteomyelitis, left tibia and fibula  Follow Up In Physical Therapy      3. Closed left ankle fracture, sequela  Follow Up In Physical Therapy      4. Synovitis of left knee  Follow Up In Physical Therapy          General  Reason for Referral: bilat hip pain  Referred By: Gab MADRIGAL  Past Medical History Relevant to Rehab: chronic osteomyelitis left tibia/fibula, left lower extremity pain, closed left ankle fracture. Patient was receiving home health physical therapy from 6/2024-9/2024 post Saucerization left tibia, Removal deep implant left tibia, Injection of intramedullary orthopedic bone cement void filler, Excisional debridement of skin subcutaneous tissue and fascia less than 20 cm² distal tibia, Excisional debridement of skin and subcutaneous tissue less than 20 cm² proximal tibia, Complex closure 5 cm in length distal right tibia requiring the undermining of skin flaps and tissue advancement on 6/26/2024.  General Comment: Pt presented to therapy without cane.    Subjective   Current Condition:   Same  Patient reports his L leg feels stiff today, having difficulty correcting limp when he walks.     Performing HEP?: Yes    Precautions  Precautions  LE Weight Bearing Status: Weight Bearing as Tolerated  Medical Precautions: Fall precautions  Pain       Objective         Treatments:    Therapeutic Exercise  Therapeutic Exercise Performed: Yes  Therapeutic Exercise Activity 1: side steping YTB 15'x4  Therapeutic Exercise Activity 2: mini squats YTB x5  Therapeutic Exercise Activity 3: STS YTB x5 form elevated surface  Therapeutic Exercise  Activity 4: mini lunge with trunk rotation Double Silver x20 R/L  Therapeutic Exercise Activity 5: Nu Step Level 8 x5' Seat 12  Therapeutic Exercise Activity 6: Hamstring curl 70# x10  Therapeutic Exercise Activity 9: standing marches x10  Therapeutic Exercise Activity 10: ambulating with 5# ankle weight fwd/bkwd 30' x4  Therapeutic Exercise Activity 11: placing L foot on 2nd step with 5# ankle weight and Black t-band  Therapeutic Exercise Activity 13: step down Wii step x3 rep                  EDUCATION:   Individual(s) Educated: Patient  Education Provided: HEP   Risk and Benefits Discussed with Patient/Caregiver/Other: Yes   Patient/Caregiver Demonstrated Understanding: Yes   Patient Response to Education: Patient/Caregiver verbalized understanding of information    Assessment: Noted decreased gait deviation and increased pace during ambulation with 5# ankle weight on the L LE for feedback. Patient was able to perform lateral step down with improved control when holding the L arm out to the side for balance.   PT Assessment  PT Assessment Results: Decreased strength, Decreased range of motion, Impaired balance, Decreased mobility, Pain  Rehab Prognosis: Good    Plan: Continue to progress current POC as tolerated to facilitate ability to perform functional activities.   OP PT Plan  Treatment/Interventions: Education/ Instruction, Gait training, Manual therapy, Neuromuscular re-education, Therapeutic activities, Therapeutic exercises  PT Plan: Skilled PT  PT Frequency: 3 times per week  Duration: 5 weeks  Onset Date: 12/26/24  Certification Period Start Date: 01/07/25  Certification Period End Date: 03/17/25  Number of Treatments Authorized: 17 of 30  Rehab Potential: Good  Plan of Care Agreement: Patient    Goals:  Active       PT Problem       Patient will demonstrate improved hamstring flexibility in bilateral lower extremities WFL. (Met)       Start:  01/06/25    Expected End:  03/17/25    Resolved:  03/14/25          Patient will maintain Tandem Stance for at least 30 sec with no upper extremity support. (Met)       Start:  01/06/25    Expected End:  02/03/25    Resolved:  02/10/25         Patient will ambulate with normal gait pattern with no device community distances. (Progressing)       Start:  01/06/25    Expected End:  04/18/25            Patient will ascend and descend 1 flight of stairs with rail modified independent and reciprocal pattern. (Not Progressing)       Start:  01/06/25    Expected End:  04/18/25            Patient will achieve bilateral knee ROM of  0-130 degrees  (Met)       Start:  01/06/25    Expected End:  02/03/25    Resolved:  02/10/25         Patient will achieve left hip flexion strength of at least 4+/5 (Met)       Start:  01/06/25    Expected End:  02/03/25    Resolved:  02/10/25         Patient will achieve left hip abduction strength of at least 4+/5 (Met)       Start:  01/06/25    Expected End:  02/03/25    Resolved:  02/10/25         Patient will achieve left knee extension strength of at least 4+/5 (Met)       Start:  01/06/25    Expected End:  02/03/25    Resolved:  02/10/25         Patient will demonstrate independence in home program for support of progression (Met)       Start:  01/06/25    Expected End:  02/03/25    Resolved:  02/10/25         Patient will report pain of no more than 2/10 demonstrating a reduction of overall pain (Progressing)       Start:  01/06/25    Expected End:  04/18/25            Patient will show a significant change in LEFS (26 to 35) patient reported outcome tool to demonstrate subjective imporovement (Progressing)       Start:  01/06/25    Expected End:  04/18/25            Patient will be able to perform sit to stand from regular chair height without use of bilateral upper extremities. (Not Progressing)       Start:  02/10/25    Expected End:  04/18/25                Patient will be able to perform single leg stance on each lower extremity for at least  20 seconds without upper extremity support. (Not Progressing)       Start:  02/11/25    Expected End:  04/18/25                     Ashutosh Montanez, PTA

## 2025-03-21 ENCOUNTER — TREATMENT (OUTPATIENT)
Dept: PHYSICAL THERAPY | Facility: HOSPITAL | Age: 40
End: 2025-03-21
Payer: MEDICAID

## 2025-03-21 DIAGNOSIS — M86.662 OTHER CHRONIC OSTEOMYELITIS, LEFT TIBIA AND FIBULA: ICD-10-CM

## 2025-03-21 DIAGNOSIS — M25.552 BILATERAL HIP PAIN: ICD-10-CM

## 2025-03-21 DIAGNOSIS — S82.892S CLOSED LEFT ANKLE FRACTURE, SEQUELA: ICD-10-CM

## 2025-03-21 DIAGNOSIS — M65.962 SYNOVITIS OF LEFT KNEE: ICD-10-CM

## 2025-03-21 DIAGNOSIS — M25.551 BILATERAL HIP PAIN: ICD-10-CM

## 2025-03-21 PROCEDURE — 97110 THERAPEUTIC EXERCISES: CPT | Mod: GP,CQ

## 2025-03-21 ASSESSMENT — PAIN - FUNCTIONAL ASSESSMENT: PAIN_FUNCTIONAL_ASSESSMENT: 0-10

## 2025-03-21 ASSESSMENT — PAIN SCALES - GENERAL: PAINLEVEL_OUTOF10: 2

## 2025-03-21 NOTE — PROGRESS NOTES
Physical Therapy Treatment    Patient Name: Mark Dao  MRN: [id number]  Today's Date: 3/21/2025  Time Calculation  Start Time: 1453  Stop Time: 1600  Time Calculation (min): 67 min        PT Therapeutic Procedures Time Entry  Therapeutic Exercise Time Entry: 67                Current Problem  1. Bilateral hip pain  Follow Up In Physical Therapy      2. Other chronic osteomyelitis, left tibia and fibula  Follow Up In Physical Therapy      3. Closed left ankle fracture, sequela  Follow Up In Physical Therapy      4. Synovitis of left knee  Follow Up In Physical Therapy          General  Reason for Referral: bilat hip pain  Referred By: Gab MADRIGAL  Past Medical History Relevant to Rehab: chronic osteomyelitis left tibia/fibula, left lower extremity pain, closed left ankle fracture. Patient was receiving home health physical therapy from 6/2024-9/2024 post Saucerization left tibia, Removal deep implant left tibia, Injection of intramedullary orthopedic bone cement void filler, Excisional debridement of skin subcutaneous tissue and fascia less than 20 cm² distal tibia, Excisional debridement of skin and subcutaneous tissue less than 20 cm² proximal tibia, Complex closure 5 cm in length distal right tibia requiring the undermining of skin flaps and tissue advancement on 6/26/2024.  General Comment: Pt presented to therapy without cane.    Subjective   Current Condition:   Better  Patient reports he was sore after last session so he didn't do anything yesterday. States today is the best he has felt in 4 years.     Performing HEP?: Yes    Precautions  Precautions  LE Weight Bearing Status: Weight Bearing as Tolerated  Medical Precautions: Fall precautions  Pain  Pain Assessment: 0-10  0-10 (Numeric) Pain Score: 2  Pain Location: Knee  Pain Orientation: Left    Objective         Treatments:    Therapeutic Exercise  Therapeutic Exercise Performed: Yes  Therapeutic Exercise Activity 1: side steping YTB  "15'x4  Therapeutic Exercise Activity 3: modified pistol STS x3  Therapeutic Exercise Activity 4: mini lunge with trunk rotation Double Silver x20 R/L  Therapeutic Exercise Activity 5: Nu Step Level 8 x5' Seat 12  Therapeutic Exercise Activity 6: Hamstring curl 70# x20 L  Therapeutic Exercise Activity 7: TKE with ball on wall SLS 30\", 35\"  Therapeutic Exercise Activity 8: single leg bridge x5  Therapeutic Exercise Activity 10: Treadmill 1.6-2.0 mph x10  Therapeutic Exercise Activity 11: placing L foot on 2nd step with 5# ankle weight and Black t-band x15  Therapeutic Exercise Activity 12: Retro walk with Green band  Therapeutic Exercise Activity 13: step down Wii step x4 rep                EDUCATION:   Individual(s) Educated: Patient  Education Provided: single leg bridge  Risk and Benefits Discussed with Patient/Caregiver/Other: Yes   Patient/Caregiver Demonstrated Understanding: Yes   Patient Response to Education: Patient/Caregiver verbalized understanding of information    Assessment: Patient able to increase reps with lateral step downs, with improved eccentric control. Patient able to SLS during TKE with ball on wall, with much effort required and cuing for proper breathing as he tends to hold his breath, with c/o feeling lightheaded. Demonstrating improved gait cycle, with increased hip and knee flexion during swing through phase of gait.   PT Assessment  PT Assessment Results: Decreased strength, Decreased range of motion, Impaired balance, Decreased mobility, Pain  Rehab Prognosis: Good    Plan: Continue to progress current POC as tolerated to facilitate ability to perform functional activities.   OP PT Plan  Treatment/Interventions: Education/ Instruction, Gait training, Manual therapy, Neuromuscular re-education, Therapeutic activities, Therapeutic exercises  PT Plan: Skilled PT  PT Frequency: 3 times per week  Duration: 5 weeks  Onset Date: 12/26/24  Certification Period Start Date: " 01/07/25  Certification Period End Date: 03/17/25  Number of Treatments Authorized: 18 of 30  Rehab Potential: Good  Plan of Care Agreement: Patient    Goals:  Active       PT Problem       Patient will demonstrate improved hamstring flexibility in bilateral lower extremities WFL. (Met)       Start:  01/06/25    Expected End:  03/17/25    Resolved:  03/14/25         Patient will maintain Tandem Stance for at least 30 sec with no upper extremity support. (Met)       Start:  01/06/25    Expected End:  02/03/25    Resolved:  02/10/25         Patient will ambulate with normal gait pattern with no device community distances. (Progressing)       Start:  01/06/25    Expected End:  04/18/25            Patient will ascend and descend 1 flight of stairs with rail modified independent and reciprocal pattern. (Not Progressing)       Start:  01/06/25    Expected End:  04/18/25            Patient will achieve bilateral knee ROM of  0-130 degrees  (Met)       Start:  01/06/25    Expected End:  02/03/25    Resolved:  02/10/25         Patient will achieve left hip flexion strength of at least 4+/5 (Met)       Start:  01/06/25    Expected End:  02/03/25    Resolved:  02/10/25         Patient will achieve left hip abduction strength of at least 4+/5 (Met)       Start:  01/06/25    Expected End:  02/03/25    Resolved:  02/10/25         Patient will achieve left knee extension strength of at least 4+/5 (Met)       Start:  01/06/25    Expected End:  02/03/25    Resolved:  02/10/25         Patient will demonstrate independence in home program for support of progression (Met)       Start:  01/06/25    Expected End:  02/03/25    Resolved:  02/10/25         Patient will report pain of no more than 2/10 demonstrating a reduction of overall pain (Progressing)       Start:  01/06/25    Expected End:  04/18/25            Patient will show a significant change in LEFS (26 to 35) patient reported outcome tool to demonstrate subjective imporovement  (Progressing)       Start:  01/06/25    Expected End:  04/18/25            Patient will be able to perform sit to stand from regular chair height without use of bilateral upper extremities. (Not Progressing)       Start:  02/10/25    Expected End:  04/18/25                Patient will be able to perform single leg stance on each lower extremity for at least 20 seconds without upper extremity support. (Not Progressing)       Start:  02/11/25    Expected End:  04/18/25                     Ashutosh Montanez, PTA

## 2025-03-24 ENCOUNTER — TREATMENT (OUTPATIENT)
Dept: PHYSICAL THERAPY | Facility: HOSPITAL | Age: 40
End: 2025-03-24
Payer: MEDICAID

## 2025-03-24 DIAGNOSIS — S82.892S CLOSED LEFT ANKLE FRACTURE, SEQUELA: ICD-10-CM

## 2025-03-24 DIAGNOSIS — M25.552 BILATERAL HIP PAIN: Primary | ICD-10-CM

## 2025-03-24 DIAGNOSIS — M25.551 BILATERAL HIP PAIN: Primary | ICD-10-CM

## 2025-03-24 DIAGNOSIS — M65.962 SYNOVITIS OF LEFT KNEE: ICD-10-CM

## 2025-03-24 DIAGNOSIS — M86.662 OTHER CHRONIC OSTEOMYELITIS, LEFT TIBIA AND FIBULA: ICD-10-CM

## 2025-03-24 PROCEDURE — 97110 THERAPEUTIC EXERCISES: CPT | Mod: GP | Performed by: PHYSICAL THERAPIST

## 2025-03-24 ASSESSMENT — PAIN - FUNCTIONAL ASSESSMENT: PAIN_FUNCTIONAL_ASSESSMENT: 0-10

## 2025-03-24 ASSESSMENT — PAIN SCALES - GENERAL: PAINLEVEL_OUTOF10: 3

## 2025-03-24 NOTE — PROGRESS NOTES
Physical Therapy Treatment    Patient Name: Mark Dao  MRN: [id number]  Today's Date: 3/24/2025  Time Calculation  Start Time: 1430  Stop Time: 1515  Time Calculation (min): 45 min        PT Therapeutic Procedures Time Entry  Therapeutic Exercise Time Entry: 45                Insurance:  Visit Limit: 30/yr  Co-Pay: $0    Current Problem  1. Bilateral hip pain  Follow Up In Physical Therapy      2. Other chronic osteomyelitis, left tibia and fibula  Follow Up In Physical Therapy      3. Closed left ankle fracture, sequela  Follow Up In Physical Therapy      4. Synovitis of left knee  Follow Up In Physical Therapy        Problem List Items Addressed This Visit             ICD-10-CM    Closed left ankle fracture, sequela S82.892S    Other chronic osteomyelitis, left tibia and fibula M86.662    Bilateral hip pain - Primary M25.551, M25.552     Other Visit Diagnoses         Codes    Synovitis of left knee     M65.962            General  Reason for Referral: bilat hip pain  Referred By: Gab MADRIGAL  Past Medical History Relevant to Rehab: chronic osteomyelitis left tibia/fibula, left lower extremity pain, closed left ankle fracture. Patient was receiving home health physical therapy from 6/2024-9/2024 post Saucerization left tibia, Removal deep implant left tibia, Injection of intramedullary orthopedic bone cement void filler, Excisional debridement of skin subcutaneous tissue and fascia less than 20 cm² distal tibia, Excisional debridement of skin and subcutaneous tissue less than 20 cm² proximal tibia, Complex closure 5 cm in length distal right tibia requiring the undermining of skin flaps and tissue advancement on 6/26/2024.    Subjective   Current Condition:   Better  Patient reports walking better with a 5# ankle weight on his left lower extremity. Patient states that he has to focus on his walking pattern.    Performing HEP?: Yes    Precautions  Precautions  LE Weight Bearing Status: Weight Bearing as  "Tolerated  Medical Precautions: Fall precautions  Pain  Pain Assessment: 0-10  0-10 (Numeric) Pain Score: 3  Pain Location: Knee  Pain Orientation: Left    Objective     Treatments:  Therapeutic Exercise  Therapeutic Exercise Performed: Yes  Therapeutic Exercise Activity 3: modified pistol STS x3 30\" height (assisted)  Therapeutic Exercise Activity 5: Nu Step Level 8 x5' Seat 12  Therapeutic Exercise Activity 6: Hamstring curl 70# x40 L  Therapeutic Exercise Activity 7: TKE with ball on wall SLS 30\", 35\", 25\"  Therapeutic Exercise Activity 10: Treadmill 1.6-2.4 mph x10 with 5# ankle wt LLE  Therapeutic Exercise Activity 12: Retro walk with Green band 2x20'    EDUCATION:   Individual(s) Educated: Patient  Education Provided: yes  Handout(s) Provided: Scanned into chart  Home Program: reviewed home exercise program   Risk and Benefits Discussed with Patient/Caregiver/Other: Yes   Patient/Caregiver Demonstrated Understanding: Yes   Patient Response to Education: Patient/Caregiver verbalized understanding of information, Patient/Caregiver performed return demonstration of exercises/activities, and Patient/Caregiver asked appropriate questions    Assessment: Patient demonstrated improved gait pattern when using a 5# on his left lower extremity. Patient demonstrated knee buckling during modified pistol squat from elevated surface. Patient states that his knee pain increased post therapy today. Patient was encouraged to ice his knee to help decrease his knee pain.  PT Assessment  PT Assessment Results: Decreased strength, Decreased range of motion, Impaired balance, Decreased mobility, Pain  Rehab Prognosis: Good  Evaluation/Treatment Tolerance: Patient tolerated treatment well    Plan: Continue with POC. Progress exercises as tolerated.  OP PT Plan  Treatment/Interventions: Education/ Instruction, Gait training, Manual therapy, Neuromuscular re-education, Therapeutic activities, Therapeutic exercises  PT Plan: Skilled " PT  PT Frequency: 3 times per week  Duration: 5 weeks  Onset Date: 12/26/24  Certification Period Start Date: 01/07/25  Certification Period End Date: 03/17/25  Number of Treatments Authorized: 19 of 30  Rehab Potential: Good  Plan of Care Agreement: Patient    Goals:  Active       PT Problem       Patient will demonstrate improved hamstring flexibility in bilateral lower extremities WFL. (Met)       Start:  01/06/25    Expected End:  03/17/25    Resolved:  03/14/25         Patient will maintain Tandem Stance for at least 30 sec with no upper extremity support. (Met)       Start:  01/06/25    Expected End:  02/03/25    Resolved:  02/10/25         Patient will ambulate with normal gait pattern with no device community distances. (Progressing)       Start:  01/06/25    Expected End:  04/18/25            Patient will ascend and descend 1 flight of stairs with rail modified independent and reciprocal pattern. (Not Progressing)       Start:  01/06/25    Expected End:  04/18/25            Patient will achieve bilateral knee ROM of  0-130 degrees  (Met)       Start:  01/06/25    Expected End:  02/03/25    Resolved:  02/10/25         Patient will achieve left hip flexion strength of at least 4+/5 (Met)       Start:  01/06/25    Expected End:  02/03/25    Resolved:  02/10/25         Patient will achieve left hip abduction strength of at least 4+/5 (Met)       Start:  01/06/25    Expected End:  02/03/25    Resolved:  02/10/25         Patient will achieve left knee extension strength of at least 4+/5 (Met)       Start:  01/06/25    Expected End:  02/03/25    Resolved:  02/10/25         Patient will demonstrate independence in home program for support of progression (Met)       Start:  01/06/25    Expected End:  02/03/25    Resolved:  02/10/25         Patient will report pain of no more than 2/10 demonstrating a reduction of overall pain (Progressing)       Start:  01/06/25    Expected End:  04/18/25            Patient will  show a significant change in LEFS (26 to 35) patient reported outcome tool to demonstrate subjective imporovement (Progressing)       Start:  01/06/25    Expected End:  04/18/25            Patient will be able to perform sit to stand from regular chair height without use of bilateral upper extremities. (Not Progressing)       Start:  02/10/25    Expected End:  04/18/25                Patient will be able to perform single leg stance on each lower extremity for at least 20 seconds without upper extremity support. (Not Progressing)       Start:  02/11/25    Expected End:  04/18/25                     Bonifacio Nicole, PT

## 2025-03-26 ENCOUNTER — TREATMENT (OUTPATIENT)
Dept: PHYSICAL THERAPY | Facility: HOSPITAL | Age: 40
End: 2025-03-26
Payer: MEDICAID

## 2025-03-26 DIAGNOSIS — S82.892S CLOSED LEFT ANKLE FRACTURE, SEQUELA: ICD-10-CM

## 2025-03-26 DIAGNOSIS — M25.551 BILATERAL HIP PAIN: Primary | ICD-10-CM

## 2025-03-26 DIAGNOSIS — M25.552 BILATERAL HIP PAIN: Primary | ICD-10-CM

## 2025-03-26 DIAGNOSIS — M86.662 OTHER CHRONIC OSTEOMYELITIS, LEFT TIBIA AND FIBULA: ICD-10-CM

## 2025-03-26 DIAGNOSIS — M65.962 SYNOVITIS OF LEFT KNEE: ICD-10-CM

## 2025-03-26 PROCEDURE — 97110 THERAPEUTIC EXERCISES: CPT | Mod: GP | Performed by: PHYSICAL THERAPIST

## 2025-03-26 ASSESSMENT — PAIN SCALES - GENERAL: PAINLEVEL_OUTOF10: 4

## 2025-03-26 ASSESSMENT — PAIN - FUNCTIONAL ASSESSMENT: PAIN_FUNCTIONAL_ASSESSMENT: 0-10

## 2025-03-26 NOTE — PROGRESS NOTES
Physical Therapy Treatment    Patient Name: Mark Dao  MRN: [id number]  Today's Date: 3/26/2025  Time Calculation  Start Time: 1604  Stop Time: 1648  Time Calculation (min): 44 min        PT Therapeutic Procedures Time Entry  Therapeutic Exercise Time Entry: 44                  Insurance:  Visit Limit: 30/yr  Co-Pay: $0    Current Problem  1. Bilateral hip pain  Follow Up In Physical Therapy      2. Other chronic osteomyelitis, left tibia and fibula  Follow Up In Physical Therapy      3. Closed left ankle fracture, sequela  Follow Up In Physical Therapy      4. Synovitis of left knee  Follow Up In Physical Therapy        Problem List Items Addressed This Visit             ICD-10-CM    Closed left ankle fracture, sequela S82.892S    Other chronic osteomyelitis, left tibia and fibula M86.662    Bilateral hip pain - Primary M25.551, M25.552     Other Visit Diagnoses         Codes    Synovitis of left knee     M65.962            General  Reason for Referral: bilat hip pain  Referred By: Gab MADRIGAL  Past Medical History Relevant to Rehab: chronic osteomyelitis left tibia/fibula, left lower extremity pain, closed left ankle fracture. Patient was receiving home health physical therapy from 6/2024-9/2024 post Saucerization left tibia, Removal deep implant left tibia, Injection of intramedullary orthopedic bone cement void filler, Excisional debridement of skin subcutaneous tissue and fascia less than 20 cm² distal tibia, Excisional debridement of skin and subcutaneous tissue less than 20 cm² proximal tibia, Complex closure 5 cm in length distal right tibia requiring the undermining of skin flaps and tissue advancement on 6/26/2024.    Subjective   Current Condition:   Worse today.  Patient reports increased left knee pain following Monday's appointment. Patient states that his pain has been 6-7/10 since the last appointment. Patient states that the pain increased to the point of having to use his single point cane  during gait.     Performing HEP?: No    Precautions  Precautions  LE Weight Bearing Status: Weight Bearing as Tolerated  Medical Precautions: Fall precautions  Pain  Pain Assessment: 0-10  0-10 (Numeric) Pain Score: 4  Pain Location: Knee  Pain Orientation: Left    Objective     Treatments:  Therapeutic Exercise  Therapeutic Exercise Performed: Yes  Therapeutic Exercise Activity 3: STS YTB x5, x6 from elevated surface  Therapeutic Exercise Activity 5: Nu Step Level 8 x5' Seat 12  Therapeutic Exercise Activity 6: Hamstring curl 70# x40 L  Therapeutic Exercise Activity 8: Bridge with black Hip ABD x20  Therapeutic Exercise Activity 13: step ups Wii 3x3              EDUCATION:   Individual(s) Educated: Patient  Education Provided: yes  Handout(s) Provided: Scanned into chart  Home Program: reviewed home exercise program   Risk and Benefits Discussed with Patient/Caregiver/Other: Yes   Patient/Caregiver Demonstrated Understanding: Yes   Patient Response to Education: Patient/Caregiver verbalized understanding of information, Patient/Caregiver performed return demonstration of exercises/activities, and Patient/Caregiver asked appropriate questions    Assessment: Patient demonstrated decreased tolerance to exercises today due to left knee pain. Patient's exercises were adjusted to decrease resistance due to patient's increased knee pain. Patient states that his pain is slightly elevated post today's physical therapy session compared to arriving today. Patient demonstrated decreased control during step ups today on the Wii step with occasional buckling or left hip internal rotation. Patient verbalized difficulty walking outside in his driveway. Patient demonstrates good ability while walking on the treadmill during physical therapy sessions. Spoke with patient regarding access to a treadmill for home use to progress his walking and activity tolerance. Patient states that he will look into getting a treadmill for home use.      PT Assessment  PT Assessment Results: Decreased strength, Decreased range of motion, Impaired balance, Decreased mobility, Pain  Rehab Prognosis: Good  Evaluation/Treatment Tolerance: Patient tolerated treatment well    Plan: Continue with POC. Progress exercises as tolerated.  OP PT Plan  Treatment/Interventions: Education/ Instruction, Gait training, Manual therapy, Neuromuscular re-education, Therapeutic activities, Therapeutic exercises  PT Plan: Skilled PT  PT Frequency: 3 times per week  Duration: 5 weeks  Onset Date: 12/26/24  Certification Period Start Date: 01/07/25  Certification Period End Date: 04/18/25  Number of Treatments Authorized: 20 of 30  Rehab Potential: Good  Plan of Care Agreement: Patient    Goals:  Active       PT Problem       Patient will demonstrate improved hamstring flexibility in bilateral lower extremities WFL. (Met)       Start:  01/06/25    Expected End:  03/17/25    Resolved:  03/14/25         Patient will maintain Tandem Stance for at least 30 sec with no upper extremity support. (Met)       Start:  01/06/25    Expected End:  02/03/25    Resolved:  02/10/25         Patient will ambulate with normal gait pattern with no device community distances. (Progressing)       Start:  01/06/25    Expected End:  04/18/25            Patient will ascend and descend 1 flight of stairs with rail modified independent and reciprocal pattern. (Not Progressing)       Start:  01/06/25    Expected End:  04/18/25            Patient will achieve bilateral knee ROM of  0-130 degrees  (Met)       Start:  01/06/25    Expected End:  02/03/25    Resolved:  02/10/25         Patient will achieve left hip flexion strength of at least 4+/5 (Met)       Start:  01/06/25    Expected End:  02/03/25    Resolved:  02/10/25         Patient will achieve left hip abduction strength of at least 4+/5 (Met)       Start:  01/06/25    Expected End:  02/03/25    Resolved:  02/10/25         Patient will achieve left knee  extension strength of at least 4+/5 (Met)       Start:  01/06/25    Expected End:  02/03/25    Resolved:  02/10/25         Patient will demonstrate independence in home program for support of progression (Met)       Start:  01/06/25    Expected End:  02/03/25    Resolved:  02/10/25         Patient will report pain of no more than 2/10 demonstrating a reduction of overall pain (Progressing)       Start:  01/06/25    Expected End:  04/18/25            Patient will show a significant change in LEFS (26 to 35) patient reported outcome tool to demonstrate subjective imporovement (Progressing)       Start:  01/06/25    Expected End:  04/18/25            Patient will be able to perform sit to stand from regular chair height without use of bilateral upper extremities. (Not Progressing)       Start:  02/10/25    Expected End:  04/18/25                Patient will be able to perform single leg stance on each lower extremity for at least 20 seconds without upper extremity support. (Not Progressing)       Start:  02/11/25    Expected End:  04/18/25                     Bonifacio Nicole, PT

## 2025-03-28 ENCOUNTER — APPOINTMENT (OUTPATIENT)
Dept: PHYSICAL THERAPY | Facility: HOSPITAL | Age: 40
End: 2025-03-28
Payer: MEDICAID

## 2025-04-09 ENCOUNTER — APPOINTMENT (OUTPATIENT)
Dept: PHYSICAL THERAPY | Facility: HOSPITAL | Age: 40
End: 2025-04-09
Payer: MEDICAID

## 2025-04-11 ENCOUNTER — DOCUMENTATION (OUTPATIENT)
Dept: PHYSICAL THERAPY | Facility: HOSPITAL | Age: 40
End: 2025-04-11
Payer: MEDICAID

## 2025-04-11 ENCOUNTER — APPOINTMENT (OUTPATIENT)
Dept: PHYSICAL THERAPY | Facility: HOSPITAL | Age: 40
End: 2025-04-11
Payer: MEDICAID

## 2025-04-11 NOTE — PROGRESS NOTES
Physical Therapy                 Therapy Communication Note    Patient Name: Mark Dao  MRN: [id number]  Department:   Room: Room/bed info not found  Today's Date: 4/11/2025     Discipline: Physical Therapy          Missed Visit Reason:  via MyChart    Missed Time: Cancel 3:00

## 2025-04-14 ENCOUNTER — APPOINTMENT (OUTPATIENT)
Dept: PHYSICAL THERAPY | Facility: HOSPITAL | Age: 40
End: 2025-04-14
Payer: MEDICAID

## 2025-04-16 ENCOUNTER — APPOINTMENT (OUTPATIENT)
Dept: PHYSICAL THERAPY | Facility: HOSPITAL | Age: 40
End: 2025-04-16
Payer: MEDICAID

## 2025-04-18 ENCOUNTER — APPOINTMENT (OUTPATIENT)
Dept: PHYSICAL THERAPY | Facility: HOSPITAL | Age: 40
End: 2025-04-18
Payer: MEDICAID

## 2025-04-29 ENCOUNTER — OFFICE VISIT (OUTPATIENT)
Dept: GASTROENTEROLOGY | Facility: CLINIC | Age: 40
End: 2025-04-29
Payer: MEDICAID

## 2025-04-29 VITALS
WEIGHT: 269 LBS | DIASTOLIC BLOOD PRESSURE: 77 MMHG | TEMPERATURE: 97.7 F | HEART RATE: 94 BPM | BODY MASS INDEX: 32.76 KG/M2 | SYSTOLIC BLOOD PRESSURE: 115 MMHG | HEIGHT: 76 IN

## 2025-04-29 DIAGNOSIS — K21.9 GASTROESOPHAGEAL REFLUX DISEASE WITH HIATAL HERNIA: Primary | ICD-10-CM

## 2025-04-29 DIAGNOSIS — K44.9 GASTROESOPHAGEAL REFLUX DISEASE WITH HIATAL HERNIA: Primary | ICD-10-CM

## 2025-04-29 DIAGNOSIS — Z72.0 TOBACCO USE: ICD-10-CM

## 2025-04-29 PROCEDURE — 99214 OFFICE O/P EST MOD 30 MIN: CPT | Performed by: NURSE PRACTITIONER

## 2025-04-29 PROCEDURE — 3008F BODY MASS INDEX DOCD: CPT | Performed by: NURSE PRACTITIONER

## 2025-04-29 RX ORDER — PANTOPRAZOLE SODIUM 40 MG/1
40 TABLET, DELAYED RELEASE ORAL
Qty: 90 TABLET | Refills: 1 | Status: SHIPPED | OUTPATIENT
Start: 2025-04-29 | End: 2025-10-26

## 2025-04-29 ASSESSMENT — ENCOUNTER SYMPTOMS
FATIGUE: 0
ADENOPATHY: 0
SORE THROAT: 0
HEMATURIA: 0
HALLUCINATIONS: 0
MYALGIAS: 0
JOINT SWELLING: 0
FEVER: 0
PHOTOPHOBIA: 0
NUMBNESS: 0
WEAKNESS: 0
WHEEZING: 0
FLANK PAIN: 0
AGITATION: 0
DIZZINESS: 0
ARTHRALGIAS: 0
FREQUENCY: 0
DIAPHORESIS: 0
SHORTNESS OF BREATH: 0
COUGH: 0
EYE PAIN: 0
HEADACHES: 0
PALPITATIONS: 0
DYSURIA: 0
CHILLS: 0
NERVOUS/ANXIOUS: 0
LIGHT-HEADEDNESS: 0
BACK PAIN: 0

## 2025-04-29 ASSESSMENT — PAIN SCALES - GENERAL: PAINLEVEL_OUTOF10: 0-NO PAIN

## 2025-04-29 NOTE — PROGRESS NOTES
Subjective   Patient ID: Mark Dao is a 40 y.o. male who presents for Follow-up.    This is a 40  year old WM with history of tobacco use who is presenting to the GI clinic for follow up. Last seen in the GI clinic on 2/26/25.     History per  patient and review of EMR    Interval history:     EGD 3/12/25 Dr. Loving:    · Irregular Z-line 41 cm from the incisors; performed cold forceps biopsy   · Sliding hiatal hernia (type I hiatal hernia) without Karthikeyan lesions present, confirmed by retroflexion. Tiny hiatal hernia   · Erythematous mucosa in the GE junction. Findings suggestive of LA grade A reflux esophagitis   · Mild, localized erythematous mucosa in the antrum; performed cold forceps biopsy to rule out H. pylori   · Localized nodular mucosa in the duodenal bulb; performed cold forceps biopsy   · The 1st part of the duodenum and 2nd part of the duodenum appeared normal.     A. DUODENUM BIOPSY:   -- Consistent with gastric heterotopia (see note).      B. STOMACH, ANTRUM, BODY, BIOPSY:   -- Gastric mucosa with mild reactive epithelial change and focal minimal chronic inflammation.    -- No Helicobacter pylori organisms identified on routine stained slides.       C. GE JUNCTION BIOPSY:   -- Gastric and squamous esophageal mucosa with reactive epithelial change and acute and chronic inflammation.   -- No intestinal metaplasia identified.      -- See note.      Note for A and C:   : Dr. KIMBERLI Burris       Reports heartburn if he forgets to take pantoprazole.  He's  been taking pantoprazole for many months.  He would like to eventually come off pantoprazole.  He is nearly out of pantoprazole and he is asking for a refill today.  He is interested in getting surgery for his hiatal hernia because of his acid reflux.    Denies unintentional weight loss, odynophagia, dysphagia, abdominal pain, vomiting, diarrhea, constipation, hematemesis, hematochezia, and melena.       Review of Systems  "  Constitutional:  Negative for chills, diaphoresis, fatigue and fever.   HENT:  Negative for congestion, ear pain, hearing loss, sneezing and sore throat.    Eyes:  Negative for photophobia, pain and visual disturbance.   Respiratory:  Negative for cough, shortness of breath and wheezing.    Cardiovascular:  Negative for chest pain, palpitations and leg swelling.   Endocrine: Negative for cold intolerance and heat intolerance.   Genitourinary:  Negative for dysuria, flank pain, frequency and hematuria.   Musculoskeletal:  Negative for arthralgias, back pain, gait problem, joint swelling and myalgias.   Skin:  Negative for rash.   Neurological:  Negative for dizziness, syncope, weakness, light-headedness, numbness and headaches.   Hematological:  Negative for adenopathy.   Psychiatric/Behavioral:  Negative for agitation and hallucinations. The patient is not nervous/anxious.        Allergies[1]    Current Medications[2]     Objective     /77   Pulse 94   Temp 36.5 °C (97.7 °F) (Temporal)   Ht 1.93 m (6' 4\")   Wt 122 kg (269 lb)   BMI 32.74 kg/m²     Physical Exam  Constitutional:       General: He is not in acute distress.     Appearance: Normal appearance.      Comments: Walks with a cane   HENT:      Head: Normocephalic and atraumatic.   Eyes:      Conjunctiva/sclera: Conjunctivae normal.   Cardiovascular:      Rate and Rhythm: Normal rate and regular rhythm.      Heart sounds: No murmur heard.     No gallop.   Pulmonary:      Effort: Pulmonary effort is normal.      Breath sounds: Normal breath sounds.   Abdominal:      General: Bowel sounds are normal. There is no distension.      Tenderness: There is no abdominal tenderness. There is no guarding.   Musculoskeletal:         General: No swelling or deformity. Normal range of motion.      Cervical back: Normal range of motion. No rigidity.   Skin:     General: Skin is warm and dry.      Coloration: Skin is not jaundiced.      Findings: No lesion or " rash.   Neurological:      General: No focal deficit present.      Mental Status: He is alert and oriented to person, place, and time.   Psychiatric:         Mood and Affect: Mood normal.         Assessment/Plan   Problem List Items Addressed This Visit    None  Visit Diagnoses         Gastroesophageal reflux disease with hiatal hernia    -  Primary    Relevant Medications    pantoprazole (ProtoNix) 40 mg EC tablet    Other Relevant Orders    Referral to General Surgery      Tobacco use               1.  GERD with hiatal hernia: Patient seems adamant about getting his hiatal hernia repaired.  Although his hiatal hernia was noted as being small, he may be a candidate for repair given his persistent heartburn.  - Refer to Dr. Tolentino in general surgery for consideration of hiatal hernia repair  - Refill pantoprazole 40 mg once daily (30-60 minutes prior to breakfast)  - Reinforce acid reflux dietary precautions  - Reinforce tobacco cessation    2.  Tobacco use:  - Recommend cessation as above    3.  Follow-up:  - Will be based upon the above         [1] No Known Allergies  [2]   Current Outpatient Medications   Medication Sig Dispense Refill    pantoprazole (ProtoNix) 40 mg EC tablet Take 1 tablet (40 mg) by mouth once daily in the morning. Take before meals. Take 30-60 minutes prior to breakfast. Do not crush, chew, or split. 90 tablet 1     No current facility-administered medications for this visit.

## 2025-04-29 NOTE — PATIENT INSTRUCTIONS
Thanks for coming to the GI clinic.     Please call [phone] to schedule an appointment with Dr. Tolentino in general surgery for your hiatal hernia.     Continue pantoprazole 40 mg once daily (to be taken 30-60 minutes prior to breakfast). I refilled this.      Try to adhere to acid reflux dietary precautions.      Try to stop using tobacco as it causes acid reflux.      Follow up will be based upon the above.

## 2025-05-12 ENCOUNTER — APPOINTMENT (OUTPATIENT)
Dept: SURGERY | Facility: CLINIC | Age: 40
End: 2025-05-12
Payer: MEDICAID

## 2025-05-12 VITALS
HEART RATE: 88 BPM | WEIGHT: 271 LBS | SYSTOLIC BLOOD PRESSURE: 126 MMHG | DIASTOLIC BLOOD PRESSURE: 85 MMHG | BODY MASS INDEX: 32.99 KG/M2

## 2025-05-12 DIAGNOSIS — K44.9 GASTROESOPHAGEAL REFLUX DISEASE WITH HIATAL HERNIA: ICD-10-CM

## 2025-05-12 DIAGNOSIS — K21.9 GASTROESOPHAGEAL REFLUX DISEASE WITH HIATAL HERNIA: ICD-10-CM

## 2025-05-12 PROCEDURE — 99204 OFFICE O/P NEW MOD 45 MIN: CPT | Performed by: SURGERY

## 2025-05-12 ASSESSMENT — ENCOUNTER SYMPTOMS
ABDOMINAL DISTENTION: 0
CONSTIPATION: 0
DIARRHEA: 0
ABDOMINAL PAIN: 0
NAUSEA: 0
SHORTNESS OF BREATH: 0
FEVER: 0
ROS GI COMMENTS: + HEARTBURN
VOMITING: 0
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
## 2025-06-10 ENCOUNTER — ANESTHESIA EVENT (OUTPATIENT)
Dept: OPERATING ROOM | Facility: HOSPITAL | Age: 40
End: 2025-06-10
Payer: MEDICAID

## 2025-06-11 ENCOUNTER — HOSPITAL ENCOUNTER (OUTPATIENT)
Dept: OPERATING ROOM | Facility: HOSPITAL | Age: 40
Setting detail: OUTPATIENT SURGERY
Discharge: HOME | End: 2025-06-11
Payer: MEDICAID

## 2025-06-11 ENCOUNTER — ANESTHESIA (OUTPATIENT)
Dept: OPERATING ROOM | Facility: HOSPITAL | Age: 40
End: 2025-06-11
Payer: MEDICAID

## 2025-06-11 VITALS
HEIGHT: 76 IN | BODY MASS INDEX: 31.76 KG/M2 | DIASTOLIC BLOOD PRESSURE: 71 MMHG | OXYGEN SATURATION: 97 % | HEART RATE: 73 BPM | SYSTOLIC BLOOD PRESSURE: 119 MMHG | RESPIRATION RATE: 16 BRPM | TEMPERATURE: 98.2 F | WEIGHT: 260.8 LBS

## 2025-06-11 DIAGNOSIS — K44.9 HIATAL HERNIA: ICD-10-CM

## 2025-06-11 DIAGNOSIS — K21.9 GERD WITHOUT ESOPHAGITIS: ICD-10-CM

## 2025-06-11 PROCEDURE — 3700000001 HC GENERAL ANESTHESIA TIME - INITIAL BASE CHARGE: Performed by: ANESTHESIOLOGY

## 2025-06-11 PROCEDURE — 2720000007 HC OR 272 NO HCPCS: Performed by: ANESTHESIOLOGY

## 2025-06-11 PROCEDURE — 43235 EGD DIAGNOSTIC BRUSH WASH: CPT | Performed by: INTERNAL MEDICINE

## 2025-06-11 PROCEDURE — 3600000007 HC OR TIME - EACH INCREMENTAL 1 MINUTE - PROCEDURE LEVEL TWO: Performed by: ANESTHESIOLOGY

## 2025-06-11 PROCEDURE — 3700000002 HC GENERAL ANESTHESIA TIME - EACH INCREMENTAL 1 MINUTE: Performed by: ANESTHESIOLOGY

## 2025-06-11 PROCEDURE — 2500000004 HC RX 250 GENERAL PHARMACY W/ HCPCS (ALT 636 FOR OP/ED): Performed by: ANESTHESIOLOGY

## 2025-06-11 PROCEDURE — A43257 PR EDG DELIVER THERMAL ENERGY SPHNCTR/CARDIA GERD: Performed by: NURSE ANESTHETIST, CERTIFIED REGISTERED

## 2025-06-11 PROCEDURE — 3600000002 HC OR TIME - INITIAL BASE CHARGE - PROCEDURE LEVEL TWO: Performed by: ANESTHESIOLOGY

## 2025-06-11 PROCEDURE — 2500000004 HC RX 250 GENERAL PHARMACY W/ HCPCS (ALT 636 FOR OP/ED): Performed by: NURSE ANESTHETIST, CERTIFIED REGISTERED

## 2025-06-11 PROCEDURE — A43257 PR EDG DELIVER THERMAL ENERGY SPHNCTR/CARDIA GERD: Performed by: ANESTHESIOLOGY

## 2025-06-11 PROCEDURE — 7100000010 HC PHASE TWO TIME - EACH INCREMENTAL 1 MINUTE: Performed by: ANESTHESIOLOGY

## 2025-06-11 PROCEDURE — 7100000009 HC PHASE TWO TIME - INITIAL BASE CHARGE: Performed by: ANESTHESIOLOGY

## 2025-06-11 RX ORDER — ONDANSETRON HYDROCHLORIDE 2 MG/ML
4 INJECTION, SOLUTION INTRAVENOUS ONCE AS NEEDED
Status: DISCONTINUED | OUTPATIENT
Start: 2025-06-11 | End: 2025-06-12 | Stop reason: HOSPADM

## 2025-06-11 RX ORDER — LIDOCAINE HYDROCHLORIDE 10 MG/ML
INJECTION, SOLUTION EPIDURAL; INFILTRATION; INTRACAUDAL; PERINEURAL AS NEEDED
Status: DISCONTINUED | OUTPATIENT
Start: 2025-06-11 | End: 2025-06-11

## 2025-06-11 RX ORDER — FENTANYL CITRATE 50 UG/ML
INJECTION, SOLUTION INTRAMUSCULAR; INTRAVENOUS AS NEEDED
Status: DISCONTINUED | OUTPATIENT
Start: 2025-06-11 | End: 2025-06-11

## 2025-06-11 RX ORDER — SODIUM CHLORIDE, SODIUM LACTATE, POTASSIUM CHLORIDE, CALCIUM CHLORIDE 600; 310; 30; 20 MG/100ML; MG/100ML; MG/100ML; MG/100ML
20 INJECTION, SOLUTION INTRAVENOUS CONTINUOUS
Status: DISCONTINUED | OUTPATIENT
Start: 2025-06-11 | End: 2025-06-12 | Stop reason: HOSPADM

## 2025-06-11 RX ORDER — PROPOFOL 10 MG/ML
INJECTION, EMULSION INTRAVENOUS AS NEEDED
Status: DISCONTINUED | OUTPATIENT
Start: 2025-06-11 | End: 2025-06-11

## 2025-06-11 RX ORDER — DROPERIDOL 2.5 MG/ML
0.62 INJECTION, SOLUTION INTRAMUSCULAR; INTRAVENOUS ONCE AS NEEDED
Status: DISCONTINUED | OUTPATIENT
Start: 2025-06-11 | End: 2025-06-12 | Stop reason: HOSPADM

## 2025-06-11 RX ORDER — SODIUM CHLORIDE, SODIUM LACTATE, POTASSIUM CHLORIDE, CALCIUM CHLORIDE 600; 310; 30; 20 MG/100ML; MG/100ML; MG/100ML; MG/100ML
INJECTION, SOLUTION INTRAVENOUS CONTINUOUS PRN
Status: DISCONTINUED | OUTPATIENT
Start: 2025-06-11 | End: 2025-06-11

## 2025-06-11 RX ORDER — MIDAZOLAM HYDROCHLORIDE 1 MG/ML
INJECTION, SOLUTION INTRAMUSCULAR; INTRAVENOUS AS NEEDED
Status: DISCONTINUED | OUTPATIENT
Start: 2025-06-11 | End: 2025-06-11

## 2025-06-11 RX ADMIN — FENTANYL CITRATE 25 MCG: 50 INJECTION, SOLUTION INTRAMUSCULAR; INTRAVENOUS at 07:53

## 2025-06-11 RX ADMIN — SODIUM CHLORIDE, POTASSIUM CHLORIDE, SODIUM LACTATE AND CALCIUM CHLORIDE 20 ML/HR: 600; 310; 30; 20 INJECTION, SOLUTION INTRAVENOUS at 07:19

## 2025-06-11 RX ADMIN — MIDAZOLAM 2 MG: 1 INJECTION INTRAMUSCULAR; INTRAVENOUS at 07:44

## 2025-06-11 RX ADMIN — PROPOFOL 50 MG: 10 INJECTION, EMULSION INTRAVENOUS at 07:47

## 2025-06-11 RX ADMIN — PROPOFOL 20 MG: 10 INJECTION, EMULSION INTRAVENOUS at 07:48

## 2025-06-11 RX ADMIN — SODIUM CHLORIDE, POTASSIUM CHLORIDE, SODIUM LACTATE AND CALCIUM CHLORIDE: 600; 310; 30; 20 INJECTION, SOLUTION INTRAVENOUS at 07:05

## 2025-06-11 RX ADMIN — LIDOCAINE HYDROCHLORIDE 50 MG: 10 INJECTION, SOLUTION EPIDURAL; INFILTRATION; INTRACAUDAL; PERINEURAL at 07:45

## 2025-06-11 RX ADMIN — FENTANYL CITRATE 50 MCG: 50 INJECTION, SOLUTION INTRAMUSCULAR; INTRAVENOUS at 07:44

## 2025-06-11 RX ADMIN — PROPOFOL 50 MG: 10 INJECTION, EMULSION INTRAVENOUS at 07:45

## 2025-06-11 RX ADMIN — SODIUM CHLORIDE, POTASSIUM CHLORIDE, SODIUM LACTATE AND CALCIUM CHLORIDE: 600; 310; 30; 20 INJECTION, SOLUTION INTRAVENOUS at 07:40

## 2025-06-11 RX ADMIN — PROPOFOL 20 MG: 10 INJECTION, EMULSION INTRAVENOUS at 07:52

## 2025-06-11 SDOH — HEALTH STABILITY: MENTAL HEALTH: CURRENT SMOKER: 0

## 2025-06-11 ASSESSMENT — PAIN SCALES - GENERAL
PAINLEVEL_OUTOF10: 2

## 2025-06-11 ASSESSMENT — COLUMBIA-SUICIDE SEVERITY RATING SCALE - C-SSRS
6. HAVE YOU EVER DONE ANYTHING, STARTED TO DO ANYTHING, OR PREPARED TO DO ANYTHING TO END YOUR LIFE?: NO
2. HAVE YOU ACTUALLY HAD ANY THOUGHTS OF KILLING YOURSELF?: NO
1. IN THE PAST MONTH, HAVE YOU WISHED YOU WERE DEAD OR WISHED YOU COULD GO TO SLEEP AND NOT WAKE UP?: NO

## 2025-06-11 ASSESSMENT — PAIN - FUNCTIONAL ASSESSMENT
PAIN_FUNCTIONAL_ASSESSMENT: 0-10
PAIN_FUNCTIONAL_ASSESSMENT: 0-10

## 2025-06-11 NOTE — DISCHARGE INSTRUCTIONS

## 2025-06-11 NOTE — ANESTHESIA PREPROCEDURE EVALUATION
Patient: Mark Dao    Procedure Information       Date/Time: 06/11/25 0730    Scheduled providers: Parminder Loving MD; Madi Adrian MD    Procedures:       EGD      BRAVO    Location: [facility] OR            Relevant Problems   Anesthesia (within normal limits)      Cardiac (within normal limits)      Pulmonary (within normal limits)      Neuro (within normal limits)      GI (within normal limits)      /Renal (within normal limits)      Liver (within normal limits)      Endocrine (within normal limits)      Hematology (within normal limits)      Musculoskeletal (within normal limits)      HEENT (within normal limits)      ID   (+) Other chronic osteomyelitis, left tibia and fibula      Skin (within normal limits)       Clinical information reviewed:   Tobacco  Allergies  Meds   Med Hx  Surg Hx   Fam Hx  Soc Hx        NPO Detail:  NPO/Void Status  Date of Last Liquid: 06/10/25  Date of Last Solid: 06/10/25         Physical Exam    Airway  Mallampati: I  TM distance: >3 FB  Neck ROM: full  Mouth opening: 3 or more finger widths     Cardiovascular - normal exam   Dental - normal exam     Pulmonary - normal exam   Abdominal - normal exam           Anesthesia Plan    History of general anesthesia?: yes  History of complications of general anesthesia?: no    ASA 2     MAC     The patient is not a current smoker.    intravenous induction   Anesthetic plan and risks discussed with patient.

## 2025-06-11 NOTE — ANESTHESIA POSTPROCEDURE EVALUATION
Patient: Mark Dao    Procedure Summary       Date: 06/11/25 Room / Location: [facility] OR    Anesthesia Start: 0741 Anesthesia Stop: 0803    Procedures:       EGD      BRAVO Diagnosis:       GERD without esophagitis      Hiatal hernia    Scheduled Providers: Parminder Loving MD; Madi Adrian MD Responsible Provider: Madi Adrian MD    Anesthesia Type: MAC ASA Status: 2            Anesthesia Type: MAC    Vitals Value Taken Time   /71 06/11/25 08:30   Temp 36.8 °C (98.2 °F) 06/11/25 08:01   Pulse 73 06/11/25 08:30   Resp 16 06/11/25 08:30   SpO2 97 % 06/11/25 08:30       Anesthesia Post Evaluation    Patient location during evaluation: bedside  Patient participation: complete - patient participated  Level of consciousness: awake and alert  Pain management: adequate  Airway patency: patent  Cardiovascular status: acceptable  Respiratory status: acceptable  Hydration status: acceptable  Postoperative Nausea and Vomiting: none        No notable events documented.

## 2025-06-20 DIAGNOSIS — K44.9 GASTROESOPHAGEAL REFLUX DISEASE WITH HIATAL HERNIA: Primary | ICD-10-CM

## 2025-06-20 DIAGNOSIS — K21.9 GASTROESOPHAGEAL REFLUX DISEASE WITH HIATAL HERNIA: Primary | ICD-10-CM

## 2025-06-23 ENCOUNTER — APPOINTMENT (OUTPATIENT)
Dept: SURGERY | Facility: CLINIC | Age: 40
End: 2025-06-23
Payer: MEDICAID

## 2025-06-23 DIAGNOSIS — K21.9 GASTROESOPHAGEAL REFLUX DISEASE, UNSPECIFIED WHETHER ESOPHAGITIS PRESENT: ICD-10-CM

## 2025-06-23 DIAGNOSIS — K44.9 GASTROESOPHAGEAL REFLUX DISEASE WITH HIATAL HERNIA: Primary | ICD-10-CM

## 2025-06-23 DIAGNOSIS — K21.9 GASTROESOPHAGEAL REFLUX DISEASE WITH HIATAL HERNIA: Primary | ICD-10-CM

## 2025-06-23 PROCEDURE — 99213 OFFICE O/P EST LOW 20 MIN: CPT | Performed by: SURGERY

## 2025-06-23 NOTE — PROGRESS NOTES
Subjective   Patient ID: Mark Dao is a 40 y.o. male who presents for discussion of endoscopic and Bravo capsule results. A telephone visit (audio only) between the patient and the provider was utilized to provide this telehealth service.  Verbal consent was requested and obtained from the patient on this date, 06/23/25, for a telehealth visit. This communication lasted 20 minutes.    Assessment and Plan  Patient is a 40 y.o. male who presents for a virtual visit to discuss his options available following his positive Bravo capsule results.  .  Patient is a 40-year-old male with longstanding history of gastroesophageal reflux disease who underwent upper endoscopy and Bravo capsule placement off medications.  His Bravo capsule showed a DeMeester of 41 with a 11.7% acid exposure time.  Both of these are consistent with significant pathologic exposure of acid in his esophagus.        Review of Systemsn/c    Objective   Physical Examn/a    Assessment/Plan     We had a lengthy discussion regarding the medical, endoscopic, and surgical options available for treatment of his GERD and pathologic acid exposure.  He is opted for laparoscopic partial fundoplication and the risks, benefits, alternatives, and complications were explained at length including bleeding, infection, viscus injury, conversion, dysphagia, gas bloat, inability to belch or vomit, increased flatulence, weight loss, recurrent hernia, possible nonresolution or recurrence in symptoms, and possibly for subsequent endoscopic or surgical intervention.  He will be scheduled electively for laparoscopic partial fundoplication under general anesthesia in the near future.         Mark Tolentino MD 06/23/25 3:22 PM

## 2025-07-17 ENCOUNTER — APPOINTMENT (OUTPATIENT)
Dept: PRIMARY CARE | Facility: CLINIC | Age: 40
End: 2025-07-17
Payer: MEDICAID

## 2025-07-17 VITALS
OXYGEN SATURATION: 98 % | DIASTOLIC BLOOD PRESSURE: 82 MMHG | WEIGHT: 273.5 LBS | TEMPERATURE: 97.4 F | HEIGHT: 76 IN | BODY MASS INDEX: 33.31 KG/M2 | SYSTOLIC BLOOD PRESSURE: 116 MMHG | HEART RATE: 100 BPM

## 2025-07-17 DIAGNOSIS — R73.9 HYPERGLYCEMIA, UNSPECIFIED: ICD-10-CM

## 2025-07-17 DIAGNOSIS — M25.552 CHRONIC HIP PAIN, BILATERAL: ICD-10-CM

## 2025-07-17 DIAGNOSIS — Z13.220 SCREENING FOR HYPERLIPIDEMIA: ICD-10-CM

## 2025-07-17 DIAGNOSIS — Z00.00 ROUTINE GENERAL MEDICAL EXAMINATION AT A HEALTH CARE FACILITY: Primary | ICD-10-CM

## 2025-07-17 DIAGNOSIS — G89.29 CHRONIC BILATERAL LOW BACK PAIN WITHOUT SCIATICA: ICD-10-CM

## 2025-07-17 DIAGNOSIS — M86.662 OTHER CHRONIC OSTEOMYELITIS, LEFT TIBIA AND FIBULA: ICD-10-CM

## 2025-07-17 DIAGNOSIS — M25.551 CHRONIC HIP PAIN, BILATERAL: ICD-10-CM

## 2025-07-17 DIAGNOSIS — R53.83 OTHER FATIGUE: ICD-10-CM

## 2025-07-17 DIAGNOSIS — G89.29 CHRONIC HIP PAIN, BILATERAL: ICD-10-CM

## 2025-07-17 DIAGNOSIS — M54.50 CHRONIC BILATERAL LOW BACK PAIN WITHOUT SCIATICA: ICD-10-CM

## 2025-07-17 PROCEDURE — 99395 PREV VISIT EST AGE 18-39: CPT

## 2025-07-17 PROCEDURE — 3008F BODY MASS INDEX DOCD: CPT

## 2025-07-17 PROCEDURE — 99213 OFFICE O/P EST LOW 20 MIN: CPT
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
## 2025-07-17 NOTE — PATIENT INSTRUCTIONS
Continue to follow with GI specialist  Future labs at outpatient lab with XR    Pain med referral Dr. Delatorre in [place]  Obtain XR of low back and hips    Thank you for coming in today, if any questions or concerns arise, please call my office.   ALFREDA Joaquin-CNP

## 2025-07-21 ENCOUNTER — HOSPITAL ENCOUNTER (OUTPATIENT)
Dept: CARDIOLOGY | Facility: HOSPITAL | Age: 40
Discharge: HOME | End: 2025-07-21
Payer: MEDICAID

## 2025-07-21 ENCOUNTER — HOSPITAL ENCOUNTER (OUTPATIENT)
Dept: RADIOLOGY | Facility: HOSPITAL | Age: 40
Discharge: HOME | End: 2025-07-21
Payer: MEDICAID

## 2025-07-21 DIAGNOSIS — G89.29 CHRONIC BILATERAL LOW BACK PAIN WITHOUT SCIATICA: ICD-10-CM

## 2025-07-21 DIAGNOSIS — M25.551 CHRONIC HIP PAIN, BILATERAL: ICD-10-CM

## 2025-07-21 DIAGNOSIS — K21.9 GASTROESOPHAGEAL REFLUX DISEASE, UNSPECIFIED WHETHER ESOPHAGITIS PRESENT: ICD-10-CM

## 2025-07-21 DIAGNOSIS — M54.50 CHRONIC BILATERAL LOW BACK PAIN WITHOUT SCIATICA: ICD-10-CM

## 2025-07-21 DIAGNOSIS — G89.29 CHRONIC HIP PAIN, BILATERAL: ICD-10-CM

## 2025-07-21 DIAGNOSIS — M25.552 CHRONIC HIP PAIN, BILATERAL: ICD-10-CM

## 2025-07-21 LAB
ATRIAL RATE: 70 BPM
P AXIS: 37 DEGREES
P OFFSET: 197 MS
P ONSET: 147 MS
PR INTERVAL: 146 MS
Q ONSET: 220 MS
QRS COUNT: 12 BEATS
QRS DURATION: 84 MS
QT INTERVAL: 372 MS
QTC CALCULATION(BAZETT): 401 MS
QTC FREDERICIA: 391 MS
R AXIS: 20 DEGREES
T AXIS: 50 DEGREES
T OFFSET: 406 MS
VENTRICULAR RATE: 70 BPM

## 2025-07-21 PROCEDURE — 72110 X-RAY EXAM L-2 SPINE 4/>VWS: CPT | Performed by: STUDENT IN AN ORGANIZED HEALTH CARE EDUCATION/TRAINING PROGRAM

## 2025-07-21 PROCEDURE — 93005 ELECTROCARDIOGRAM TRACING: CPT

## 2025-07-21 PROCEDURE — 73521 X-RAY EXAM HIPS BI 2 VIEWS: CPT | Mod: BILATERAL PROCEDURE | Performed by: STUDENT IN AN ORGANIZED HEALTH CARE EDUCATION/TRAINING PROGRAM

## 2025-07-21 PROCEDURE — 72110 X-RAY EXAM L-2 SPINE 4/>VWS: CPT

## 2025-07-21 PROCEDURE — 73521 X-RAY EXAM HIPS BI 2 VIEWS: CPT

## 2025-07-22 LAB
ALBUMIN SERPL-MCNC: 4.5 G/DL (ref 3.6–5.1)
ALP SERPL-CCNC: 60 U/L (ref 36–130)
ALT SERPL-CCNC: 23 U/L (ref 9–46)
ANION GAP SERPL CALCULATED.4IONS-SCNC: 12 MMOL/L (CALC) (ref 7–17)
APTT PPP: 28 SEC (ref 23–32)
AST SERPL-CCNC: 15 U/L (ref 10–40)
BILIRUB SERPL-MCNC: 0.5 MG/DL (ref 0.2–1.2)
BUN SERPL-MCNC: 25 MG/DL (ref 7–25)
CALCIUM SERPL-MCNC: 9.5 MG/DL (ref 8.6–10.3)
CHLORIDE SERPL-SCNC: 105 MMOL/L (ref 98–110)
CHOLEST SERPL-MCNC: 228 MG/DL
CHOLEST/HDLC SERPL: 8.1 (CALC)
CO2 SERPL-SCNC: 20 MMOL/L (ref 20–32)
CREAT SERPL-MCNC: 1.25 MG/DL (ref 0.6–1.26)
EGFRCR SERPLBLD CKD-EPI 2021: 75 ML/MIN/1.73M2
ERYTHROCYTE [DISTWIDTH] IN BLOOD BY AUTOMATED COUNT: 18.3 % (ref 11–15)
EST. AVERAGE GLUCOSE BLD GHB EST-MCNC: 108 MG/DL
EST. AVERAGE GLUCOSE BLD GHB EST-SCNC: 6 MMOL/L
GLUCOSE SERPL-MCNC: 99 MG/DL (ref 65–139)
HBA1C MFR BLD: 5.4 %
HCT VFR BLD AUTO: 52.5 % (ref 38.5–50)
HDLC SERPL-MCNC: 28 MG/DL
HGB BLD-MCNC: 16.4 G/DL (ref 13.2–17.1)
INR PPP: 1
LDLC SERPL CALC-MCNC: ABNORMAL MG/DL
MCH RBC QN AUTO: 26.7 PG (ref 27–33)
MCHC RBC AUTO-ENTMCNC: 31.2 G/DL (ref 32–36)
MCV RBC AUTO: 85.5 FL (ref 80–100)
NONHDLC SERPL-MCNC: 200 MG/DL (CALC)
PLATELET # BLD AUTO: 258 THOUSAND/UL (ref 140–400)
PMV BLD REES-ECKER: 11.1 FL (ref 7.5–12.5)
POTASSIUM SERPL-SCNC: 4.5 MMOL/L (ref 3.5–5.3)
PROT SERPL-MCNC: 7.5 G/DL (ref 6.1–8.1)
PROTHROMBIN TIME: 10.8 SEC (ref 9–11.5)
RBC # BLD AUTO: 6.14 MILLION/UL (ref 4.2–5.8)
SODIUM SERPL-SCNC: 137 MMOL/L (ref 135–146)
T4 FREE SERPL-MCNC: 1.2 NG/DL (ref 0.8–1.8)
TRIGL SERPL-MCNC: 530 MG/DL
TSH SERPL-ACNC: 5.04 MIU/L (ref 0.4–4.5)
WBC # BLD AUTO: 10.8 THOUSAND/UL (ref 3.8–10.8)

## 2025-07-24 ENCOUNTER — ANESTHESIA EVENT (OUTPATIENT)
Dept: OPERATING ROOM | Facility: HOSPITAL | Age: 40
End: 2025-07-24
Payer: MEDICAID

## 2025-07-25 ENCOUNTER — APPOINTMENT (OUTPATIENT)
Dept: RADIOLOGY | Facility: HOSPITAL | Age: 40
End: 2025-07-25
Payer: MEDICAID

## 2025-07-25 ENCOUNTER — ANESTHESIA (OUTPATIENT)
Dept: OPERATING ROOM | Facility: HOSPITAL | Age: 40
End: 2025-07-25
Payer: MEDICAID

## 2025-07-25 ENCOUNTER — HOSPITAL ENCOUNTER (OUTPATIENT)
Facility: HOSPITAL | Age: 40
Discharge: HOME | End: 2025-07-26
Attending: SURGERY | Admitting: SURGERY
Payer: MEDICAID

## 2025-07-25 DIAGNOSIS — K21.9 CHRONIC GASTROESOPHAGEAL REFLUX DISEASE: Primary | ICD-10-CM

## 2025-07-25 PROBLEM — K44.9 PARAESOPHAGEAL HERNIA: Status: ACTIVE | Noted: 2025-07-25

## 2025-07-25 LAB
ABO GROUP (TYPE) IN BLOOD: NORMAL
ANTIBODY SCREEN: NORMAL
RH FACTOR (ANTIGEN D): NORMAL

## 2025-07-25 PROCEDURE — 3700000002 HC GENERAL ANESTHESIA TIME - EACH INCREMENTAL 1 MINUTE: Performed by: SURGERY

## 2025-07-25 PROCEDURE — 2500000004 HC RX 250 GENERAL PHARMACY W/ HCPCS (ALT 636 FOR OP/ED): Mod: SE | Performed by: ANESTHESIOLOGY

## 2025-07-25 PROCEDURE — 96372 THER/PROPH/DIAG INJ SC/IM: CPT | Performed by: STUDENT IN AN ORGANIZED HEALTH CARE EDUCATION/TRAINING PROGRAM

## 2025-07-25 PROCEDURE — 3600000009 HC OR TIME - EACH INCREMENTAL 1 MINUTE - PROCEDURE LEVEL FOUR: Performed by: SURGERY

## 2025-07-25 PROCEDURE — 2500000005 HC RX 250 GENERAL PHARMACY W/O HCPCS: Mod: SE

## 2025-07-25 PROCEDURE — 86901 BLOOD TYPING SEROLOGIC RH(D): CPT

## 2025-07-25 PROCEDURE — 2720000007 HC OR 272 NO HCPCS: Performed by: SURGERY

## 2025-07-25 PROCEDURE — 2500000004 HC RX 250 GENERAL PHARMACY W/ HCPCS (ALT 636 FOR OP/ED): Mod: SE | Performed by: SURGERY

## 2025-07-25 PROCEDURE — 2500000001 HC RX 250 WO HCPCS SELF ADMINISTERED DRUGS (ALT 637 FOR MEDICARE OP): Mod: SE | Performed by: STUDENT IN AN ORGANIZED HEALTH CARE EDUCATION/TRAINING PROGRAM

## 2025-07-25 PROCEDURE — 36415 COLL VENOUS BLD VENIPUNCTURE: CPT

## 2025-07-25 PROCEDURE — 3600000004 HC OR TIME - INITIAL BASE CHARGE - PROCEDURE LEVEL FOUR: Performed by: SURGERY

## 2025-07-25 PROCEDURE — 43235 EGD DIAGNOSTIC BRUSH WASH: CPT | Performed by: SURGERY

## 2025-07-25 PROCEDURE — 7100000011 HC EXTENDED STAY RECOVERY HOURLY - NURSING UNIT

## 2025-07-25 PROCEDURE — 3700000001 HC GENERAL ANESTHESIA TIME - INITIAL BASE CHARGE: Performed by: SURGERY

## 2025-07-25 PROCEDURE — 2500000005 HC RX 250 GENERAL PHARMACY W/O HCPCS: Mod: SE | Performed by: SURGERY

## 2025-07-25 PROCEDURE — 7100000001 HC RECOVERY ROOM TIME - INITIAL BASE CHARGE: Performed by: SURGERY

## 2025-07-25 PROCEDURE — 7100000002 HC RECOVERY ROOM TIME - EACH INCREMENTAL 1 MINUTE: Performed by: SURGERY

## 2025-07-25 PROCEDURE — 43280 LAPAROSCOPY FUNDOPLASTY: CPT | Performed by: STUDENT IN AN ORGANIZED HEALTH CARE EDUCATION/TRAINING PROGRAM

## 2025-07-25 PROCEDURE — 43280 LAPAROSCOPY FUNDOPLASTY: CPT | Performed by: SURGERY

## 2025-07-25 PROCEDURE — 2500000004 HC RX 250 GENERAL PHARMACY W/ HCPCS (ALT 636 FOR OP/ED): Mod: JZ,SE

## 2025-07-25 PROCEDURE — 2500000004 HC RX 250 GENERAL PHARMACY W/ HCPCS (ALT 636 FOR OP/ED): Mod: SE

## 2025-07-25 PROCEDURE — 2500000005 HC RX 250 GENERAL PHARMACY W/O HCPCS: Mod: SE | Performed by: STUDENT IN AN ORGANIZED HEALTH CARE EDUCATION/TRAINING PROGRAM

## 2025-07-25 PROCEDURE — 2500000004 HC RX 250 GENERAL PHARMACY W/ HCPCS (ALT 636 FOR OP/ED): Mod: SE | Performed by: STUDENT IN AN ORGANIZED HEALTH CARE EDUCATION/TRAINING PROGRAM

## 2025-07-25 PROCEDURE — 96372 THER/PROPH/DIAG INJ SC/IM: CPT

## 2025-07-25 RX ORDER — OXYCODONE HYDROCHLORIDE 5 MG/1
5 TABLET ORAL EVERY 4 HOURS PRN
Status: DISCONTINUED | OUTPATIENT
Start: 2025-07-25 | End: 2025-07-26

## 2025-07-25 RX ORDER — ONDANSETRON HYDROCHLORIDE 2 MG/ML
INJECTION, SOLUTION INTRAVENOUS AS NEEDED
Status: DISCONTINUED | OUTPATIENT
Start: 2025-07-25 | End: 2025-07-25

## 2025-07-25 RX ORDER — ONDANSETRON 4 MG/1
4 TABLET, ORALLY DISINTEGRATING ORAL EVERY 8 HOURS PRN
Status: DISCONTINUED | OUTPATIENT
Start: 2025-07-25 | End: 2025-07-27 | Stop reason: HOSPADM

## 2025-07-25 RX ORDER — ACETAMINOPHEN 325 MG/1
650 TABLET ORAL EVERY 6 HOURS
Status: DISCONTINUED | OUTPATIENT
Start: 2025-07-25 | End: 2025-07-27 | Stop reason: HOSPADM

## 2025-07-25 RX ORDER — SODIUM CHLORIDE 0.9 G/100ML
INJECTION, SOLUTION IRRIGATION AS NEEDED
Status: DISCONTINUED | OUTPATIENT
Start: 2025-07-25 | End: 2025-07-25 | Stop reason: HOSPADM

## 2025-07-25 RX ORDER — PROCHLORPERAZINE 25 MG/1
25 SUPPOSITORY RECTAL EVERY 12 HOURS PRN
Status: DISCONTINUED | OUTPATIENT
Start: 2025-07-25 | End: 2025-07-27 | Stop reason: HOSPADM

## 2025-07-25 RX ORDER — CEFAZOLIN 1 G/1
INJECTION, POWDER, FOR SOLUTION INTRAVENOUS AS NEEDED
Status: DISCONTINUED | OUTPATIENT
Start: 2025-07-25 | End: 2025-07-25

## 2025-07-25 RX ORDER — PANTOPRAZOLE SODIUM 40 MG/1
40 TABLET, DELAYED RELEASE ORAL
Status: DISCONTINUED | OUTPATIENT
Start: 2025-07-26 | End: 2025-07-27 | Stop reason: HOSPADM

## 2025-07-25 RX ORDER — PROCHLORPERAZINE MALEATE 10 MG
10 TABLET ORAL EVERY 6 HOURS PRN
Status: DISCONTINUED | OUTPATIENT
Start: 2025-07-25 | End: 2025-07-27 | Stop reason: HOSPADM

## 2025-07-25 RX ORDER — METHOCARBAMOL 100 MG/ML
1000 INJECTION, SOLUTION INTRAMUSCULAR; INTRAVENOUS ONCE
Status: COMPLETED | OUTPATIENT
Start: 2025-07-25 | End: 2025-07-25

## 2025-07-25 RX ORDER — FENTANYL CITRATE 50 UG/ML
12.5 INJECTION, SOLUTION INTRAMUSCULAR; INTRAVENOUS EVERY 5 MIN PRN
Status: DISCONTINUED | OUTPATIENT
Start: 2025-07-25 | End: 2025-07-25 | Stop reason: HOSPADM

## 2025-07-25 RX ORDER — ROCURONIUM BROMIDE 10 MG/ML
INJECTION, SOLUTION INTRAVENOUS AS NEEDED
Status: DISCONTINUED | OUTPATIENT
Start: 2025-07-25 | End: 2025-07-25

## 2025-07-25 RX ORDER — MIDAZOLAM HYDROCHLORIDE 2 MG/2ML
INJECTION, SOLUTION INTRAMUSCULAR; INTRAVENOUS AS NEEDED
Status: DISCONTINUED | OUTPATIENT
Start: 2025-07-25 | End: 2025-07-25

## 2025-07-25 RX ORDER — DROPERIDOL 2.5 MG/ML
0.62 INJECTION, SOLUTION INTRAMUSCULAR; INTRAVENOUS ONCE AS NEEDED
Status: DISCONTINUED | OUTPATIENT
Start: 2025-07-25 | End: 2025-07-25 | Stop reason: HOSPADM

## 2025-07-25 RX ORDER — NORETHINDRONE AND ETHINYL ESTRADIOL 0.5-0.035
KIT ORAL AS NEEDED
Status: DISCONTINUED | OUTPATIENT
Start: 2025-07-25 | End: 2025-07-25

## 2025-07-25 RX ORDER — LIDOCAINE HYDROCHLORIDE 20 MG/ML
INJECTION, SOLUTION INFILTRATION; PERINEURAL AS NEEDED
Status: DISCONTINUED | OUTPATIENT
Start: 2025-07-25 | End: 2025-07-25

## 2025-07-25 RX ORDER — ENOXAPARIN SODIUM 100 MG/ML
40 INJECTION SUBCUTANEOUS EVERY 24 HOURS
Status: DISCONTINUED | OUTPATIENT
Start: 2025-07-25 | End: 2025-07-27 | Stop reason: HOSPADM

## 2025-07-25 RX ORDER — SODIUM CHLORIDE, SODIUM LACTATE, POTASSIUM CHLORIDE, CALCIUM CHLORIDE 600; 310; 30; 20 MG/100ML; MG/100ML; MG/100ML; MG/100ML
50 INJECTION, SOLUTION INTRAVENOUS CONTINUOUS
Status: DISCONTINUED | OUTPATIENT
Start: 2025-07-25 | End: 2025-07-25 | Stop reason: HOSPADM

## 2025-07-25 RX ORDER — DROPERIDOL 2.5 MG/ML
INJECTION, SOLUTION INTRAMUSCULAR; INTRAVENOUS AS NEEDED
Status: DISCONTINUED | OUTPATIENT
Start: 2025-07-25 | End: 2025-07-25

## 2025-07-25 RX ORDER — PANTOPRAZOLE SODIUM 40 MG/10ML
40 INJECTION, POWDER, LYOPHILIZED, FOR SOLUTION INTRAVENOUS
Status: DISCONTINUED | OUTPATIENT
Start: 2025-07-26 | End: 2025-07-27 | Stop reason: HOSPADM

## 2025-07-25 RX ORDER — LIDOCAINE 560 MG/1
1 PATCH PERCUTANEOUS; TOPICAL; TRANSDERMAL DAILY
Status: DISCONTINUED | OUTPATIENT
Start: 2025-07-25 | End: 2025-07-26

## 2025-07-25 RX ORDER — METHOCARBAMOL 500 MG/1
500 TABLET, FILM COATED ORAL EVERY 8 HOURS SCHEDULED
Status: DISCONTINUED | OUTPATIENT
Start: 2025-07-25 | End: 2025-07-27 | Stop reason: HOSPADM

## 2025-07-25 RX ORDER — HYDROMORPHONE HYDROCHLORIDE 1 MG/ML
INJECTION, SOLUTION INTRAMUSCULAR; INTRAVENOUS; SUBCUTANEOUS AS NEEDED
Status: DISCONTINUED | OUTPATIENT
Start: 2025-07-25 | End: 2025-07-25

## 2025-07-25 RX ORDER — HYDROMORPHONE HYDROCHLORIDE 0.2 MG/ML
0.2 INJECTION INTRAMUSCULAR; INTRAVENOUS; SUBCUTANEOUS EVERY 5 MIN PRN
Status: DISCONTINUED | OUTPATIENT
Start: 2025-07-25 | End: 2025-07-25 | Stop reason: HOSPADM

## 2025-07-25 RX ORDER — ACETAMINOPHEN 650 MG/1
650 SUPPOSITORY RECTAL EVERY 6 HOURS
Status: DISCONTINUED | OUTPATIENT
Start: 2025-07-25 | End: 2025-07-26 | Stop reason: SDUPTHER

## 2025-07-25 RX ORDER — SODIUM CHLORIDE, SODIUM LACTATE, POTASSIUM CHLORIDE, CALCIUM CHLORIDE 600; 310; 30; 20 MG/100ML; MG/100ML; MG/100ML; MG/100ML
INJECTION, SOLUTION INTRAVENOUS CONTINUOUS PRN
Status: DISCONTINUED | OUTPATIENT
Start: 2025-07-25 | End: 2025-07-25

## 2025-07-25 RX ORDER — POLYETHYLENE GLYCOL 3350 17 G/17G
17 POWDER, FOR SOLUTION ORAL DAILY
Status: DISCONTINUED | OUTPATIENT
Start: 2025-07-27 | End: 2025-07-27 | Stop reason: HOSPADM

## 2025-07-25 RX ORDER — PHENYLEPHRINE HCL IN 0.9% NACL 0.4MG/10ML
SYRINGE (ML) INTRAVENOUS AS NEEDED
Status: DISCONTINUED | OUTPATIENT
Start: 2025-07-25 | End: 2025-07-25

## 2025-07-25 RX ORDER — BUPIVACAINE HYDROCHLORIDE 5 MG/ML
INJECTION, SOLUTION PERINEURAL AS NEEDED
Status: DISCONTINUED | OUTPATIENT
Start: 2025-07-25 | End: 2025-07-25 | Stop reason: HOSPADM

## 2025-07-25 RX ORDER — PROPOFOL 10 MG/ML
INJECTION, EMULSION INTRAVENOUS AS NEEDED
Status: DISCONTINUED | OUTPATIENT
Start: 2025-07-25 | End: 2025-07-25

## 2025-07-25 RX ORDER — LIDOCAINE HYDROCHLORIDE 10 MG/ML
0.1 INJECTION, SOLUTION EPIDURAL; INFILTRATION; INTRACAUDAL; PERINEURAL ONCE
Status: DISCONTINUED | OUTPATIENT
Start: 2025-07-25 | End: 2025-07-25 | Stop reason: HOSPADM

## 2025-07-25 RX ORDER — ACETAMINOPHEN 160 MG/5ML
650 SOLUTION ORAL EVERY 6 HOURS
Status: DISCONTINUED | OUTPATIENT
Start: 2025-07-25 | End: 2025-07-27 | Stop reason: HOSPADM

## 2025-07-25 RX ORDER — HEPARIN SODIUM 5000 [USP'U]/ML
5000 INJECTION, SOLUTION INTRAVENOUS; SUBCUTANEOUS ONCE
Status: COMPLETED | OUTPATIENT
Start: 2025-07-25 | End: 2025-07-25

## 2025-07-25 RX ORDER — ONDANSETRON HYDROCHLORIDE 2 MG/ML
4 INJECTION, SOLUTION INTRAVENOUS EVERY 8 HOURS PRN
Status: DISCONTINUED | OUTPATIENT
Start: 2025-07-25 | End: 2025-07-27 | Stop reason: HOSPADM

## 2025-07-25 RX ORDER — PROCHLORPERAZINE EDISYLATE 5 MG/ML
10 INJECTION INTRAMUSCULAR; INTRAVENOUS EVERY 6 HOURS PRN
Status: DISCONTINUED | OUTPATIENT
Start: 2025-07-25 | End: 2025-07-27 | Stop reason: HOSPADM

## 2025-07-25 RX ORDER — FENTANYL CITRATE 50 UG/ML
INJECTION, SOLUTION INTRAMUSCULAR; INTRAVENOUS AS NEEDED
Status: DISCONTINUED | OUTPATIENT
Start: 2025-07-25 | End: 2025-07-25

## 2025-07-25 RX ORDER — ACETAMINOPHEN 10 MG/ML
1000 INJECTION, SOLUTION INTRAVENOUS ONCE
Status: COMPLETED | OUTPATIENT
Start: 2025-07-25 | End: 2025-07-25

## 2025-07-25 RX ORDER — WATER 1 ML/ML
INJECTION IRRIGATION AS NEEDED
Status: DISCONTINUED | OUTPATIENT
Start: 2025-07-25 | End: 2025-07-25 | Stop reason: HOSPADM

## 2025-07-25 RX ORDER — SODIUM CHLORIDE, SODIUM LACTATE, POTASSIUM CHLORIDE, CALCIUM CHLORIDE 600; 310; 30; 20 MG/100ML; MG/100ML; MG/100ML; MG/100ML
75 INJECTION, SOLUTION INTRAVENOUS CONTINUOUS
Status: ACTIVE | OUTPATIENT
Start: 2025-07-25 | End: 2025-07-26

## 2025-07-25 RX ADMIN — HYDROMORPHONE HYDROCHLORIDE 0.5 MG: 0.5 INJECTION, SOLUTION INTRAMUSCULAR; INTRAVENOUS; SUBCUTANEOUS at 14:00

## 2025-07-25 RX ADMIN — DROPERIDOL 1.25 MG: 2.5 INJECTION, SOLUTION INTRAMUSCULAR; INTRAVENOUS at 11:00

## 2025-07-25 RX ADMIN — Medication 200 MCG: at 11:07

## 2025-07-25 RX ADMIN — METHOCARBAMOL 1000 MG: 100 INJECTION INTRAMUSCULAR; INTRAVENOUS at 14:29

## 2025-07-25 RX ADMIN — METHOCARBAMOL 500 MG: 500 TABLET ORAL at 21:00

## 2025-07-25 RX ADMIN — ENOXAPARIN SODIUM 40 MG: 100 INJECTION SUBCUTANEOUS at 20:52

## 2025-07-25 RX ADMIN — Medication 3 DOSE: at 13:45

## 2025-07-25 RX ADMIN — HYDROMORPHONE HYDROCHLORIDE 0.2 MG: 1 INJECTION, SOLUTION INTRAMUSCULAR; INTRAVENOUS; SUBCUTANEOUS at 13:00

## 2025-07-25 RX ADMIN — HYDROMORPHONE HYDROCHLORIDE 0.5 MG: 0.5 INJECTION, SOLUTION INTRAMUSCULAR; INTRAVENOUS; SUBCUTANEOUS at 13:42

## 2025-07-25 RX ADMIN — OXYCODONE HYDROCHLORIDE 5 MG: 5 TABLET ORAL at 21:02

## 2025-07-25 RX ADMIN — LIDOCAINE HYDROCHLORIDE 80 MG: 20 INJECTION, SOLUTION INFILTRATION; PERINEURAL at 10:34

## 2025-07-25 RX ADMIN — Medication 40 MG: at 10:34

## 2025-07-25 RX ADMIN — MIDAZOLAM HYDROCHLORIDE 2 MG: 2 INJECTION, SOLUTION INTRAMUSCULAR; INTRAVENOUS at 10:21

## 2025-07-25 RX ADMIN — HYDROMORPHONE HYDROCHLORIDE 0.4 MG: 1 INJECTION, SOLUTION INTRAMUSCULAR; INTRAVENOUS; SUBCUTANEOUS at 11:54

## 2025-07-25 RX ADMIN — SODIUM CHLORIDE, POTASSIUM CHLORIDE, SODIUM LACTATE AND CALCIUM CHLORIDE: 600; 310; 30; 20 INJECTION, SOLUTION INTRAVENOUS at 10:25

## 2025-07-25 RX ADMIN — ACETAMINOPHEN 650 MG: 325 TABLET ORAL at 20:52

## 2025-07-25 RX ADMIN — ROCURONIUM BROMIDE 20 MG: 10 INJECTION, SOLUTION INTRAVENOUS at 11:12

## 2025-07-25 RX ADMIN — ONDANSETRON 4 MG: 2 INJECTION, SOLUTION INTRAMUSCULAR; INTRAVENOUS at 12:56

## 2025-07-25 RX ADMIN — EPHEDRINE SULFATE 10 MG: 50 INJECTION INTRAVENOUS at 12:08

## 2025-07-25 RX ADMIN — MIDAZOLAM HYDROCHLORIDE 1 MG: 2 INJECTION, SOLUTION INTRAMUSCULAR; INTRAVENOUS at 13:16

## 2025-07-25 RX ADMIN — FENTANYL CITRATE 50 MCG: 50 INJECTION, SOLUTION INTRAMUSCULAR; INTRAVENOUS at 11:45

## 2025-07-25 RX ADMIN — PROPOFOL 250 MG: 10 INJECTION, EMULSION INTRAVENOUS at 10:34

## 2025-07-25 RX ADMIN — CEFAZOLIN 2 G: 1 INJECTION, POWDER, FOR SOLUTION INTRAMUSCULAR; INTRAVENOUS at 10:41

## 2025-07-25 RX ADMIN — Medication 10 MG: at 11:55

## 2025-07-25 RX ADMIN — ROCURONIUM BROMIDE 10 MG: 10 INJECTION, SOLUTION INTRAVENOUS at 12:10

## 2025-07-25 RX ADMIN — MIDAZOLAM HYDROCHLORIDE 1 MG: 2 INJECTION, SOLUTION INTRAMUSCULAR; INTRAVENOUS at 13:17

## 2025-07-25 RX ADMIN — HYDROMORPHONE HYDROCHLORIDE 0.2 MG: 1 INJECTION, SOLUTION INTRAMUSCULAR; INTRAVENOUS; SUBCUTANEOUS at 13:16

## 2025-07-25 RX ADMIN — ROCURONIUM BROMIDE 60 MG: 10 INJECTION, SOLUTION INTRAVENOUS at 10:34

## 2025-07-25 RX ADMIN — SUGAMMADEX 400 MG: 100 INJECTION, SOLUTION INTRAVENOUS at 13:06

## 2025-07-25 RX ADMIN — Medication 120 MCG: at 12:04

## 2025-07-25 RX ADMIN — HEPARIN SODIUM 5000 UNITS: 5000 INJECTION, SOLUTION INTRAVENOUS; SUBCUTANEOUS at 08:15

## 2025-07-25 RX ADMIN — ACETAMINOPHEN 1000 MG: 10 INJECTION INTRAVENOUS at 14:28

## 2025-07-25 RX ADMIN — ROCURONIUM BROMIDE 20 MG: 10 INJECTION, SOLUTION INTRAVENOUS at 12:29

## 2025-07-25 RX ADMIN — METHOCARBAMOL 500 MG: 500 TABLET ORAL at 16:51

## 2025-07-25 RX ADMIN — ROCURONIUM BROMIDE 20 MG: 10 INJECTION, SOLUTION INTRAVENOUS at 11:43

## 2025-07-25 RX ADMIN — HYDROMORPHONE HYDROCHLORIDE 0.2 MG: 1 INJECTION, SOLUTION INTRAMUSCULAR; INTRAVENOUS; SUBCUTANEOUS at 12:35

## 2025-07-25 RX ADMIN — FENTANYL CITRATE 50 MCG: 50 INJECTION, SOLUTION INTRAMUSCULAR; INTRAVENOUS at 10:34

## 2025-07-25 RX ADMIN — Medication 160 MCG: at 11:05

## 2025-07-25 SDOH — SOCIAL STABILITY: SOCIAL INSECURITY: DOES ANYONE TRY TO KEEP YOU FROM HAVING/CONTACTING OTHER FRIENDS OR DOING THINGS OUTSIDE YOUR HOME?: NO

## 2025-07-25 SDOH — SOCIAL STABILITY: SOCIAL INSECURITY: ARE YOU OR HAVE YOU BEEN THREATENED OR ABUSED PHYSICALLY, EMOTIONALLY, OR SEXUALLY BY ANYONE?: NO

## 2025-07-25 SDOH — SOCIAL STABILITY: SOCIAL INSECURITY: HAVE YOU HAD THOUGHTS OF HARMING ANYONE ELSE?: NO

## 2025-07-25 SDOH — SOCIAL STABILITY: SOCIAL INSECURITY: WITHIN THE LAST YEAR, HAVE YOU BEEN HUMILIATED OR EMOTIONALLY ABUSED IN OTHER WAYS BY YOUR PARTNER OR EX-PARTNER?: NO

## 2025-07-25 SDOH — SOCIAL STABILITY: SOCIAL INSECURITY: ABUSE: ADULT

## 2025-07-25 SDOH — SOCIAL STABILITY: SOCIAL INSECURITY: HAS ANYONE EVER THREATENED TO HURT YOUR FAMILY OR YOUR PETS?: NO

## 2025-07-25 SDOH — SOCIAL STABILITY: SOCIAL INSECURITY: WITHIN THE LAST YEAR, HAVE YOU BEEN AFRAID OF YOUR PARTNER OR EX-PARTNER?: NO

## 2025-07-25 SDOH — ECONOMIC STABILITY: FOOD INSECURITY: WITHIN THE PAST 12 MONTHS, THE FOOD YOU BOUGHT JUST DIDN'T LAST AND YOU DIDN'T HAVE MONEY TO GET MORE.: PATIENT DECLINED

## 2025-07-25 SDOH — HEALTH STABILITY: MENTAL HEALTH: CURRENT SMOKER: 0

## 2025-07-25 SDOH — ECONOMIC STABILITY: INCOME INSECURITY: IN THE PAST 12 MONTHS HAS THE ELECTRIC, GAS, OIL, OR WATER COMPANY THREATENED TO SHUT OFF SERVICES IN YOUR HOME?: NO

## 2025-07-25 SDOH — SOCIAL STABILITY: SOCIAL INSECURITY
WITHIN THE LAST YEAR, HAVE YOU BEEN KICKED, HIT, SLAPPED, OR OTHERWISE PHYSICALLY HURT BY YOUR PARTNER OR EX-PARTNER?: NO

## 2025-07-25 SDOH — ECONOMIC STABILITY: FOOD INSECURITY
WITHIN THE PAST 12 MONTHS, YOU WORRIED THAT YOUR FOOD WOULD RUN OUT BEFORE YOU GOT THE MONEY TO BUY MORE.: PATIENT DECLINED

## 2025-07-25 SDOH — SOCIAL STABILITY: SOCIAL INSECURITY: ARE THERE ANY APPARENT SIGNS OF INJURIES/BEHAVIORS THAT COULD BE RELATED TO ABUSE/NEGLECT?: NO

## 2025-07-25 SDOH — SOCIAL STABILITY: SOCIAL INSECURITY
WITHIN THE LAST YEAR, HAVE YOU BEEN RAPED OR FORCED TO HAVE ANY KIND OF SEXUAL ACTIVITY BY YOUR PARTNER OR EX-PARTNER?: NO

## 2025-07-25 SDOH — SOCIAL STABILITY: SOCIAL INSECURITY: WERE YOU ABLE TO COMPLETE ALL THE BEHAVIORAL HEALTH SCREENINGS?: YES

## 2025-07-25 SDOH — SOCIAL STABILITY: SOCIAL INSECURITY: DO YOU FEEL UNSAFE GOING BACK TO THE PLACE WHERE YOU ARE LIVING?: NO

## 2025-07-25 SDOH — SOCIAL STABILITY: SOCIAL INSECURITY: DO YOU FEEL ANYONE HAS EXPLOITED OR TAKEN ADVANTAGE OF YOU FINANCIALLY OR OF YOUR PERSONAL PROPERTY?: NO

## 2025-07-25 ASSESSMENT — PAIN SCALES - GENERAL
PAINLEVEL_OUTOF10: 7
PAINLEVEL_OUTOF10: 6
PAIN_LEVEL: 1
PAINLEVEL_OUTOF10: 5 - MODERATE PAIN
PAINLEVEL_OUTOF10: 7
PAINLEVEL_OUTOF10: 4
PAINLEVEL_OUTOF10: 6
PAINLEVEL_OUTOF10: 10 - WORST POSSIBLE PAIN
PAINLEVEL_OUTOF10: 5 - MODERATE PAIN

## 2025-07-25 ASSESSMENT — PAIN - FUNCTIONAL ASSESSMENT
PAIN_FUNCTIONAL_ASSESSMENT: 0-10
PAIN_FUNCTIONAL_ASSESSMENT: WONG-BAKER FACES
PAIN_FUNCTIONAL_ASSESSMENT: 0-10
PAIN_FUNCTIONAL_ASSESSMENT: 0-10
PAIN_FUNCTIONAL_ASSESSMENT: UNABLE TO SELF-REPORT
PAIN_FUNCTIONAL_ASSESSMENT: UNABLE TO SELF-REPORT
PAIN_FUNCTIONAL_ASSESSMENT: 0-10
PAIN_FUNCTIONAL_ASSESSMENT: 0-10
PAIN_FUNCTIONAL_ASSESSMENT: UNABLE TO SELF-REPORT
PAIN_FUNCTIONAL_ASSESSMENT: 0-10

## 2025-07-25 ASSESSMENT — ACTIVITIES OF DAILY LIVING (ADL)
ADEQUATE_TO_COMPLETE_ADL: YES
TOILETING: INDEPENDENT
HEARING - LEFT EAR: FUNCTIONAL
PATIENT'S MEMORY ADEQUATE TO SAFELY COMPLETE DAILY ACTIVITIES?: YES
HEARING - RIGHT EAR: FUNCTIONAL
FEEDING YOURSELF: INDEPENDENT
JUDGMENT_ADEQUATE_SAFELY_COMPLETE_DAILY_ACTIVITIES: YES
DRESSING YOURSELF: INDEPENDENT
LACK_OF_TRANSPORTATION: PATIENT DECLINED
GROOMING: INDEPENDENT
BATHING: INDEPENDENT
WALKS IN HOME: INDEPENDENT

## 2025-07-25 ASSESSMENT — LIFESTYLE VARIABLES
HOW OFTEN DO YOU HAVE A DRINK CONTAINING ALCOHOL: NEVER
AUDIT-C TOTAL SCORE: 0
HOW MANY STANDARD DRINKS CONTAINING ALCOHOL DO YOU HAVE ON A TYPICAL DAY: PATIENT DOES NOT DRINK
AUDIT-C TOTAL SCORE: 0
SKIP TO QUESTIONS 9-10: 1
HOW OFTEN DO YOU HAVE 6 OR MORE DRINKS ON ONE OCCASION: NEVER

## 2025-07-25 ASSESSMENT — COGNITIVE AND FUNCTIONAL STATUS - GENERAL
STANDING UP FROM CHAIR USING ARMS: A LOT
DRESSING REGULAR UPPER BODY CLOTHING: A LOT
MOVING TO AND FROM BED TO CHAIR: A LOT
TURNING FROM BACK TO SIDE WHILE IN FLAT BAD: A LOT
DAILY ACTIVITIY SCORE: 24
DAILY ACTIVITIY SCORE: 15
PERSONAL GROOMING: A LITTLE
HELP NEEDED FOR BATHING: A LOT
MOBILITY SCORE: 24
DRESSING REGULAR LOWER BODY CLOTHING: A LOT
CLIMB 3 TO 5 STEPS WITH RAILING: A LOT
TOILETING: A LOT
MOVING FROM LYING ON BACK TO SITTING ON SIDE OF FLAT BED WITH BEDRAILS: A LITTLE
MOBILITY SCORE: 13
WALKING IN HOSPITAL ROOM: A LOT
PATIENT BASELINE BEDBOUND: NO

## 2025-07-25 ASSESSMENT — COLUMBIA-SUICIDE SEVERITY RATING SCALE - C-SSRS
2. HAVE YOU ACTUALLY HAD ANY THOUGHTS OF KILLING YOURSELF?: NO
6. HAVE YOU EVER DONE ANYTHING, STARTED TO DO ANYTHING, OR PREPARED TO DO ANYTHING TO END YOUR LIFE?: NO
1. IN THE PAST MONTH, HAVE YOU WISHED YOU WERE DEAD OR WISHED YOU COULD GO TO SLEEP AND NOT WAKE UP?: NO

## 2025-07-25 ASSESSMENT — PATIENT HEALTH QUESTIONNAIRE - PHQ9
2. FEELING DOWN, DEPRESSED OR HOPELESS: NOT AT ALL
SUM OF ALL RESPONSES TO PHQ9 QUESTIONS 1 & 2: 0
1. LITTLE INTEREST OR PLEASURE IN DOING THINGS: NOT AT ALL

## 2025-07-25 ASSESSMENT — PAIN SCALES - WONG BAKER: WONGBAKER_NUMERICALRESPONSE: HURTS LITTLE MORE

## 2025-07-25 ASSESSMENT — PAIN DESCRIPTION - DESCRIPTORS: DESCRIPTORS: ACHING

## 2025-07-25 NOTE — ANESTHESIA POSTPROCEDURE EVALUATION
Patient: Mark Dao    Procedure Summary       Date: 07/25/25 Room / Location: [facility] OR 01 / Virtual [facility] OR    Anesthesia Start: 1026 Anesthesia Stop: 1319    Procedure: FUNDOPLICATION, ESOPHAGOGASTRIC, LAPAROSCOPIC (Abdomen) Diagnosis:       Chronic gastroesophageal reflux disease      (Chronic gastroesophageal reflux disease [K21.9])    Surgeons: Mark Tolentino MD Responsible Provider: Ayush Joseph MD    Anesthesia Type: general ASA Status: 3            Anesthesia Type: general    Vitals Value Taken Time   /81 07/25/25 13:22   Temp 36.7 07/25/25 13:26   Pulse 103 07/25/25 13:22   Resp 16 07/25/25 13:26   SpO2 92 % 07/25/25 13:22   Vitals shown include unfiled device data.    Anesthesia Post Evaluation    Patient location during evaluation: PACU  Patient participation: complete - patient participated  Level of consciousness: awake  Pain score: 1  Pain management: adequate  Airway patency: patent  Cardiovascular status: acceptable and blood pressure returned to baseline  Respiratory status: acceptable and face mask  Hydration status: acceptable  Postoperative Nausea and Vomiting: none        There were no known notable events for this encounter.

## 2025-07-25 NOTE — OP NOTE
LAPAROSCOPIC PARTIAL FUNDOPLICATION, ESOPHAGOGASTRIC WITH EGD Operative Note     Date: 2025  OR Location: [facility] OR    Name: Mark Dao, : 1985, Age: 40 y.o., MRN: [id number], Sex: male    Diagnosis  Pre-op Diagnosis      * Chronic gastroesophageal reflux disease [K21.9] Post-op Diagnosis     * Chronic gastroesophageal reflux disease [K21.9]     Procedures  LAPAROSCOPIC PARTIAL FUNDOPLICATION, ESOPHAGOGASTRIC WITH EGD  12938 - NC LAPS SURG ESOPG/GSTR FUNDOPLASTY      Surgeons      * Mark Tolentino - Primary    Resident/Fellow/Other Assistant:  Surgeons and Role:     * Raza Lee MD - Fellow    Staff:   Circulator: Abran Ledbetter Person: Zack Ortez Circulator: Harmony    Anesthesia Staff: Anesthesiologist: Ayush Joseph MD  C-AA: GREGORIA Lantigua  Anesthesia Resident: Olive Wilburn MD    Procedure Summary  Anesthesia: General  ASA: III  Estimated Blood Loss: <15mL  Intra-op Medications:   Administrations occurring from 0925 to 1230 on 25:   Medication Name Total Dose   sodium chloride 0.9 % irrigation solution 1,000 mL   surgical lubricant gel 1 Application   sterile water irrigation solution 1,000 mL   ceFAZolin (Ancef) vial 1 g 2 g   droperidol (Inapsine) injection 1.25 mg   ePHEDrine injection 10 mg   fentaNYL (Sublimaze) injection 50 mcg/mL 100 mcg   HYDROmorphone (Dilaudid) injection 1 mg/mL 0.4 mg   ketamine injection 50 mg/ 5 mL (10 mg/mL) 50 mg   LR infusion Cannot be calculated   lidocaine (Xylocaine) injection 2 % 80 mg   midazolam PF (Versed) injection 1 mg/mL 2 mg   phenylephrine 40 mcg/mL syringe 10 mL 480 mcg   propofol (Diprivan) injection 10 mg/mL 250 mg   rocuronium (ZeMuron) 50 mg/5 mL injection 130 mg              Anesthesia Record               Intraprocedure I/O Totals          Intake    LR infusion 1000.00 mL    Total Intake 1000 mL          Specimen: No specimens collected              Drains and/or Catheters: * None in log *    Tourniquet  Times:         Implants:     Findings: Adhesions between omentum and liver surface, no large  hiatal hernia.  hill grade 1 after toupet fundoplication    Indications: Mark Dao is an 40 y.o. male who is having surgery for Chronic gastroesophageal reflux disease [K21.9].     The patient was seen in the preoperative area. The risks, benefits, complications, treatment options, non-operative alternatives, expected recovery and outcomes were discussed with the patient. The possibilities of reaction to medication, pulmonary aspiration, injury to surrounding structures, bleeding, recurrent infection, the need for additional procedures, failure to diagnose a condition, and creating a complication requiring transfusion or operation were discussed with the patient. The patient concurred with the proposed plan, giving informed consent.  The site of surgery was properly noted/marked if necessary per policy. The patient has been actively warmed in preoperative area. Preoperative antibiotics have been ordered and given within 1 hours of incision. Venous thrombosis prophylaxis have been ordered including bilateral sequential compression devices and chemical prophylaxis    Procedure Details:     Patient was taken to the operating room and general anesthesia was induced.  After a surgical timeout, an EGD was performed with no evidence of neoplasm.  The scope was then withdrawn to the cervical esophagus.  The abdomen was then prepped and draped in the usual sterile fashion.  A longitudinal supraumbilical incision was made and the abdomen was entered under direct visualization using a 5mm optical trocar.  After successful entry into the abdomen the abdomen was insufflated and there were no apparent injuries to abdominal access. Additional 5mm trocars were placed in the right flank and right midclavicular line.  A 5 mm trocar was placed in the subxiphoid region and additional 11 mm trocar was placed in the left midclavicular  line. The laparoscopic liver retractor was then placed into the abdomen through the right flank port and the liver was retracted to expose the hiatus.  There was no hiatal hernia, we did see adhesions between the fat and the surface of the liver.  This were taken down with a energy device.  Dissection was started by dividing the peritoneum overlying the left Crura.  This dissection was carried towards the superior portion of the esophagus, on onto the right side.  The peritoneum overlying the right kylah was then divided.  The crural muscle fibers were then  from the hernia sac and the peritoneal incision was extended superiorly and inferiorly.  The dissection was then continued anteriorly over the esophagus and the peritoneal lining was again incised.  The left kylah was then identified and the peritoneum overlying the left kylah was then incised taking care to avoid injury to the esophagus and stomach.  The dissection was continued posteriorly on the left kylah.  Dissection was then performed posterior to the esophagus which then connected the right and left dissection planes.  A Penrose was then introduced into the abdomen and placed through this space to aid in applying appropriate tension. The dissection was continued superiorly into the mediastinum circumferentially around the esophagus freed from the surrounding pleura and mediastinum.  After this dissection approximately 3 cm of intra-abdominal esophagus was achieved without undue tension on the Penrose we were satisfied with the esophageal dissection.  We then proceeded to divide the short gastrics midway the body of the stomach all the way towards the GE junction.  Attention was then turned to crural closure.  Starting posteriorly, the crura was closed with interrupted 0 silk sutures, making sure to not impinge on the esophagus itself.  We then proceeded with the fundoplication portion of the case, the fundus of the stomach was grasped and placed  under the esophagus along the retroesophageal window, shoeshine maneuver was performed, the stomach laid here flat with no undue tension we then proceeded with the fundoplication portion by performing 3 interrupted 0 silk sutures along each side of the esophagus, carefully tacking the fundus of the stomach while protecting the anterior vagus nerve.  The Penrose drain was completely loosened and a repeat EGD was performed.  This showed no evidence of esophageal mucosal injury.  The esophagus advanced into the stomach without any undue resistance.  On retroflexion there was indeed a Hill grade 1 valve.  The stomach was fully suctioned and the endoscope was removed.  The left midclavicular port fascia was closed with an 0 PDS sutures using a laparoscopic suture passer. The liver retractor was then removed under direct visualization and all trocars were removed also under direct visualization.  The skin of all ports were then closed with 4-0 Vicryl suture and skin glue was applied as a dressing. Sterile Dermabond was applied on top.  The patient was awoken from anesthesia and taken the PACU in stable condition.  There were no immediate complications the patient tolerated the procedure well.  Dr. Raza Zaragoza served as the assistant for this procedure as was no qualified resident.  Dr. Zaragoza assisted with the dissection, mediastinal mobilization of the esophagus, and crural closure.     Evidence of Infection: No   Complications:  None; patient tolerated the procedure well.    Disposition: PACU - hemodynamically stable.  Condition: stable                 Discussed with Raza Lomeli MD (Vasquez)  General Surgeon  MIS Foregut / Flex Endo Fellow      Mark Tolentino  Phone Number: [phone]

## 2025-07-25 NOTE — SIGNIFICANT EVENT
Postoperative Check Note    Subjective  Mark Dao is a 40 y.o. male who is now POD0 s/p laparoscopic paraesophageal hernia repair with Toupet fundoplication. Postoperatively, patient reports left-sided pleuritic chest pain and shortness of breath. Satting 95% on 2L NC. Denies fevers, chills, nausea or vomiting.     Objective  Vitals:  Visit Vitals  /87 (BP Location: Right arm, Patient Position: Lying)   Pulse 90   Temp 36.7 °C (98.1 °F) (Temporal)   Resp 13       Physical Exam:  GEN: No acute distress. Alert, awake and conversant.  HEENT: Sclera anicteric. Moist mucous membranes.  RESP: Breathing non-labored, equal chest rise. On 2LNC.  CV: Regular rate, normotensive  GI: Abdomen soft, nondistended, appropriately tender for postoperative course. Port sites C/D/I with dermabond.    : Voiding spontaneously.  MSK: No gross deformities. Moves all extremities spontaneously.  NEURO: Alert and oriented x3. No focal deficits.  PSYCH: Appropriate mood and affect.  SKIN: No rashes or lesions.    Most recent labs:            16.4     10.8>-----<258              52.5     137  105  25                  ----------------<99     4.5  20  1.25            Mg No results found for requested labs within last 365 days.         Assessment  Mark Dao is a 40 y.o. male who is now POD0 s/p lap paraesophageal hernia repair with Toupet fundoplication.  Patient is in stable condition. The plan is as follows:    - Will continue to optimize pain management, current pain regimen: scheduled tylenol and robaxin, prn oxycodone   - Clear liquid diet   - LR @ 75mL/hr   - CXR ordered for SOB   - follow up esophagram in AM     Kay Jiménez MD  PGY-1 General Surgery  [facility] [id number]

## 2025-07-25 NOTE — ANESTHESIA PROCEDURE NOTES
Peripheral IV  Date/Time: 7/25/2025 10:49 AM      Placement  Needle size: 20 G  Laterality: right  Site prep: alcohol

## 2025-07-25 NOTE — ANESTHESIA PROCEDURE NOTES
Airway  Date/Time: 7/25/2025 10:39 AM  Reason: elective    Airway not difficult    Staffing  Performed: resident   Authorized by: Ayush Joseph MD    Performed by: Olive Wilburn MD  Patient location during procedure: OR    Patient Condition  Indications for airway management: anesthesia  Patient position: sniffing  MILS not maintained throughout  Planned trial extubation  Sedation level: deep     Final Airway Details   Preoxygenated: yes  Final airway type: endotracheal airway  Successful airway: ETT  Cuffed: yes   Successful intubation technique: video laryngoscopy  Adjuncts used in placement: intubating stylet  Endotracheal tube insertion site: oral  Blade size: #3  ETT size (mm): 7.5  Cormack-Lehane Classification: grade I - full view of glottis  Placement verified by: chest auscultation and capnometry   Measured from: lips  ETT to lips (cm): 21  Number of attempts at approach: 1

## 2025-07-25 NOTE — PROGRESS NOTES
"Pharmacy Medication History Review    Mark Dao is a 40 y.o. male admitted for Paraesophageal hernia. Pharmacy reviewed the patient's [id number] medications and allergies for accuracy.    Medications ADDED:  None  Medications CHANGED:  None  Medications REMOVED:   None     The list below reflects the updated PTA list.   Prior to Admission Medications   Prescriptions Last Dose Informant   pantoprazole (ProtoNix) 40 mg EC tablet 7/24/2025 Self   Sig: Take 1 tablet (40 mg) by mouth once daily in the morning. Take before meals. Take 30-60 minutes prior to breakfast. Do not crush, chew, or split.      Facility-Administered Medications: None        The list below reflects the updated allergy list. Please review each documented allergy for additional clarification and justification.  Allergies  Reviewed by Vee Tarango RN on 7/25/2025   No Known Allergies         Patient declines M2B at discharge.     Sources:   [facility]  Pharmacy dispense history  Patient Interview Good historian  Chart Review  Care Everywhere    Additional Comments:  None      Ger Drew, PharmD  Transitions of Care Pharmacist  07/25/25     Secure Chat preferred   If no response call [phone] or [facility] \"Med Rec\"    "

## 2025-07-25 NOTE — ANESTHESIA PREPROCEDURE EVALUATION
Patient: Mark Dao    Procedure Information       Date/Time: 07/25/25 0925    Procedure: FUNDOPLICATION, ESOPHAGOGASTRIC, LAPAROSCOPIC (Abdomen)    Location: [facility]  / [facility] OR    Surgeons: Mark Tolentino MD            Relevant Problems   ID   (+) Other chronic osteomyelitis, left tibia and fibula       Clinical information reviewed:   Tobacco  Allergies  Meds   Med Hx  Surg Hx   Fam Hx  Soc Hx        NPO Detail:  NPO/Void Status  Date of Last Liquid: 07/25/25  Time of Last Liquid: 0000  Date of Last Solid: 07/25/25  Time of Last Solid: 0000         Physical Exam    Airway  Mallampati: II  TM distance: >3 FB  Neck ROM: full  Mouth opening: 3 or more finger widths     Cardiovascular - normal exam   Dental - normal exam     Pulmonary - normal exam   Abdominal            Anesthesia Plan    History of general anesthesia?: yes  History of complications of general anesthesia?: no    ASA 3     general     The patient is not a current smoker.    intravenous induction   Postoperative pain plan includes opioids.  Trial extubation is planned.  Anesthetic plan and risks discussed with patient.  Use of blood products discussed with patient who consented to blood products.    Plan discussed with attending.

## 2025-07-25 NOTE — H&P
History Of Present Illness  40-year-old male with longstanding history of gastroesophageal reflux disease who underwent upper endoscopy and Bravo capsule placement off medications. His Bravo capsule showed a DeMeester of 41 with a 11.7% acid exposure time. Both of these are consistent with significant pathologic exposure of acid in his esophagus.     Here today for an elective Lap Toupet Fundoplication.   Past Medical History  Medical History[1]    Surgical History  Surgical History[2]     Social History  He reports that he has been smoking pipe, cigarettes, and cigars. He has a 10 pack-year smoking history. He quit smokeless tobacco use about 5 years ago.  His smokeless tobacco use included snuff and chew. He reports that he does not currently use alcohol. He reports that he does not currently use drugs after having used the following drugs: Methamphetamines.    Family History  Family History[3]     Allergies  Patient has no known allergies.    Review of Systems   All other systems reviewed and are negative.       Physical Exam  Vitals reviewed.   HENT:      Head: Normocephalic.     Cardiovascular:      Rate and Rhythm: Normal rate.   Pulmonary:      Effort: Pulmonary effort is normal.   Abdominal:      General: Abdomen is flat.     Musculoskeletal:         General: Normal range of motion.      Cervical back: Normal range of motion.     Skin:     General: Skin is warm.     Neurological:      General: No focal deficit present.      Mental Status: He is alert and oriented to person, place, and time.          Last Recorded Vitals  Blood pressure 158/83, pulse 93, temperature 36.3 °C (97.3 °F), temperature source Temporal, resp. rate 18, weight 119 kg (262 lb 2 oz), SpO2 97%.    Relevant Results           Assessment & Plan      40-year-old male with longstanding history of gastroesophageal reflux disease Here today for an elective Lap Toupet Fundoplication.     Patient's History and Physical was reviewed and  updated.     Plan:    - The procedure was explained once again. Questions were sought and answered.   - The risks and benefit of the procedure were discussed. The patient is willing proceed.   - Consent was reviewed and signed.   - Further recommendations after the procedure.     Discussed with Dr. 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, sequela     Joint pain     Neuromuscular disorder (Multi)     Sepsis 07/19/2023   [2]   Past Surgical History:  Procedure Laterality Date    CT ANGIO AORTA AND BILATERAL ILIOFEMORAL RUN OFF INCLUDING WITHOUT CONTRAST IF PERFORMED  07/19/2023    CT AORTA AND BILATERAL ILIOFEMORAL RUNOFF ANGIOGRAM W AND/OR WO IV CONTRAST 7/19/2023 GEN CT    ESOPHAGOGASTRODUODENOSCOPY  03/12/2025    EYE SURGERY      ORIF TIBIA FRACTURE      OTHER SURGICAL HISTORY      TIBIA & FIBIA Left 06/25/2024    Excision Bone Tibia/Fibula    TIBIA & FIBIA  2021    fracture surgery    UPPER GASTROINTESTINAL ENDOSCOPY     [3]   Family History  Problem Relation Name Age of Onset    Diabetes Mother      No Known Problems Father

## 2025-07-26 ENCOUNTER — APPOINTMENT (OUTPATIENT)
Dept: RADIOLOGY | Facility: HOSPITAL | Age: 40
End: 2025-07-26
Payer: MEDICAID

## 2025-07-26 ENCOUNTER — APPOINTMENT (OUTPATIENT)
Dept: CARDIOLOGY | Facility: HOSPITAL | Age: 40
End: 2025-07-26
Payer: MEDICAID

## 2025-07-26 VITALS
OXYGEN SATURATION: 93 % | BODY MASS INDEX: 31.64 KG/M2 | HEART RATE: 104 BPM | DIASTOLIC BLOOD PRESSURE: 94 MMHG | WEIGHT: 259.92 LBS | TEMPERATURE: 98.2 F | SYSTOLIC BLOOD PRESSURE: 134 MMHG | RESPIRATION RATE: 16 BRPM

## 2025-07-26 LAB
ALBUMIN SERPL BCP-MCNC: 4.5 G/DL (ref 3.4–5)
ALP SERPL-CCNC: 58 U/L (ref 33–120)
ALT SERPL W P-5'-P-CCNC: 30 U/L (ref 10–52)
ANION GAP BLDA CALCULATED.4IONS-SCNC: 11 MMO/L (ref 10–25)
ANION GAP SERPL CALC-SCNC: 15 MMOL/L (ref 10–20)
AST SERPL W P-5'-P-CCNC: 18 U/L (ref 9–39)
BASE EXCESS BLDA CALC-SCNC: 1.6 MMOL/L (ref -2–3)
BILIRUB SERPL-MCNC: 1.3 MG/DL (ref 0–1.2)
BODY TEMPERATURE: 37 DEGREES CELSIUS
BUN SERPL-MCNC: 18 MG/DL (ref 6–23)
CA-I BLDA-SCNC: 1.11 MMOL/L (ref 1.1–1.33)
CALCIUM SERPL-MCNC: 9.1 MG/DL (ref 8.6–10.6)
CHLORIDE BLDA-SCNC: 108 MMOL/L (ref 98–107)
CHLORIDE SERPL-SCNC: 105 MMOL/L (ref 98–107)
CO2 SERPL-SCNC: 21 MMOL/L (ref 21–32)
CREAT SERPL-MCNC: 1.14 MG/DL (ref 0.5–1.3)
EGFRCR SERPLBLD CKD-EPI 2021: 84 ML/MIN/1.73M*2
ERYTHROCYTE [DISTWIDTH] IN BLOOD BY AUTOMATED COUNT: 14.9 % (ref 11.5–14.5)
GLUCOSE BLDA-MCNC: 117 MG/DL (ref 74–99)
GLUCOSE SERPL-MCNC: 102 MG/DL (ref 74–99)
HCO3 BLDA-SCNC: 20.6 MMOL/L (ref 22–26)
HCT VFR BLD AUTO: 47.4 % (ref 41–52)
HCT VFR BLD EST: 50 % (ref 41–52)
HGB BLD-MCNC: 15.4 G/DL (ref 13.5–17.5)
HGB BLDA-MCNC: 16.5 G/DL (ref 13.5–17.5)
INHALED O2 CONCENTRATION: 21 %
LACTATE BLDA-SCNC: 1.7 MMOL/L (ref 0.4–2)
MCH RBC QN AUTO: 26.1 PG (ref 26–34)
MCHC RBC AUTO-ENTMCNC: 32.5 G/DL (ref 32–36)
MCV RBC AUTO: 81 FL (ref 80–100)
NRBC BLD-RTO: 0 /100 WBCS (ref 0–0)
OXYHGB MFR BLDA: 95.5 % (ref 94–98)
PCO2 BLDA: 21 MM HG (ref 38–42)
PH BLDA: 7.6 PH (ref 7.38–7.42)
PLATELET # BLD AUTO: 250 X10*3/UL (ref 150–450)
PO2 BLDA: 72 MM HG (ref 85–95)
POTASSIUM BLDA-SCNC: 3.6 MMOL/L (ref 3.5–5.3)
POTASSIUM SERPL-SCNC: 3.6 MMOL/L (ref 3.5–5.3)
PROT SERPL-MCNC: 7.4 G/DL (ref 6.4–8.2)
RBC # BLD AUTO: 5.89 X10*6/UL (ref 4.5–5.9)
SAO2 % BLDA: 98 % (ref 94–100)
SODIUM BLDA-SCNC: 136 MMOL/L (ref 136–145)
SODIUM SERPL-SCNC: 137 MMOL/L (ref 136–145)
WBC # BLD AUTO: 12.6 X10*3/UL (ref 4.4–11.3)

## 2025-07-26 PROCEDURE — 36415 COLL VENOUS BLD VENIPUNCTURE: CPT

## 2025-07-26 PROCEDURE — 71045 X-RAY EXAM CHEST 1 VIEW: CPT | Performed by: RADIOLOGY

## 2025-07-26 PROCEDURE — 93010 ELECTROCARDIOGRAM REPORT: CPT | Performed by: INTERNAL MEDICINE

## 2025-07-26 PROCEDURE — 7100000011 HC EXTENDED STAY RECOVERY HOURLY - NURSING UNIT

## 2025-07-26 PROCEDURE — 71045 X-RAY EXAM CHEST 1 VIEW: CPT

## 2025-07-26 PROCEDURE — 93005 ELECTROCARDIOGRAM TRACING: CPT

## 2025-07-26 PROCEDURE — 84132 ASSAY OF SERUM POTASSIUM: CPT | Mod: 59

## 2025-07-26 PROCEDURE — 71275 CT ANGIOGRAPHY CHEST: CPT

## 2025-07-26 PROCEDURE — 2550000001 HC RX 255 CONTRASTS: Mod: SE | Performed by: SURGERY

## 2025-07-26 PROCEDURE — 2500000001 HC RX 250 WO HCPCS SELF ADMINISTERED DRUGS (ALT 637 FOR MEDICARE OP): Mod: SE | Performed by: STUDENT IN AN ORGANIZED HEALTH CARE EDUCATION/TRAINING PROGRAM

## 2025-07-26 PROCEDURE — 84132 ASSAY OF SERUM POTASSIUM: CPT

## 2025-07-26 PROCEDURE — 74220 X-RAY XM ESOPHAGUS 1CNTRST: CPT

## 2025-07-26 PROCEDURE — 71275 CT ANGIOGRAPHY CHEST: CPT | Performed by: RADIOLOGY

## 2025-07-26 PROCEDURE — 2500000004 HC RX 250 GENERAL PHARMACY W/ HCPCS (ALT 636 FOR OP/ED): Mod: SE

## 2025-07-26 PROCEDURE — 2500000004 HC RX 250 GENERAL PHARMACY W/ HCPCS (ALT 636 FOR OP/ED): Mod: SE | Performed by: STUDENT IN AN ORGANIZED HEALTH CARE EDUCATION/TRAINING PROGRAM

## 2025-07-26 PROCEDURE — 85027 COMPLETE CBC AUTOMATED: CPT

## 2025-07-26 PROCEDURE — 74220 X-RAY XM ESOPHAGUS 1CNTRST: CPT | Performed by: RADIOLOGY

## 2025-07-26 RX ORDER — LIDOCAINE 560 MG/1
2 PATCH PERCUTANEOUS; TOPICAL; TRANSDERMAL DAILY
Qty: 10 PATCH | Refills: 0 | Status: SHIPPED | OUTPATIENT
Start: 2025-07-26 | End: 2025-08-02

## 2025-07-26 RX ORDER — SODIUM CHLORIDE, SODIUM LACTATE, POTASSIUM CHLORIDE, CALCIUM CHLORIDE 600; 310; 30; 20 MG/100ML; MG/100ML; MG/100ML; MG/100ML
100 INJECTION, SOLUTION INTRAVENOUS CONTINUOUS
Status: DISCONTINUED | OUTPATIENT
Start: 2025-07-26 | End: 2025-07-27 | Stop reason: HOSPADM

## 2025-07-26 RX ORDER — LIDOCAINE 560 MG/1
2 PATCH PERCUTANEOUS; TOPICAL; TRANSDERMAL DAILY
Status: DISCONTINUED | OUTPATIENT
Start: 2025-07-26 | End: 2025-07-27 | Stop reason: HOSPADM

## 2025-07-26 RX ORDER — OXYCODONE HYDROCHLORIDE 5 MG/1
5 TABLET ORAL EVERY 6 HOURS PRN
Qty: 10 TABLET | Refills: 0 | Status: SHIPPED | OUTPATIENT
Start: 2025-07-26

## 2025-07-26 RX ORDER — OXYCODONE HCL 5 MG/5 ML
5 SOLUTION, ORAL ORAL EVERY 4 HOURS PRN
Refills: 0 | Status: DISCONTINUED | OUTPATIENT
Start: 2025-07-26 | End: 2025-07-27 | Stop reason: HOSPADM

## 2025-07-26 RX ORDER — SUCRALFATE 1 G/10ML
1 SUSPENSION ORAL 4 TIMES DAILY
Qty: 1200 ML | Refills: 0 | Status: SHIPPED | OUTPATIENT
Start: 2025-07-26 | End: 2025-08-25

## 2025-07-26 RX ORDER — HYDROXYZINE HYDROCHLORIDE 25 MG/1
25 TABLET, FILM COATED ORAL ONCE
Status: DISCONTINUED | OUTPATIENT
Start: 2025-07-26 | End: 2025-07-26

## 2025-07-26 RX ORDER — DIATRIZOATE MEGLUMINE AND DIATRIZOATE SODIUM 660; 100 MG/ML; MG/ML
100 SOLUTION ORAL; RECTAL ONCE
Status: COMPLETED | OUTPATIENT
Start: 2025-07-26 | End: 2025-07-26

## 2025-07-26 RX ORDER — OXYCODONE HCL 5 MG/5 ML
10 SOLUTION, ORAL ORAL EVERY 4 HOURS PRN
Refills: 0 | Status: DISCONTINUED | OUTPATIENT
Start: 2025-07-26 | End: 2025-07-27 | Stop reason: HOSPADM

## 2025-07-26 RX ADMIN — SODIUM CHLORIDE, SODIUM LACTATE, POTASSIUM CHLORIDE, AND CALCIUM CHLORIDE 500 ML: .6; .31; .03; .02 INJECTION, SOLUTION INTRAVENOUS at 19:25

## 2025-07-26 RX ADMIN — SODIUM CHLORIDE, SODIUM LACTATE, POTASSIUM CHLORIDE, AND CALCIUM CHLORIDE 500 ML: .6; .31; .03; .02 INJECTION, SOLUTION INTRAVENOUS at 20:58

## 2025-07-26 RX ADMIN — METHOCARBAMOL 500 MG: 500 TABLET ORAL at 13:36

## 2025-07-26 RX ADMIN — SODIUM CHLORIDE, SODIUM LACTATE, POTASSIUM CHLORIDE, AND CALCIUM CHLORIDE 75 ML/HR: .6; .31; .03; .02 INJECTION, SOLUTION INTRAVENOUS at 10:46

## 2025-07-26 RX ADMIN — PANTOPRAZOLE SODIUM 40 MG: 40 TABLET, DELAYED RELEASE ORAL at 06:10

## 2025-07-26 RX ADMIN — METHOCARBAMOL 500 MG: 500 TABLET ORAL at 06:10

## 2025-07-26 RX ADMIN — DIATRIZOATE MEGLUMINE AND DIATRIZOATE SODIUM 100 ML: 660; 100 LIQUID ORAL; RECTAL at 12:54

## 2025-07-26 RX ADMIN — IOHEXOL 75 ML: 350 INJECTION, SOLUTION INTRAVENOUS at 20:53

## 2025-07-26 ASSESSMENT — PAIN SCALES - GENERAL: PAINLEVEL_OUTOF10: 0 - NO PAIN

## 2025-07-26 ASSESSMENT — ACTIVITIES OF DAILY LIVING (ADL): LACK_OF_TRANSPORTATION: NO

## 2025-07-26 ASSESSMENT — PAIN SCALES - WONG BAKER: WONGBAKER_NUMERICALRESPONSE: NO HURT

## 2025-07-26 NOTE — DISCHARGE SUMMARY
Discharge Diagnosis  Paraesophageal hernia  GERD    Issues Requiring Follow-Up  S/p laparoscopic tupet funoplication 7/25 - needs postop visit    Test Results Pending At Discharge  Pending Labs       No current pending labs.            Hospital Course  40 year-old male with a history of longstanding GERD who underwent upper endoscopy and BRAVO that showed evidence of reflux. DemMeester score 41. Underwent elective laparoscopic Toupet fundoplication with Dr. Tolentino on 7/25. Reported left chest discomfort postop, CXR negative for acute process. Esophagogram on POD 1 did not show any evidence of leak or hiatal hernia. Postop pain controlled. Diet advaced to full liquid diet, which patient tolerated well. Upon discharge, pain was controlled, tolerating full liquid diet, ambulating, and voiding without difficulty. He was discharged home with outpatient surgical follow up scheduled.        Visit Vitals  /79 (BP Location: Right arm, Patient Position: Lying)   Pulse 82   Temp 36.8 °C (98.2 °F) (Temporal)   Resp 17     Vitals:    07/26/25 0750   Weight: 118 kg (259 lb 14.8 oz)       Immunization History   Administered Date(s) Administered    MMR vaccine, subcutaneous (MMR II) 05/08/1998       Results  Esophagram review - no leak or hiatal hernia, contrast passes appropriately    Pertinent Physical Exam At Time of Discharge  Physical Exam  GEN: No acute distress. Alert, awake and conversant.  HEENT: Sclera anicteric. Moist mucous membranes.  RESP: Breathing non-labored, equal chest rise. On room air.  CV: Regular rate, normotensive  GI: Abdomen soft, nondistended, appropriately tender for postoperative course. Port sites C/D/I with dermabond.    : Voiding spontaneously.  MSK: No gross deformities. Moves all extremities spontaneously.  NEURO: Alert and oriented x3. No focal deficits.  PSYCH: Appropriate mood and affect.  SKIN: No rashes or lesions.    Home Medications     Medication List      START taking these  medications     lidocaine 4 % patch; Place 2 patches over 12 hours on the skin once   daily for 7 days. Remove & discard patch within 12 hours or as directed by   MD.   oxyCODONE 5 mg immediate release tablet; Commonly known as: Roxicodone;   Take 1 tablet (5 mg) by mouth every 6 hours if needed for severe pain (7 -   10).   sucralfate 100 mg/mL suspension; Commonly known as: Carafate; Take 10 mL   (1 g) by mouth 4 times a day. Take 10 ML by mouth on an empty stomach four   times daily:  before meals and at bed time.     CONTINUE taking these medications     pantoprazole 40 mg EC tablet; Commonly known as: ProtoNix; Take 1 tablet   (40 mg) by mouth once daily in the morning. Take before meals. Take 30-60   minutes prior to breakfast. Do not crush, chew, or split.       Outpatient Follow-Up  Future Appointments   Date Time Provider Department Center   8/7/2025  2:30 PM Vincent Delatorre DO [id number]       Rica Drew MD

## 2025-07-26 NOTE — PROGRESS NOTES
07/26/25 1303   Discharge Planning   Living Arrangements Parent   Support Systems Parent;Friends/neighbors   Assistance Needed Pt independent with cane.   Type of Residence Private residence   Number of Stairs to Enter Residence 2   Number of Stairs Within Residence 0   Who is requesting discharge planning? Provider   Home or Post Acute Services   (TBD, pending PT/OT and hospital course.)   Expected Discharge Disposition Home  (TBD, pending PT/OT and hospital course.)   Financial Resource Strain   How hard is it for you to pay for the very basics like food, housing, medical care, and heating? Not hard   Housing Stability   In the last 12 months, was there a time when you were not able to pay the mortgage or rent on time? N   At any time in the past 12 months, were you homeless or living in a shelter (including now)? N   Transportation Needs   In the past 12 months, has lack of transportation kept you from medical appointments or from getting medications? no   In the past 12 months, has lack of transportation kept you from meetings, work, or from getting things needed for daily living? No     Met with pt and introduced myself as Care Coordinator with Care Transitions Team for Discharge Planning. Pt stated he feels safe at home. Pt drives himself to drs appclement.  Pt's address, phone number and contact information was verified. Pt denies any social or financial concerns at this time.  Home Healthcare:  none active  DME:  Pt owns a cane, wheelchair rollator.   PCP: Tyson De Guzman, last appt 7/17/25  Ph[facility]acy: [facility]    GRACIE Mayer, RN  Transitional Care Coordinator   Office: [phone]  Secure chat via [employer]

## 2025-07-26 NOTE — CARE PLAN
The patient's goals for the shift include      The clinical goals for the shift include patient will remain HDS and free of falls and injuries      Problem: Pain - Adult  Goal: Verbalizes/displays adequate comfort level or baseline comfort level  Outcome: Progressing     Problem: Safety - Adult  Goal: Free from fall injury  Outcome: Progressing     Problem: Discharge Planning  Goal: Discharge to home or other facility with appropriate resources  Outcome: Progressing     Problem: Chronic Conditions and Co-morbidities  Goal: Patient's chronic conditions and co-morbidity symptoms are monitored and maintained or improved  Outcome: Progressing     Problem: Nutrition  Goal: Nutrient intake appropriate for maintaining nutritional needs  Outcome: Progressing

## 2025-07-26 NOTE — HOSPITAL COURSE
40 year-old male with a history of longstanding GERD who underwent upper endoscopy and BRAVO that showed evidence of reflux. DemMeester score 41. Underwent elective laparoscopic Toupet fundoplication with Dr. Tolentino on 7/25. Reported left chest discomfort postop, CXR negative for acute process. Esophagogram on POD 1 did not show any evidence of leak or hiatal hernia. Postop pain controlled. Diet advaced to full liquid diet, which patient tolerated well. Upon discharge, pain was controlled, tolerating full liquid diet, ambulating, and voiding without difficulty. He was discharged home with outpatient surgical follow up scheduled.

## 2025-07-26 NOTE — DISCHARGE INSTRUCTIONS
DIET:  - Full liquid diet x1 week (anything you can pour from a glass- examples include pudding, creamy soups without chunks, applesauce, protein shakes, smoothies)  - Soft food diet x1 week (anything you can mash with a fork- examples include mashed potatoes, bananas, scrambled eggs) until your follow up appointment.    For pain control: Take 650mg Tylenol every 4-6 hours. You may rotate with ibuprofen as needed. You may take the muscle relaxer, robaxin, as needed for pain control.      Continue Protonix daily.        Follow up with Dr Tolentino in 2 weeks via telephone. Please call [phone] if appt not listed in [facility] in next 1-2 days.          patient

## 2025-07-27 NOTE — SIGNIFICANT EVENT
"S: Called to bedside for patient feeling shortness of breath, tachycardic to the 120s. Patient endorses feeling like his heart is racing, having decreased PO intake and has been refusing PRN pain medication because he \"needs the pain to limit him\" and \"doesn't want to overdo it\".     O:   Vitals:    07/26/25 1923   BP: (!) 134/94   Pulse: 104   Resp: 16   Temp: 36.8 °C (98.2 °F)   SpO2: 93%     Physical Exam:   Neurological: Awake, alert, conversive  Respiratory/Thorax: tachypneic, shallow breaths without use of accessory muscle use.   Gastrointestinal: soft, moderately distended, not TTP. Port sites all well approximated.   Skin: warm, dry  Musculoskeletal: MARIE  Extremities: no edema   Psychological: appropriate mood/affect    EKG Sinus tachycardia     A/P:   - ABG, CBC, CMP, EKG  - bolus fluids   - Consider KYLIEE     Laila العراقي MD  PGY-1 General Surgery  [facility] [id number]    "

## 2025-07-27 NOTE — SIGNIFICANT EVENT
Patient POD1 from paraesophageal hernia repair with Toupet fundoplication. Was tachycardic and tachypneic with workup initiated, see previous significant event notes.     Discussed risks, benefits, of leaving with incomplete workup with patient. Patient relayed back understanding. Due to social factors including ride limitations, patient determined that he would leave against medical advice and knows signs and symptoms to present to ED. AMA paperwork signed.     Seen and discussed with Dr. Ruiz.     Laila العراقي MD  PGY-1 General Surgery  [place] [id number]

## 2025-07-27 NOTE — SIGNIFICANT EVENT
Clinical Event Note    While getting up this evening, patient became short of breath and tachycardic. Vital signs revealed spO2 94-95% on room air, HR 110s. EKG obtained and showed sinus tachycardia.     Labs including CBC, ABG obtained and showed a mild leukocytosis with WBC 12.7 and a respiratory alkalosis. Lactate normal 1.7.     CT PE obtained. On initial review, no evidence of filling defects to suggest PE. There is a very small apical LEFT pneumothorax.     Will obtain AM CXR to assess PTX. Will place patient on continuous o2 monitoring overnight.     Patient to remain inpatient overnight for hemodynamic monitoring.     D/w Dr. Rajan Drew MD  General Surgery PGY-3  [facility] (Minimally Invasive/Bariatric Surgery/Surgical Endoscopy)  [id number]

## 2025-07-29 LAB
ATRIAL RATE: 105 BPM
ATRIAL RATE: 70 BPM
P AXIS: 34 DEGREES
P AXIS: 37 DEGREES
P OFFSET: 197 MS
P OFFSET: 204 MS
P ONSET: 147 MS
P ONSET: 151 MS
PR INTERVAL: 130 MS
PR INTERVAL: 146 MS
Q ONSET: 216 MS
Q ONSET: 220 MS
QRS COUNT: 12 BEATS
QRS COUNT: 17 BEATS
QRS DURATION: 84 MS
QRS DURATION: 84 MS
QT INTERVAL: 342 MS
QT INTERVAL: 372 MS
QTC CALCULATION(BAZETT): 401 MS
QTC CALCULATION(BAZETT): 452 MS
QTC FREDERICIA: 391 MS
QTC FREDERICIA: 412 MS
R AXIS: 18 DEGREES
R AXIS: 20 DEGREES
T AXIS: 23 DEGREES
T AXIS: 50 DEGREES
T OFFSET: 387 MS
T OFFSET: 406 MS
VENTRICULAR RATE: 105 BPM
VENTRICULAR RATE: 70 BPM

## 2025-08-07 ENCOUNTER — APPOINTMENT (OUTPATIENT)
Facility: CLINIC | Age: 40
End: 2025-08-07
Payer: MEDICAID

## 2025-08-11 ENCOUNTER — APPOINTMENT (OUTPATIENT)
Dept: SURGERY | Facility: CLINIC | Age: 40
End: 2025-08-11
Payer: MEDICAID

## 2025-08-11 DIAGNOSIS — K21.9 GASTROESOPHAGEAL REFLUX DISEASE WITH HIATAL HERNIA: Primary | ICD-10-CM

## 2025-08-11 DIAGNOSIS — K44.9 GASTROESOPHAGEAL REFLUX DISEASE WITH HIATAL HERNIA: Primary | ICD-10-CM

## 2025-08-11 PROCEDURE — 99024 POSTOP FOLLOW-UP VISIT: CPT | Performed by: SURGERY

## 2025-08-14 ENCOUNTER — APPOINTMENT (OUTPATIENT)
Facility: CLINIC | Age: 40
End: 2025-08-14
Payer: MEDICAID

## 2025-08-14 VITALS
HEIGHT: 76 IN | BODY MASS INDEX: 31.42 KG/M2 | DIASTOLIC BLOOD PRESSURE: 83 MMHG | TEMPERATURE: 97.9 F | HEART RATE: 92 BPM | RESPIRATION RATE: 17 BRPM | WEIGHT: 258 LBS | SYSTOLIC BLOOD PRESSURE: 119 MMHG | OXYGEN SATURATION: 97 %

## 2025-08-14 DIAGNOSIS — M43.06 LUMBAR SPONDYLOLYSIS: Primary | ICD-10-CM

## 2025-08-14 PROCEDURE — 3008F BODY MASS INDEX DOCD: CPT | Performed by: STUDENT IN AN ORGANIZED HEALTH CARE EDUCATION/TRAINING PROGRAM

## 2025-08-14 PROCEDURE — 99213 OFFICE O/P EST LOW 20 MIN: CPT | Performed by: STUDENT IN AN ORGANIZED HEALTH CARE EDUCATION/TRAINING PROGRAM

## 2025-08-14 RX ORDER — NALOXONE HYDROCHLORIDE 4 MG/.1ML
1 SPRAY NASAL AS NEEDED
Qty: 2 EACH | Refills: 0 | Status: SHIPPED | OUTPATIENT
Start: 2025-08-14

## 2025-08-14 RX ORDER — TIZANIDINE 2 MG/1
2 TABLET ORAL EVERY 8 HOURS PRN
Qty: 90 TABLET | Refills: 2 | Status: SHIPPED | OUTPATIENT
Start: 2025-08-14 | End: 2025-11-12

## 2025-08-14 RX ORDER — DIAZEPAM 5 MG/1
5 TABLET ORAL ONCE AS NEEDED
OUTPATIENT
Start: 2025-08-14

## 2025-08-14 ASSESSMENT — ENCOUNTER SYMPTOMS: BACK PAIN: 1

## 2025-08-14 ASSESSMENT — PATIENT HEALTH QUESTIONNAIRE - PHQ9
2. FEELING DOWN, DEPRESSED OR HOPELESS: NOT AT ALL
1. LITTLE INTEREST OR PLEASURE IN DOING THINGS: NOT AT ALL
SUM OF ALL RESPONSES TO PHQ9 QUESTIONS 1 AND 2: 0

## 2025-08-14 ASSESSMENT — PAIN SCALES - GENERAL: PAINLEVEL_OUTOF10: 5

## 2025-08-27 ENCOUNTER — APPOINTMENT (OUTPATIENT)
Dept: RADIOLOGY | Facility: HOSPITAL | Age: 40
End: 2025-08-27
Payer: MEDICAID

## 2025-08-29 ENCOUNTER — HOSPITAL ENCOUNTER (OUTPATIENT)
Dept: PAIN MEDICINE | Facility: HOSPITAL | Age: 40
Discharge: HOME | End: 2025-08-29
Payer: MEDICAID

## 2025-08-29 VITALS
DIASTOLIC BLOOD PRESSURE: 84 MMHG | HEIGHT: 76 IN | SYSTOLIC BLOOD PRESSURE: 115 MMHG | TEMPERATURE: 97.2 F | OXYGEN SATURATION: 100 % | HEART RATE: 64 BPM | RESPIRATION RATE: 16 BRPM | BODY MASS INDEX: 29.83 KG/M2 | WEIGHT: 245 LBS

## 2025-08-29 DIAGNOSIS — M43.06 LUMBAR SPONDYLOLYSIS: ICD-10-CM

## 2025-08-29 PROCEDURE — 7100000009 HC PHASE TWO TIME - INITIAL BASE CHARGE

## 2025-08-29 PROCEDURE — 2500000004 HC RX 250 GENERAL PHARMACY W/ HCPCS (ALT 636 FOR OP/ED): Performed by: STUDENT IN AN ORGANIZED HEALTH CARE EDUCATION/TRAINING PROGRAM

## 2025-08-29 PROCEDURE — 2550000001 HC RX 255 CONTRASTS: Performed by: STUDENT IN AN ORGANIZED HEALTH CARE EDUCATION/TRAINING PROGRAM

## 2025-08-29 PROCEDURE — 62323 NJX INTERLAMINAR LMBR/SAC: CPT | Performed by: STUDENT IN AN ORGANIZED HEALTH CARE EDUCATION/TRAINING PROGRAM

## 2025-08-29 PROCEDURE — 7100000010 HC PHASE TWO TIME - EACH INCREMENTAL 1 MINUTE

## 2025-08-29 RX ORDER — LIDOCAINE HYDROCHLORIDE 5 MG/ML
INJECTION, SOLUTION INFILTRATION; INTRAVENOUS AS NEEDED
Status: COMPLETED | OUTPATIENT
Start: 2025-08-29 | End: 2025-08-29

## 2025-08-29 RX ORDER — LIDOCAINE HYDROCHLORIDE 10 MG/ML
INJECTION, SOLUTION EPIDURAL; INFILTRATION; INTRACAUDAL; PERINEURAL AS NEEDED
Status: COMPLETED | OUTPATIENT
Start: 2025-08-29 | End: 2025-08-29

## 2025-08-29 RX ORDER — DIAZEPAM 5 MG/1
5 TABLET ORAL ONCE AS NEEDED
Status: DISCONTINUED | OUTPATIENT
Start: 2025-08-29 | End: 2025-08-30 | Stop reason: HOSPADM

## 2025-08-29 RX ORDER — METHYLPREDNISOLONE ACETATE 40 MG/ML
INJECTION, SUSPENSION INTRA-ARTICULAR; INTRALESIONAL; INTRAMUSCULAR; SOFT TISSUE AS NEEDED
Status: COMPLETED | OUTPATIENT
Start: 2025-08-29 | End: 2025-08-29

## 2025-08-29 RX ADMIN — METHYLPREDNISOLONE ACETATE 40 MG: 40 INJECTION, SUSPENSION INTRA-ARTICULAR; INTRALESIONAL; INTRAMUSCULAR; SOFT TISSUE at 11:35

## 2025-08-29 RX ADMIN — LIDOCAINE HYDROCHLORIDE 4 ML: 10 INJECTION, SOLUTION EPIDURAL; INFILTRATION; INTRACAUDAL; PERINEURAL at 11:34

## 2025-08-29 RX ADMIN — LIDOCAINE HYDROCHLORIDE 3 ML: 5 INJECTION, SOLUTION INFILTRATION at 11:34

## 2025-08-29 RX ADMIN — IOHEXOL 3 ML: 240 INJECTION, SOLUTION INTRATHECAL; INTRAVASCULAR; INTRAVENOUS; ORAL at 11:34

## 2025-08-29 ASSESSMENT — PAIN DESCRIPTION - DESCRIPTORS: DESCRIPTORS: ACHING

## 2025-08-29 ASSESSMENT — PAIN - FUNCTIONAL ASSESSMENT
PAIN_FUNCTIONAL_ASSESSMENT: 0-10
PAIN_FUNCTIONAL_ASSESSMENT: 0-10

## 2025-08-29 ASSESSMENT — PAIN SCALES - GENERAL
PAINLEVEL_OUTOF10: 5 - MODERATE PAIN
PAINLEVEL_OUTOF10: 5 - MODERATE PAIN

## 2025-09-05 ENCOUNTER — APPOINTMENT (OUTPATIENT)
Dept: RADIOLOGY | Facility: HOSPITAL | Age: 40
End: 2025-09-05
Payer: MEDICAID

## 2025-09-11 ENCOUNTER — APPOINTMENT (OUTPATIENT)
Facility: CLINIC | Age: 40
End: 2025-09-11
Payer: MEDICAID

## 2025-10-02 ENCOUNTER — APPOINTMENT (OUTPATIENT)
Facility: CLINIC | Age: 40
End: 2025-10-02
Payer: MEDICAID

## (undated) DEVICE — SHEARS, CURVED HARMONIC ACE 36CM

## (undated) DEVICE — VALVE, SUCTION, DEFENDO, DISPOSABLE, STRL

## (undated) DEVICE — APPLICATION KIT, ADVANCED CEMENT MIXING/BIO-PREP CEMENT

## (undated) DEVICE — DRESSING, GAUZE, 16 PLY, 4 X 4 IN, STERILE

## (undated) DEVICE — BANDAGE, GAUZE, CONFORMING, KERLIX, 6 PLY, 4.5 IN X 4.1 YD

## (undated) DEVICE — APPLICATOR, CHLORAPREP, W/ORANGE TINT, 26ML

## (undated) DEVICE — Device

## (undated) DEVICE — REST, HEAD, BAGEL, 9 IN

## (undated) DEVICE — DEVICE, SUTURE, ENDOSTITCH, 10 MM

## (undated) DEVICE — ACCESS SYS, KII OPTICAL, Z-THREAD, 11X100CM

## (undated) DEVICE — IRRIGATION SET, Y, LARGE BORE

## (undated) DEVICE — MANIFOLD, 4 PORT NEPTUNE STANDARD

## (undated) DEVICE — DRAIN, PENROSE, 0.5 X 12 IN, LATEX, STERILE

## (undated) DEVICE — SCOPE WARMER, LAPAROSCOPE, BAG ONLY, LF

## (undated) DEVICE — ADHESIVE, SKIN, DERMABOND ADVANCED, 15CM, PEN-STYLE

## (undated) DEVICE — COVER, CART, 45 X 27 X 48 IN, CLEAR

## (undated) DEVICE — SUTURE, PDS, 0, 18 IN, LIGATING LOOP, VIOLET

## (undated) DEVICE — TUBING, SUCTION, CONNECTING, STERILE 0.25 X 120 IN., LF

## (undated) DEVICE — TROCAR, KII OPTICAL BLADELESS 5MM Z THREAD 100MM LNGTH

## (undated) DEVICE — SUTURE, VICRYL, 4-0, 18 IN, UNDYED BR PS-2

## (undated) DEVICE — DRAPE COVER, C ARM, FLOUROSCAN IMAGING SYS

## (undated) DEVICE — TOWEL, SURGICAL, NEURO, O/R, 16 X 26, BLUE, STERILE

## (undated) DEVICE — PUMP, STRYKERFLOW 2 & HANDPIECE W/10FT. IRRIGATION TUBING

## (undated) DEVICE — CATHETER, THORACIC, STRAIGHT, ADULT, 40FR, PVC

## (undated) DEVICE — BANDAGE, ELASTIC, MATRIX, SELF-CLOSURE, 4 IN X 5 YD, LF

## (undated) DEVICE — ELECTRODE, ELECTROSURGICAL, BLADE, STANDARD, 2.75 IN

## (undated) DEVICE — SLEEVE, INSERT ELAST NAIL SPI 8-13

## (undated) DEVICE — BANDAGE, ELASTIC, SELF-CLOSE, 6 IN, HONEYCOMB, STERILE

## (undated) DEVICE — COVER, C-ARM W/CLIPS, OEC GE

## (undated) DEVICE — GOWN, IMPERVIOUS, OPEN BACK, KNIT CUFFS, LARGE, BLUE

## (undated) DEVICE — PAD, GROUNDING, ELECTROSURGICAL, W/9 FT CABLE, POLYHESIVE II, ADULT, LF

## (undated) DEVICE — DRAPE, LEGGINGS, 48 X 31 IN, STERILE, LF

## (undated) DEVICE — SUTURE, PDS II, 0, 27 IN, CT1, VIOLET

## (undated) DEVICE — DRAPE, SHEET, U, W/ADHESIVE STRIP, IMPERVIOUS, 60 X 70 IN, DISPOSABLE, LF, STERILE

## (undated) DEVICE — MIXER, CEMENT, MIXEVAC III HIGH VACUUM KIT, STERILE

## (undated) DEVICE — DRESSING, NON-ADHERENT, OIL EMULSION, CURITY, 3 X 8 IN, STERILE

## (undated) DEVICE — TUBE SET, PNEUMOCLEAR, SMOKE EVACU, HIGH-FLOW

## (undated) DEVICE — WOUND SYSTEM, DEBRIDEMENT & CLEANING, O.R DUOPAK

## (undated) DEVICE — BANDAGE, COFLEX, 4 X 5 YDS, TAN, STERILE, LF